# Patient Record
Sex: FEMALE | Race: WHITE | Employment: OTHER | URBAN - METROPOLITAN AREA
[De-identification: names, ages, dates, MRNs, and addresses within clinical notes are randomized per-mention and may not be internally consistent; named-entity substitution may affect disease eponyms.]

---

## 2021-02-13 ENCOUNTER — APPOINTMENT (OUTPATIENT)
Dept: MRI IMAGING | Age: 86
DRG: 312 | End: 2021-02-13
Attending: INTERNAL MEDICINE
Payer: MEDICARE

## 2021-02-13 ENCOUNTER — APPOINTMENT (OUTPATIENT)
Dept: CT IMAGING | Age: 86
DRG: 312 | End: 2021-02-13
Attending: EMERGENCY MEDICINE
Payer: MEDICARE

## 2021-02-13 ENCOUNTER — HOSPITAL ENCOUNTER (INPATIENT)
Age: 86
LOS: 2 days | Discharge: HOME HEALTH | DRG: 312 | End: 2021-02-15
Attending: EMERGENCY MEDICINE | Admitting: INTERNAL MEDICINE
Payer: MEDICARE

## 2021-02-13 ENCOUNTER — APPOINTMENT (OUTPATIENT)
Dept: GENERAL RADIOLOGY | Age: 86
DRG: 312 | End: 2021-02-13
Attending: EMERGENCY MEDICINE
Payer: MEDICARE

## 2021-02-13 DIAGNOSIS — I48.91 ATRIAL FIBRILLATION, UNSPECIFIED TYPE (HCC): ICD-10-CM

## 2021-02-13 DIAGNOSIS — R47.01 EXPRESSIVE APHASIA: ICD-10-CM

## 2021-02-13 DIAGNOSIS — R55 SYNCOPE AND COLLAPSE: Primary | ICD-10-CM

## 2021-02-13 DIAGNOSIS — Z86.73 HISTORY OF EMBOLIC STROKE: ICD-10-CM

## 2021-02-13 LAB
ALBUMIN SERPL-MCNC: 3.5 G/DL (ref 3.5–5)
ALBUMIN/GLOB SERPL: 1 {RATIO} (ref 1.1–2.2)
ALP SERPL-CCNC: 93 U/L (ref 45–117)
ALT SERPL-CCNC: 17 U/L (ref 12–78)
ANION GAP SERPL CALC-SCNC: 6 MMOL/L (ref 5–15)
APPEARANCE UR: CLEAR
APTT PPP: 25.4 SEC (ref 22.1–31)
AST SERPL-CCNC: 18 U/L (ref 15–37)
ATRIAL RATE: 64 BPM
BASOPHILS # BLD: 0 K/UL (ref 0–0.1)
BASOPHILS NFR BLD: 0 % (ref 0–1)
BILIRUB SERPL-MCNC: 0.8 MG/DL (ref 0.2–1)
BILIRUB UR QL: NEGATIVE
BUN SERPL-MCNC: 23 MG/DL (ref 6–20)
BUN/CREAT SERPL: 14 (ref 12–20)
CALCIUM SERPL-MCNC: 9.5 MG/DL (ref 8.5–10.1)
CALCULATED R AXIS, ECG10: -59 DEGREES
CALCULATED T AXIS, ECG11: 105 DEGREES
CHLORIDE SERPL-SCNC: 107 MMOL/L (ref 97–108)
CHOLEST SERPL-MCNC: 178 MG/DL
CO2 SERPL-SCNC: 27 MMOL/L (ref 21–32)
COLOR UR: NORMAL
COMMENT, HOLDF: NORMAL
CREAT SERPL-MCNC: 1.69 MG/DL (ref 0.55–1.02)
DIAGNOSIS, 93000: NORMAL
DIFFERENTIAL METHOD BLD: ABNORMAL
EOSINOPHIL # BLD: 0 K/UL (ref 0–0.4)
EOSINOPHIL NFR BLD: 1 % (ref 0–7)
ERYTHROCYTE [DISTWIDTH] IN BLOOD BY AUTOMATED COUNT: 15.4 % (ref 11.5–14.5)
EST. AVERAGE GLUCOSE BLD GHB EST-MCNC: 108 MG/DL
GLOBULIN SER CALC-MCNC: 3.5 G/DL (ref 2–4)
GLUCOSE SERPL-MCNC: 180 MG/DL (ref 65–100)
GLUCOSE UR STRIP.AUTO-MCNC: NEGATIVE MG/DL
HBA1C MFR BLD: 5.4 % (ref 4–5.6)
HCT VFR BLD AUTO: 41 % (ref 35–47)
HDLC SERPL-MCNC: 48 MG/DL
HDLC SERPL: 3.7 {RATIO} (ref 0–5)
HGB BLD-MCNC: 13.4 G/DL (ref 11.5–16)
HGB UR QL STRIP: NEGATIVE
IMM GRANULOCYTES # BLD AUTO: 0 K/UL (ref 0–0.04)
IMM GRANULOCYTES NFR BLD AUTO: 0 % (ref 0–0.5)
INR PPP: 1.1 (ref 0.9–1.1)
KETONES UR QL STRIP.AUTO: NEGATIVE MG/DL
LACTATE SERPL-SCNC: 1.9 MMOL/L (ref 0.4–2)
LACTATE SERPL-SCNC: 5.1 MMOL/L (ref 0.4–2)
LDLC SERPL CALC-MCNC: 91 MG/DL (ref 0–100)
LEUKOCYTE ESTERASE UR QL STRIP.AUTO: NEGATIVE
LIPID PROFILE,FLP: ABNORMAL
LYMPHOCYTES # BLD: 1.4 K/UL (ref 0.8–3.5)
LYMPHOCYTES NFR BLD: 22 % (ref 12–49)
MCH RBC QN AUTO: 31.2 PG (ref 26–34)
MCHC RBC AUTO-ENTMCNC: 32.7 G/DL (ref 30–36.5)
MCV RBC AUTO: 95.3 FL (ref 80–99)
MONOCYTES # BLD: 0.4 K/UL (ref 0–1)
MONOCYTES NFR BLD: 6 % (ref 5–13)
NEUTS SEG # BLD: 4.5 K/UL (ref 1.8–8)
NEUTS SEG NFR BLD: 71 % (ref 32–75)
NITRITE UR QL STRIP.AUTO: NEGATIVE
NRBC # BLD: 0 K/UL (ref 0–0.01)
NRBC BLD-RTO: 0 PER 100 WBC
PH UR STRIP: 6.5 [PH] (ref 5–8)
PLATELET # BLD AUTO: 192 K/UL (ref 150–400)
PMV BLD AUTO: 10.5 FL (ref 8.9–12.9)
POTASSIUM SERPL-SCNC: 3.7 MMOL/L (ref 3.5–5.1)
PROT SERPL-MCNC: 7 G/DL (ref 6.4–8.2)
PROT UR STRIP-MCNC: NEGATIVE MG/DL
PROTHROMBIN TIME: 11.3 SEC (ref 9–11.1)
Q-T INTERVAL, ECG07: 364 MS
QRS DURATION, ECG06: 86 MS
QTC CALCULATION (BEZET), ECG08: 438 MS
RBC # BLD AUTO: 4.3 M/UL (ref 3.8–5.2)
SAMPLES BEING HELD,HOLD: NORMAL
SODIUM SERPL-SCNC: 140 MMOL/L (ref 136–145)
SP GR UR REFRACTOMETRY: 1.01 (ref 1–1.03)
THERAPEUTIC RANGE,PTTT: NORMAL SECS (ref 58–77)
TRIGL SERPL-MCNC: 195 MG/DL (ref ?–150)
TROPONIN I SERPL-MCNC: <0.05 NG/ML
TSH SERPL DL<=0.05 MIU/L-ACNC: 4.51 UIU/ML (ref 0.36–3.74)
UR CULT HOLD, URHOLD: NORMAL
UROBILINOGEN UR QL STRIP.AUTO: 0.2 EU/DL (ref 0.2–1)
VENTRICULAR RATE, ECG03: 87 BPM
VLDLC SERPL CALC-MCNC: 39 MG/DL
WBC # BLD AUTO: 6.4 K/UL (ref 3.6–11)

## 2021-02-13 PROCEDURE — 84443 ASSAY THYROID STIM HORMONE: CPT

## 2021-02-13 PROCEDURE — 80053 COMPREHEN METABOLIC PANEL: CPT

## 2021-02-13 PROCEDURE — 80061 LIPID PANEL: CPT

## 2021-02-13 PROCEDURE — 77030038269 HC DRN EXT URIN PURWCK BARD -A

## 2021-02-13 PROCEDURE — 77030019905 HC CATH URETH INTMIT MDII -A

## 2021-02-13 PROCEDURE — 83605 ASSAY OF LACTIC ACID: CPT

## 2021-02-13 PROCEDURE — 84484 ASSAY OF TROPONIN QUANT: CPT

## 2021-02-13 PROCEDURE — 93005 ELECTROCARDIOGRAM TRACING: CPT

## 2021-02-13 PROCEDURE — 74011250636 HC RX REV CODE- 250/636: Performed by: INTERNAL MEDICINE

## 2021-02-13 PROCEDURE — 85730 THROMBOPLASTIN TIME PARTIAL: CPT

## 2021-02-13 PROCEDURE — 70450 CT HEAD/BRAIN W/O DYE: CPT

## 2021-02-13 PROCEDURE — 83036 HEMOGLOBIN GLYCOSYLATED A1C: CPT

## 2021-02-13 PROCEDURE — 99285 EMERGENCY DEPT VISIT HI MDM: CPT

## 2021-02-13 PROCEDURE — 81003 URINALYSIS AUTO W/O SCOPE: CPT

## 2021-02-13 PROCEDURE — 85025 COMPLETE CBC W/AUTO DIFF WBC: CPT

## 2021-02-13 PROCEDURE — 74011250637 HC RX REV CODE- 250/637: Performed by: INTERNAL MEDICINE

## 2021-02-13 PROCEDURE — 87040 BLOOD CULTURE FOR BACTERIA: CPT

## 2021-02-13 PROCEDURE — 71045 X-RAY EXAM CHEST 1 VIEW: CPT

## 2021-02-13 PROCEDURE — 65660000000 HC RM CCU STEPDOWN

## 2021-02-13 PROCEDURE — 85610 PROTHROMBIN TIME: CPT

## 2021-02-13 PROCEDURE — 36415 COLL VENOUS BLD VENIPUNCTURE: CPT

## 2021-02-13 PROCEDURE — 74011250636 HC RX REV CODE- 250/636: Performed by: EMERGENCY MEDICINE

## 2021-02-13 RX ORDER — MIRTAZAPINE 15 MG/1
7.5 TABLET, FILM COATED ORAL
Status: DISCONTINUED | OUTPATIENT
Start: 2021-02-13 | End: 2021-02-15 | Stop reason: HOSPADM

## 2021-02-13 RX ORDER — SODIUM CHLORIDE 9 MG/ML
75 INJECTION, SOLUTION INTRAVENOUS CONTINUOUS
Status: DISCONTINUED | OUTPATIENT
Start: 2021-02-13 | End: 2021-02-15 | Stop reason: HOSPADM

## 2021-02-13 RX ORDER — METOPROLOL SUCCINATE 50 MG/1
50 TABLET, EXTENDED RELEASE ORAL DAILY
Status: DISCONTINUED | OUTPATIENT
Start: 2021-02-14 | End: 2021-02-15 | Stop reason: HOSPADM

## 2021-02-13 RX ORDER — ASPIRIN 81 MG/1
81 TABLET ORAL DAILY
Status: DISCONTINUED | OUTPATIENT
Start: 2021-02-14 | End: 2021-02-15 | Stop reason: HOSPADM

## 2021-02-13 RX ORDER — GABAPENTIN 100 MG/1
100 CAPSULE ORAL 2 TIMES DAILY
Status: DISCONTINUED | OUTPATIENT
Start: 2021-02-13 | End: 2021-02-15 | Stop reason: HOSPADM

## 2021-02-13 RX ORDER — ATORVASTATIN CALCIUM 20 MG/1
20 TABLET, FILM COATED ORAL
Status: DISCONTINUED | OUTPATIENT
Start: 2021-02-13 | End: 2021-02-15 | Stop reason: HOSPADM

## 2021-02-13 RX ORDER — ENOXAPARIN SODIUM 100 MG/ML
30 INJECTION SUBCUTANEOUS EVERY 24 HOURS
Status: DISCONTINUED | OUTPATIENT
Start: 2021-02-13 | End: 2021-02-13

## 2021-02-13 RX ORDER — ACETAMINOPHEN 325 MG/1
650 TABLET ORAL
Status: DISCONTINUED | OUTPATIENT
Start: 2021-02-13 | End: 2021-02-15 | Stop reason: HOSPADM

## 2021-02-13 RX ORDER — ASPIRIN 300 MG/1
300 SUPPOSITORY RECTAL DAILY
Status: DISCONTINUED | OUTPATIENT
Start: 2021-02-14 | End: 2021-02-13

## 2021-02-13 RX ORDER — ACETAMINOPHEN 650 MG/1
650 SUPPOSITORY RECTAL
Status: DISCONTINUED | OUTPATIENT
Start: 2021-02-13 | End: 2021-02-15 | Stop reason: HOSPADM

## 2021-02-13 RX ORDER — LORAZEPAM 2 MG/ML
0.5 INJECTION INTRAMUSCULAR
Status: COMPLETED | OUTPATIENT
Start: 2021-02-13 | End: 2021-02-14

## 2021-02-13 RX ORDER — FUROSEMIDE 20 MG/1
20 TABLET ORAL DAILY
Status: DISCONTINUED | OUTPATIENT
Start: 2021-02-14 | End: 2021-02-15 | Stop reason: HOSPADM

## 2021-02-13 RX ORDER — LANOLIN ALCOHOL/MO/W.PET/CERES
3 CREAM (GRAM) TOPICAL
Status: DISCONTINUED | OUTPATIENT
Start: 2021-02-13 | End: 2021-02-15 | Stop reason: HOSPADM

## 2021-02-13 RX ORDER — LORAZEPAM 2 MG/ML
0.5 INJECTION INTRAMUSCULAR ONCE
Status: DISCONTINUED | OUTPATIENT
Start: 2021-02-13 | End: 2021-02-13

## 2021-02-13 RX ADMIN — ENOXAPARIN SODIUM 30 MG: 30 INJECTION SUBCUTANEOUS at 15:01

## 2021-02-13 RX ADMIN — MIRTAZAPINE 7.5 MG: 15 TABLET, FILM COATED ORAL at 23:02

## 2021-02-13 RX ADMIN — GABAPENTIN 100 MG: 100 CAPSULE ORAL at 18:58

## 2021-02-13 RX ADMIN — SODIUM CHLORIDE 1000 ML: 9 INJECTION, SOLUTION INTRAVENOUS at 16:17

## 2021-02-13 RX ADMIN — SODIUM CHLORIDE 500 ML: 9 INJECTION, SOLUTION INTRAVENOUS at 15:01

## 2021-02-13 RX ADMIN — SODIUM CHLORIDE 75 ML/HR: 9 INJECTION, SOLUTION INTRAVENOUS at 16:34

## 2021-02-13 RX ADMIN — APIXABAN 2.5 MG: 2.5 TABLET, FILM COATED ORAL at 18:58

## 2021-02-13 RX ADMIN — ATORVASTATIN CALCIUM 20 MG: 20 TABLET, FILM COATED ORAL at 23:02

## 2021-02-13 NOTE — ED NOTES
Bedside and Verbal shift change report given to Rosario Lazcano RN (oncoming nurse) by Alayna Berumne RN and Tawny Carter RN (offgoing nurse). Report included the following information SBAR, ED Summary, MAR and Recent Results.

## 2021-02-13 NOTE — ED NOTES
Patient presents from home with complaints of syncopal episode that occurred while sitting at this kitchen table. Per daughter patient is at her baseline mentation status.  Patient alert and oriented to self only, with delayed responses

## 2021-02-13 NOTE — CONSULTS
Cardiology Note dictated # [id number]  Imp:  Syncope without fall:     Likely causes include VT/VF, new CVA,  orthostatic hypotension  Chronic AF: CHADS VASC 7  Has H/O embolic CVA with residual expressive aphasia  HTN   Dementia  CKD  CAD: angioplasty in 1989 and PCI with stenting 2014  Ischemic CM most recent EF 25% (2017)  Recommendations:  Orthostatic blood pressure checks  Carotid dopplers  Agree with IV hydration  Further recommendations to follow  Thank you for this referral.  Viry Gomez MD  Interventional Cardiology  [facility]

## 2021-02-13 NOTE — ED PROVIDER NOTES
This is a 80-year-old female who lives at home with her family. She just arrived in [place] from [place] in October of last year. She has seen Dr. Gerhardt Lamy with cardiology once recently for her chronic atrial fibrillation. She is on Eliquis for that. According to the daughter, she had one episode of syncope about a year ago related to her atrial fibrillation while she was sitting on the toilet. The daughter states that the patient got up normally this morning, dressed herself, put her ear aids in place and got herself dressed. She has had no fever or chills, nausea or vomiting and no GI or  symptoms. The patient is complained of no chest pain, nausea or vomiting and no abdominal pain. She has had no headache. The daughter stated that she was her normal self, again, noting that she has a difficult time expressing herself and often cannot get her words out. She sat down to eat at the breakfast table and apparently blacked out. She was out for approximately 5 minutes. When she came around, the daughter stated that she was acting more like herself. The family had no other complaints. They understand she may be admitted and are okay with that. History reviewed. No pertinent past medical history. History reviewed. No pertinent surgical history. History reviewed. No pertinent family history.     Social History     Socioeconomic History    Marital status: Not on file     Spouse name: Not on file    Number of children: Not on file    Years of education: Not on file    Highest education level: Not on file   Occupational History    Not on file   Social Needs    Financial resource strain: Not on file    Food insecurity     Worry: Not on file     Inability: Not on file    Transportation needs     Medical: Not on file     Non-medical: Not on file   Tobacco Use    Smoking status: Not on file   Substance and Sexual Activity    Alcohol use: Not on file    Drug use: Not on file    Sexual activity: Not on file   Lifestyle    Physical activity     Days per week: Not on file     Minutes per session: Not on file    Stress: Not on file   Relationships    Social connections     Talks on phone: Not on file     Gets together: Not on file     Attends Denominational service: Not on file     Active member of club or organization: Not on file     Attends meetings of clubs or organizations: Not on file     Relationship status: Not on file    Intimate partner violence     Fear of current or ex partner: Not on file     Emotionally abused: Not on file     Physically abused: Not on file     Forced sexual activity: Not on file   Other Topics Concern    Not on file   Social History Narrative    Not on file         ALLERGIES: Patient has no known allergies. Review of Systems   Reason unable to perform ROS: The patient is unable to provide any history. The review of systems is provided by the daughter who lives with the patient. Constitutional: Negative for activity change, appetite change and fatigue. HENT: Negative for ear pain, facial swelling, sore throat and trouble swallowing. Eyes: Negative for pain, discharge and visual disturbance. Respiratory: Negative for chest tightness, shortness of breath and wheezing. Cardiovascular: Negative for chest pain and palpitations. Atrial fibrillation on Eliquis   Gastrointestinal: Negative for abdominal pain, blood in stool, nausea and vomiting. Genitourinary: Negative for difficulty urinating, flank pain and hematuria. Musculoskeletal: Negative for arthralgias, joint swelling, myalgias and neck pain. Skin: Negative for color change and rash. Neurological: Positive for syncope and speech difficulty ( Chronic). Negative for dizziness, weakness, numbness and headaches. Hematological: Negative for adenopathy. Does not bruise/bleed easily. Psychiatric/Behavioral: Negative for behavioral problems, confusion and sleep disturbance.    All other systems reviewed and are negative. Vitals:    02/13/21 1304   BP: (!) 102/91   Pulse: 84   Resp: 14   Temp: 97.3 °F (36.3 °C)   SpO2: 98%            Physical Exam  Vitals signs and nursing note reviewed. Constitutional:       General: She is not in acute distress. Appearance: She is well-developed. She is not ill-appearing, toxic-appearing or diaphoretic. Comments: The patient is alert and will respond to commands appropriately, however, she has a difficult time in speaking and getting her thoughts out. According to the family this is not unusual for her. All signs are as noted. HENT:      Head: Normocephalic and atraumatic. Nose: Nose normal.   Eyes:      General: No scleral icterus. Conjunctiva/sclera: Conjunctivae normal.      Pupils: Pupils are equal, round, and reactive to light. Neck:      Musculoskeletal: Normal range of motion and neck supple. Thyroid: No thyromegaly. Vascular: No JVD. Trachea: No tracheal deviation. Comments: No carotid bruits noted. Cardiovascular:      Rate and Rhythm: Normal rate and regular rhythm. Heart sounds: Normal heart sounds. No murmur. No friction rub. No gallop. Pulmonary:      Effort: Pulmonary effort is normal. No respiratory distress. Breath sounds: Normal breath sounds. No wheezing or rales. Chest:      Chest wall: No tenderness. Abdominal:      General: Bowel sounds are normal. There is no distension. Palpations: Abdomen is soft. There is no mass. Tenderness: There is no abdominal tenderness. There is no guarding or rebound. Musculoskeletal: Normal range of motion. General: No tenderness. Lymphadenopathy:      Cervical: No cervical adenopathy. Skin:     General: Skin is warm and dry. Findings: No erythema or rash. Neurological:      General: No focal deficit present. Mental Status: She is alert. Mental status is at baseline.       Cranial Nerves: No cranial nerve deficit. Sensory: No sensory deficit. Motor: No weakness. Coordination: Coordination normal.      Deep Tendon Reflexes: Reflexes are normal and symmetric. Comments: The patient has an expressive aphasia. She does follow commands appropriately and has no focal deficit otherwise noted. Psychiatric:      Comments: Unable to evaluate due to patient's inability to express herself. MDM  Number of Diagnoses or Management Options     Amount and/or Complexity of Data Reviewed  Clinical lab tests: ordered and reviewed  Tests in the radiology section of CPT®: ordered and reviewed  Decide to obtain previous medical records or to obtain history from someone other than the patient: yes  Obtain history from someone other than the patient: yes  Review and summarize past medical records: yes  Discuss the patient with other providers: yes  Independent visualization of images, tracings, or specimens: yes    Risk of Complications, Morbidity, and/or Mortality  Presenting problems: high  Diagnostic procedures: high  Management options: high    Patient Progress  Patient progress: stable         Procedures    ED MD EKG interpretation: The patient's rhythm is atrial fibrillation with a controlled rate at 87 beats a minute. There is a left axis shift with an occasional PVC noted. Nonspecific ST and T wave changes are noted. No other acute changes are appreciated. We will check the patient's labs, x-ray and urinalysis. Will CT her head. Patient will require admission overnight at least for syncope. Will consult cardiology as well. The patient's labs appear unremarkable. Troponin is still pending. We'll consult VCS as Dr. Manda Cat is seen the patient for A. fib. It sounds as though this is a chronic atrial fibrillation. We have no other records on this patient so it is impossible to tell. We'll ask [facility] to weigh in.  The patient is currently on Eliquis and will require admission for syncope. The daughter did say that the patient just wasn't quite acting herself earlier in the day. There was no other significant history associated with this episode of passing out. We'll also consult the hospitalist for admission. Perfect Serve Consult for Admission  2:25 PM    ED Room Number: ZT53/92  Patient Name and age:  Cynthia Ontiveros 80 y.o.  female  Working Diagnosis:   1. Syncope and collapse    2. Atrial fibrillation, unspecified type ([address]    3. Expressive aphasia        COVID-19 Suspicion:  no  Sepsis present:  no  Reassessment needed: no  Code Status:  Full Code  Readmission: no  Isolation Requirements:  no  Recommended Level of Care:  telemetry  Department:[facility] Adult ED - 21   Other:  VCS has been consulted    2:47 PM I have discussed the patient with Dr. Narendra Soliz and related I believe this is atrial fibrillation or chronic problem for the patient. She is slightly orthostatic when she goes from a laying to sitting and drops about 30 points on systolic pressure. We will give a bolus of fluid. The hospitalist is to see. Patient's troponin is negative.

## 2021-02-13 NOTE — ED NOTES
Patient's daughter Niecy updated at this time. Niecy had questions about MRI and wanted to talk with Dr. Apolonia Serrano.  RN paging Dr. Apolonia Serrano at this time and holding off on completing MRI screening form

## 2021-02-13 NOTE — H&P
HISTORY AND PHYSICAL      PCP: Esau Shoemaker MD  History source: Medical records       CC: Syncope       HPI: A 80year old female patient with PMH of Afib on Eliquis came to ED for evaluation of syncope. Pt is alert but confused  - she is a poor historian. No family at bedside. Most of hx obtained from ED records \" She just arrived in [place] from [place] in October of last year. She has seen Dr. Pamela Corrales with cardiology once recently for her chronic atrial fibrillation. According to the daughter, she had one episode of syncope about a year ago related to her atrial fibrillation while she was sitting on the toilet. The daughter states that the patient got up normally this morning, dressed herself, put her ear aids in place and got herself dressed. She has had no fever or chills, nausea or vomiting and no GI or  symptoms. The patient is complained of no chest pain, nausea or vomiting and no abdominal pain. The daughter stated that she was her normal self, again, noting that she has a difficult time expressing herself and often cannot get her words out. She sat down to eat at the breakfast table and apparently blacked out. She was out for approximately 5 minutes. When she came around, the daughter stated that she was acting more like herself. \"     Spoke with daughter Niecy - pt had similar episodes of syncope in the past. She attributes it to Afib. Hx labile BP. This episode happened while seated. He speech is slow which is her baseline but she has no issues with memeory  Explained to do MRI brain and she agreed. Cardiology and neurology consults. She confirmed that patient has living will and she is DNR and her sister Winston White is [place]. PMH/PSH:  Afib.  HLD, CVA, peripheral neuropathy     Home meds:   Prior to Admission medications    Not on File       Allergies:  No Known Allergies     FH:  History reviewed. No pertinent family history.  unable to obtain     SH:  Social History Tobacco Use    Smoking status: Not on file   Substance Use Topics    Alcohol use: Not on file   unable to obtain     ROS: Review of systems not obtained due to patient factors. PHYSICAL EXAM:  Visit Vitals  BP (!) 156/74 (BP 1 Location: Left arm, BP Patient Position: Supine)   Pulse 99   Temp 97.3 °F (36.3 °C)   Resp 20   SpO2 98%       Gen: NAD, elderly   HEENT: anicteric sclerae, normal conjunctiva, oropharynx clear, MM moist  Neck: supple, trachea midline, no adenopathy  Heart: RRR, no MRG, no JVD, no peripheral edema  Lungs: CTA b/l, non-labored respirations  Abd: soft, NT, ND, BS+, no organomegaly  Extr: warm  Skin: dry, no rash  Neuro: slow speech      Labs/Imaging:  Recent Results (from the past 24 hour(s))   EKG, 12 LEAD, INITIAL    Collection Time: 02/13/21  1:11 PM   Result Value Ref Range    Ventricular Rate 87 BPM    Atrial Rate 64 BPM    QRS Duration 86 ms    Q-T Interval 364 ms    QTC Calculation (Bezet) 438 ms    Calculated R Axis -59 degrees    Calculated T Axis 105 degrees    Diagnosis       Atrial fibrillation with premature ventricular or aberrantly conducted   complexes  Left anterior fascicular block  Anteroseptal infarct , age undetermined  No previous ECGs available  Confirmed by Cong Colvin MD, Tommy ([id number]) on 2/13/2021 2:16:57 PM     CBC WITH AUTOMATED DIFF    Collection Time: 02/13/21  1:22 PM   Result Value Ref Range    WBC 6.4 3.6 - 11.0 K/uL    RBC 4.30 3.80 - 5.20 M/uL    HGB 13.4 11.5 - 16.0 g/dL    HCT 41.0 35.0 - 47.0 %    MCV 95.3 80.0 - 99.0 FL    MCH 31.2 26.0 - 34.0 PG    MCHC 32.7 30.0 - 36.5 g/dL    RDW 15.4 (H) 11.5 - 14.5 %    PLATELET 581 581 - 692 K/uL    MPV 10.5 8.9 - 12.9 FL    NRBC 0.0 0  WBC    ABSOLUTE NRBC 0.00 0.00 - 0.01 K/uL    NEUTROPHILS 71 32 - 75 %    LYMPHOCYTES 22 12 - 49 %    MONOCYTES 6 5 - 13 %    EOSINOPHILS 1 0 - 7 %    BASOPHILS 0 0 - 1 %    IMMATURE GRANULOCYTES 0 0.0 - 0.5 %    ABS. NEUTROPHILS 4.5 1.8 - 8.0 K/UL    ABS.  LYMPHOCYTES 1.4 0.8 - 3.5 K/UL    ABS. MONOCYTES 0.4 0.0 - 1.0 K/UL    ABS. EOSINOPHILS 0.0 0.0 - 0.4 K/UL    ABS. BASOPHILS 0.0 0.0 - 0.1 K/UL    ABS. IMM. GRANS. 0.0 0.00 - 0.04 K/UL    DF AUTOMATED     METABOLIC PANEL, COMPREHENSIVE    Collection Time: 02/13/21  1:22 PM   Result Value Ref Range    Sodium 140 136 - 145 mmol/L    Potassium 3.7 3.5 - 5.1 mmol/L    Chloride 107 97 - 108 mmol/L    CO2 27 21 - 32 mmol/L    Anion gap 6 5 - 15 mmol/L    Glucose 180 (H) 65 - 100 mg/dL    BUN 23 (H) 6 - 20 MG/DL    Creatinine 1.69 (H) 0.55 - 1.02 MG/DL    BUN/Creatinine ratio 14 12 - 20      GFR est AA 34 (L) >60 ml/min/1.73m2    GFR est non-AA 28 (L) >60 ml/min/1.73m2    Calcium 9.5 8.5 - 10.1 MG/DL    Bilirubin, total 0.8 0.2 - 1.0 MG/DL    ALT (SGPT) 17 12 - 78 U/L    AST (SGOT) 18 15 - 37 U/L    Alk. phosphatase 93 45 - 117 U/L    Protein, total 7.0 6.4 - 8.2 g/dL    Albumin 3.5 3.5 - 5.0 g/dL    Globulin 3.5 2.0 - 4.0 g/dL    A-G Ratio 1.0 (L) 1.1 - 2.2     SAMPLES BEING HELD    Collection Time: 02/13/21  1:22 PM   Result Value Ref Range    SAMPLES BEING HELD 1BLU,1RED     COMMENT        Add-on orders for these samples will be processed based on acceptable specimen integrity and analyte stability, which may vary by analyte.    PTT    Collection Time: 02/13/21  1:22 PM   Result Value Ref Range    aPTT 25.4 22.1 - 31.0 sec    aPTT, therapeutic range     58.0 - 77.0 SECS   PROTHROMBIN TIME + INR    Collection Time: 02/13/21  1:22 PM   Result Value Ref Range    INR 1.1 0.9 - 1.1      Prothrombin time 11.3 (H) 9.0 - 11.1 sec   TROPONIN I    Collection Time: 02/13/21  1:22 PM   Result Value Ref Range    Troponin-I, Qt. <0.05 <0.05 ng/mL   LIPID PANEL    Collection Time: 02/13/21  1:22 PM   Result Value Ref Range    LIPID PROFILE          Cholesterol, total 178 <200 MG/DL    Triglyceride 195 (H) <150 MG/DL    HDL Cholesterol 48 MG/DL    LDL, calculated 91 0 - 100 MG/DL    VLDL, calculated 39 MG/DL    CHOL/HDL Ratio 3.7 0.0 - 5.0 HEMOGLOBIN A1C WITH EAG    Collection Time: 02/13/21  1:22 PM   Result Value Ref Range    Hemoglobin A1c 5.4 4.0 - 5.6 %    Est. average glucose 108 mg/dL   TSH 3RD GENERATION    Collection Time: 02/13/21  1:22 PM   Result Value Ref Range    TSH 4.51 (H) 0.36 - 3.74 uIU/mL   URINALYSIS W/ RFLX MICROSCOPIC    Collection Time: 02/13/21  2:04 PM   Result Value Ref Range    Color YELLOW/STRAW      Appearance CLEAR CLEAR      Specific gravity 1.009 1.003 - 1.030      pH (UA) 6.5 5.0 - 8.0      Protein Negative NEG mg/dL    Glucose Negative NEG mg/dL    Ketone Negative NEG mg/dL    Bilirubin Negative NEG      Blood Negative NEG      Urobilinogen 0.2 0.2 - 1.0 EU/dL    Nitrites Negative NEG      Leukocyte Esterase Negative NEG     URINE CULTURE HOLD SAMPLE    Collection Time: 02/13/21  2:04 PM    Specimen: Serum   Result Value Ref Range    Urine culture hold        Urine on hold in Microbiology dept for 2 days. If unpreserved urine is submitted, it cannot be used for addtional testing after 24 hours, recollection will be required. LACTIC ACID    Collection Time: 02/13/21  2:11 PM   Result Value Ref Range    Lactic acid 5.1 (HH) 0.4 - 2.0 MMOL/L       Recent Labs     02/13/21  1322   WBC 6.4   HGB 13.4   HCT 41.0        Recent Labs     02/13/21  1322      K 3.7      CO2 27   BUN 23*   CREA 1.69*   *   CA 9.5     Recent Labs     02/13/21  1322   ALT 17   AP 93   TBILI 0.8   TP 7.0   ALB 3.5   GLOB 3.5       Recent Labs     02/13/21  1322   TROIQ <0.05       Recent Labs     02/13/21  1322   INR 1.1   PTP 11.3*   APTT 25.4        No results for input(s): PH, PCO2, PO2 in the last 72 hours. All labs and imaging personally reviewed by me. Assessment & Plan:   Syncope - unclear etiology   R/o CVA. Could be cardiac etiology   - admit to neuro floor. Tele monitoring   - CT head: Significant small vessel ischemic changes. Old right occipital infarct. No acute abnormality.   - neuro checks  - lipid panel LDL 91 and A1c 5.4  - MRI brain ordered  - neurology consult placed  - SLP/PT/OT  - orthostatics ordered     Elevated cr. Unknown baseline  - cr 1.69  - started on IVF  - will monitor renal function    Lactic acidosis 5.1  - unclear etiology  - no signs of infection. Could be due to dehydration  - UA negative. CXR clear  - blood cx sent in ED  - IVF NS bolus and c/w IVF  - will monitor    Chronic Afib   - rate controlled on metoprolol. C/w Eliquis   - echo ordered   - cardiology consulted in ED    Peripheral neuropathy - gabapentin  HLD- statin  Hx CVA    Independent mobility but slow   DVT ppx: Eliquis  Code status: DNR  Disposition: TBD.  Home likely tomorrow    Signed By: Caterina Broussard MD     February 13, 2021

## 2021-02-13 NOTE — ROUTINE PROCESS
TRANSFER - OUT REPORT: 
 
Verbal report given to Ness Winslow RN on Deleta Dandy  being transferred to  for routine progression of care Report consisted of patients Situation, Background, Assessment and  
Recommendations(SBAR). Information from the following report(s) SBAR, ED Summary, MAR, Recent Results and Cardiac Rhythm Afib was reviewed with the receiving nurse. Lines:  
Peripheral IV 02/13/21 Right Antecubital (Active) Site Assessment Clean, dry, & intact 02/13/21 1326 Phlebitis Assessment 0 02/13/21 1326 Infiltration Assessment 0 02/13/21 1326 Dressing Status Clean, dry, & intact 02/13/21 1326 Dressing Type Transparent 02/13/21 1326 Hub Color/Line Status Pink 02/13/21 1326 Action Taken Blood drawn 02/13/21 1326 Peripheral IV 02/13/21 Left Forearm (Active) Site Assessment Clean, dry, & intact 02/13/21 1416 Phlebitis Assessment 0 02/13/21 1416 Infiltration Assessment 0 02/13/21 1416 Opportunity for questions and clarification was provided. Patient transported with: 
 Transport

## 2021-02-14 ENCOUNTER — APPOINTMENT (OUTPATIENT)
Dept: MRI IMAGING | Age: 86
DRG: 312 | End: 2021-02-14
Attending: INTERNAL MEDICINE
Payer: MEDICARE

## 2021-02-14 ENCOUNTER — APPOINTMENT (OUTPATIENT)
Dept: VASCULAR SURGERY | Age: 86
DRG: 312 | End: 2021-02-14
Attending: INTERNAL MEDICINE
Payer: MEDICARE

## 2021-02-14 LAB
ANION GAP SERPL CALC-SCNC: 5 MMOL/L (ref 5–15)
BUN SERPL-MCNC: 19 MG/DL (ref 6–20)
BUN/CREAT SERPL: 13 (ref 12–20)
CALCIUM SERPL-MCNC: 8.1 MG/DL (ref 8.5–10.1)
CHLORIDE SERPL-SCNC: 112 MMOL/L (ref 97–108)
CO2 SERPL-SCNC: 19 MMOL/L (ref 21–32)
CREAT SERPL-MCNC: 1.41 MG/DL (ref 0.55–1.02)
ERYTHROCYTE [DISTWIDTH] IN BLOOD BY AUTOMATED COUNT: 15.3 % (ref 11.5–14.5)
GLUCOSE SERPL-MCNC: 88 MG/DL (ref 65–100)
HCT VFR BLD AUTO: 36.1 % (ref 35–47)
HGB BLD-MCNC: 11.6 G/DL (ref 11.5–16)
LEFT CCA DIST DIAS: 10.1 CM/S
LEFT CCA DIST SYS: 56.4 CM/S
LEFT CCA PROX DIAS: 8.4 CM/S
LEFT CCA PROX SYS: 45 CM/S
LEFT ECA DIAS: 0 CM/S
LEFT ECA SYS: 98.7 CM/S
LEFT ICA DIST DIAS: 15 CM/S
LEFT ICA DIST SYS: 55.6 CM/S
LEFT ICA MID DIAS: 14 CM/S
LEFT ICA MID SYS: 69.6 CM/S
LEFT ICA PROX DIAS: 22 CM/S
LEFT ICA PROX SYS: 81.5 CM/S
LEFT ICA/CCA SYS: 1.45
LEFT VERTEBRAL DIAS: 8.55 CM/S
LEFT VERTEBRAL SYS: 30.5 CM/S
MCH RBC QN AUTO: 30.8 PG (ref 26–34)
MCHC RBC AUTO-ENTMCNC: 32.1 G/DL (ref 30–36.5)
MCV RBC AUTO: 95.8 FL (ref 80–99)
NRBC # BLD: 0 K/UL (ref 0–0.01)
NRBC BLD-RTO: 0 PER 100 WBC
PLATELET # BLD AUTO: 187 K/UL (ref 150–400)
PMV BLD AUTO: 10.8 FL (ref 8.9–12.9)
POTASSIUM SERPL-SCNC: 3.6 MMOL/L (ref 3.5–5.1)
RBC # BLD AUTO: 3.77 M/UL (ref 3.8–5.2)
RIGHT CCA DIST DIAS: 14.2 CM/S
RIGHT CCA DIST SYS: 54.4 CM/S
RIGHT CCA PROX DIAS: 16.4 CM/S
RIGHT CCA PROX SYS: 73.2 CM/S
RIGHT ECA DIAS: 0 CM/S
RIGHT ECA SYS: 95.6 CM/S
RIGHT ICA DIST DIAS: 17.7 CM/S
RIGHT ICA DIST SYS: 49.1 CM/S
RIGHT ICA MID DIAS: 9 CM/S
RIGHT ICA MID SYS: 47.4 CM/S
RIGHT ICA PROX DIAS: 14.6 CM/S
RIGHT ICA PROX SYS: 67.4 CM/S
RIGHT ICA/CCA SYS: 1.2
RIGHT VERTEBRAL DIAS: 0 CM/S
RIGHT VERTEBRAL SYS: 28.9 CM/S
SODIUM SERPL-SCNC: 136 MMOL/L (ref 136–145)
WBC # BLD AUTO: 6.4 K/UL (ref 3.6–11)
WBC #/AREA STL HPF: NORMAL /HPF (ref 0–4)

## 2021-02-14 PROCEDURE — 77030038269 HC DRN EXT URIN PURWCK BARD -A

## 2021-02-14 PROCEDURE — 70551 MRI BRAIN STEM W/O DYE: CPT

## 2021-02-14 PROCEDURE — 80048 BASIC METABOLIC PNL TOTAL CA: CPT

## 2021-02-14 PROCEDURE — 65660000000 HC RM CCU STEPDOWN

## 2021-02-14 PROCEDURE — 36415 COLL VENOUS BLD VENIPUNCTURE: CPT

## 2021-02-14 PROCEDURE — 87449 NOS EACH ORGANISM AG IA: CPT

## 2021-02-14 PROCEDURE — 97161 PT EVAL LOW COMPLEX 20 MIN: CPT

## 2021-02-14 PROCEDURE — 97116 GAIT TRAINING THERAPY: CPT

## 2021-02-14 PROCEDURE — 93880 EXTRACRANIAL BILAT STUDY: CPT

## 2021-02-14 PROCEDURE — 99223 1ST HOSP IP/OBS HIGH 75: CPT | Performed by: PSYCHIATRY & NEUROLOGY

## 2021-02-14 PROCEDURE — 74011250636 HC RX REV CODE- 250/636: Performed by: INTERNAL MEDICINE

## 2021-02-14 PROCEDURE — 85027 COMPLETE CBC AUTOMATED: CPT

## 2021-02-14 PROCEDURE — 74011250637 HC RX REV CODE- 250/637: Performed by: INTERNAL MEDICINE

## 2021-02-14 PROCEDURE — 89055 LEUKOCYTE ASSESSMENT FECAL: CPT

## 2021-02-14 RX ADMIN — MIRTAZAPINE 7.5 MG: 15 TABLET, FILM COATED ORAL at 22:16

## 2021-02-14 RX ADMIN — LORAZEPAM 0.5 MG: 2 INJECTION INTRAMUSCULAR; INTRAVENOUS at 14:53

## 2021-02-14 RX ADMIN — GABAPENTIN 100 MG: 100 CAPSULE ORAL at 09:26

## 2021-02-14 RX ADMIN — ASPIRIN 81 MG: 81 TABLET, COATED ORAL at 09:26

## 2021-02-14 RX ADMIN — GABAPENTIN 100 MG: 100 CAPSULE ORAL at 18:11

## 2021-02-14 RX ADMIN — APIXABAN 2.5 MG: 2.5 TABLET, FILM COATED ORAL at 09:26

## 2021-02-14 RX ADMIN — ATORVASTATIN CALCIUM 20 MG: 20 TABLET, FILM COATED ORAL at 22:16

## 2021-02-14 RX ADMIN — APIXABAN 2.5 MG: 2.5 TABLET, FILM COATED ORAL at 18:11

## 2021-02-14 NOTE — PROGRESS NOTES
Cardiology Progress Note  2021     Admit Date: 2021  Admit Diagnosis: Aphasia [R47.01]  CC: none currently    Assessment/Plan:   No complaints. AF with reasonable rate control   Can't tell if orthostatics have been done  Continue present care  Scheduled for MRI      For other plans, see orders. Subjective: Jose Alejandro Rizvi reports   Chest Pain:  [x]  none;  consistent with []  non-cardiac  []  atypical  []  angina             [x]  none now    []  on-going  Dyspnea: [x]  none    []  at rest    []  with exertion   []  improved    []  unchanged    []  worsening  PND:       [x]  none      []  overnight      Orthopnea:   [x]  none        []  improved         []  unchanged        []  worsening  Presyncope: [x]  none        []  improved         []  unchanged        []  worsening  Ambulated in hallway without symptoms  []  Yes  Ambulated in room without symptoms  []  Yes    Objective:    Physical Exam:  Overall VSSAF;    Visit Vitals  /66 (BP 1 Location: Right upper arm, BP Patient Position: At rest)   Pulse 82   Temp 98.1 °F (36.7 °C)   Resp 11   Wt 56.8 kg (125 lb 4.8 oz)   SpO2 100%     Temp (24hrs), Av.3 °F (36.8 °C), Min:97.3 °F (36.3 °C), Max:99 °F (37.2 °C)    Patient Vitals for the past 8 hrs:   Pulse   21 1015 82   21 0608 96    Patient Vitals for the past 8 hrs:   Resp   21 1015 11   21 0608 17    Patient Vitals for the past 8 hrs:   BP   21 1015 108/66   21 0608 (!) 136/105          Intake/Output Summary (Last 24 hours) at 2021 1133  Last data filed at 2021 1620  Gross per 24 hour   Intake 500 ml   Output    Net 500 ml       General Appearance: Well developed, well nourished, no acute distress. Ears/Nose/Mouth/Throat:   Normal MM; anicteric. JVP: WNL   Resp:   Lungs clear to auscultation bilaterally. Nl resp effort. Cardiovascular:  IR, S1, S2 normal, no new murmur. No gallop or rub.    Abdomen:   Soft, non-tender, bowel sounds are present. Extremities: No edema bilaterally. Skin:  Neuro: Warm and dry. A/O x3, grossly nonfocal    []  cath site intact w/o hematoma or bruit; distal pulse unchanged. Data Review:     Telemetry independently reviewed : []  sinus      []  chronic afib     []  par afib    []  NSVT    ECG independently reviewed:  []  NSR         []  no significant changes  [] no new ECG provided for review  Lab results reviewed as noted below. Current medications reviewed as noted below. No results for input(s): PH, PCO2, PO2 in the last 72 hours. Recent Labs     02/13/21  1322   TROIQ <0.05     Recent Labs     02/14/21  0346 02/13/21  1322   NA  --  140   K  --  3.7   CL  --  107   CO2  --  27   BUN  --  23*   CREA  --  1.69*   GLU  --  180*   CA  --  9.5   ALB  --  3.5   WBC 6.4 6.4   HGB 11.6 13.4   HCT 36.1 41.0    192     Recent Labs     02/13/21  1322   ALT 17   AP 93   TBILI 0.8   TP 7.0   ALB 3.5   GLOB 3.5     Recent Labs     02/13/21  1322   INR 1.1   PTP 11.3*   APTT 25.4      No results for input(s): FE, TIBC, PSAT, FERR in the last 72 hours.    No results found for: GLUCPOC    Current Facility-Administered Medications   Medication Dose Route Frequency    acetaminophen (TYLENOL) tablet 650 mg  650 mg Oral Q4H PRN    Or    acetaminophen (TYLENOL) solution 650 mg  650 mg Per NG tube Q4H PRN    Or    acetaminophen (TYLENOL) suppository 650 mg  650 mg Rectal Q4H PRN    0.9% sodium chloride infusion  75 mL/hr IntraVENous CONTINUOUS    melatonin tablet 3 mg  3 mg Oral QHS PRN    aspirin delayed-release tablet 81 mg  81 mg Oral DAILY    apixaban (ELIQUIS) tablet 2.5 mg  2.5 mg Oral BID    [Held by provider] furosemide (LASIX) tablet 20 mg  20 mg Oral DAILY    gabapentin (NEURONTIN) capsule 100 mg  100 mg Oral BID    [Held by provider] metoprolol succinate (TOPROL-XL) XL tablet 50 mg  50 mg Oral DAILY    mirtazapine (REMERON) tablet 7.5 mg  7.5 mg Oral QHS    atorvastatin (LIPITOR) tablet 20 mg 20 mg Oral QHS    LORazepam (ATIVAN) injection 0.5 mg  0.5 mg IntraVENous ONCE PRN        Sahil Cornell MD

## 2021-02-14 NOTE — CONSULTS
Neuro consult completed and dictated. Syncope, likely due to orthostatic hypotension or arrhythmia.   MRI to fully exclude recurrent stroke is pending, Echo pending

## 2021-02-14 NOTE — PROGRESS NOTES
Bedside and Verbal shift change report given to Ethan Snow (oncoming nurse) by Fe Condon (offgoing nurse). Report included the following information SBAR, Kardex, ED Summary, Intake/Output, MAR, Recent Results, Cardiac Rhythm A fib, Quality Measures and Dual Neuro Assessment.

## 2021-02-14 NOTE — PROGRESS NOTES
Hospitalist Progress Note      Hospital summary:  80year old female patient with PMH of Afib on Eliquis came to ED for evaluation of syncope. Pt is alert but confused  - she is a poor historian. No family at bedside. Most of hx obtained from ED records \" She just arrived in [place] from [place] in October of last year. Christina Canada has seen Dr. Pamela Corrales with cardiology once recently for her chronic atrial fibrillation.  According to the daughter, she had one episode of syncope about a year ago related to her atrial fibrillation while she was sitting on the toilet.  The daughter states that the patient got up normally this morning, dressed herself, put her ear aids in place and got herself dressed. Christina Canada has had no fever or chills, nausea or vomiting and no GI or  symptoms.  The patient is complained of no chest pain, nausea or vomiting and no abdominal pain.   The daughter stated that she was her normal self, again, noting that she has a difficult time expressing herself and often cannot get her words out.  She sat down to eat at the breakfast table and apparently blacked out.  She was out for approximately 5 minutes.  When she came around, the daughter stated that she was acting more like herself. \"      Spoke with daughter Niecy - pt had similar episodes of syncope in the past. She attributes it to Afib. Hx labile BP. This episode happened while seated. He speech is slow which is her baseline but she has no issues with memeory  Explained to do MRI brain and she agreed. Cardiology and neurology consults. She confirmed that patient has living will and she is DNR and her sister Winston White is [place]. 2/13/2021      Assessment/Plan:  Syncope - unclear etiology   R/o CVA. Could be cardiac etiology   - CT head: Significant small vessel ischemic changes. Old right occipital infarct. No acute abnormality.   - neuro checks  - lipid panel LDL 91 and A1c 5.4  - carotid duplex: Evidence of mild, less than 50%, stenosis in the right and left internal carotid arteries  - MRI brain pending   - neurology consult pending   - c/w aspirin and statin      Elevated cr. Unknown baseline  - cr 1.69 on poa   - c/w IVF  - will monitor renal function     Lactic acidosis 5.1 - resolved   - unclear etiology  - no signs of infection. Could be due to dehydration  - UA negative. CXR clear  - blood cx sent in ED  - s/p IVF NS bolus in ED and c/w IVF  - will monitor     Chronic Afib   - rate controlled on metoprolol. C/w Eliquis   - echo pending   - appreciate cardiology input      Peripheral neuropathy - gabapentin  HLD- statin  Hx CVA - c/w aspirin, statin     Independent mobility but slow   DVT ppx: Eliquis  Code status: DNR  Disposition: TBD. Home with HH likely tomorrow  ----------------------------------------------    CC: Syncope     S: Patient is seen and examined at bedside this AM. She c/w diarrhea - 3 episodes - watery, loose - c diff ordered. Speech is much clearer than yesterday   Discussed with nursing       Review of Systems:  Review of systems not obtained due to patient factors.     O:  Visit Vitals  /81 (BP Patient Position: Sitting) Comment (BP Patient Position): post ambulation   Pulse 95   Temp 97.6 °F (36.4 °C)   Resp 13   Wt 56.8 kg (125 lb 4.8 oz)   SpO2 100%       PHYSICAL EXAM:  Gen: NAD, elderly   HEENT: anicteric sclerae, normal conjunctiva, oropharynx clear, MM moist  Neck: supple, trachea midline, no adenopathy  Heart: RRR, no MRG, no JVD, no peripheral edema  Lungs: CTA b/l, non-labored respirations  Abd: soft, NT, ND, BS+, no organomegaly  Extr: warm  Skin: dry, no rash  Neuro: normal speech, moves all extremities  Psych: normal mood, appropriate affect      Intake/Output Summary (Last 24 hours) at 2/14/2021 1542  Last data filed at 2/13/2021 1620  Gross per 24 hour   Intake 500 ml   Output    Net 500 ml        Recent labs & imaging reviewed:  Recent Results (from the past 24 hour(s))   LACTIC ACID    Collection Time: 02/13/21  6:17 PM   Result Value Ref Range    Lactic acid 1.9 0.4 - 2.0 MMOL/L   CBC W/O DIFF    Collection Time: 02/14/21  3:46 AM   Result Value Ref Range    WBC 6.4 3.6 - 11.0 K/uL    RBC 3.77 (L) 3.80 - 5.20 M/uL    HGB 11.6 11.5 - 16.0 g/dL    HCT 36.1 35.0 - 47.0 %    MCV 95.8 80.0 - 99.0 FL    MCH 30.8 26.0 - 34.0 PG    MCHC 32.1 30.0 - 36.5 g/dL    RDW 15.3 (H) 11.5 - 14.5 %    PLATELET 332 282 - 614 K/uL    MPV 10.8 8.9 - 12.9 FL    NRBC 0.0 0  WBC    ABSOLUTE NRBC 0.00 0.00 - 0.01 K/uL   WBC, STOOL    Collection Time: 02/14/21  9:44 AM   Result Value Ref Range    White blood cells, stool 0 TO 4 0 - 4 /HPF   DUPLEX CAROTID BILATERAL    Collection Time: 02/14/21 10:57 AM   Result Value Ref Range    Right cca dist sys 54.4 cm/s    Right CCA dist vila 14.2 cm/s    Right CCA prox sys 73.2 cm/s    Right CCA prox vila 16.4 cm/s    Right eca sys 95.6 cm/s    RIGHT EXTERNAL CAROTID ARTERY D 0.00 cm/s    Right ICA dist sys 49.1 cm/s    Right ICA dist vila 17.7 cm/s    Right ICA mid sys 47.4 cm/s    Right ICA mid vila 9.0 cm/s    Right ICA prox sys 67.4 cm/s    Right ICA prox vila 14.6 cm/s    Right vertebral sys 28.9 cm/s    RIGHT VERTEBRAL ARTERY D 0.00 cm/s    Right ICA/CCA sys 1.2     Left CCA dist sys 56.4 cm/s    Left CCA dist vila 10.1 cm/s    Left CCA prox sys 45.0 cm/s    Left CCA prox vila 8.4 cm/s    Left ECA sys 98.7 cm/s    LEFT EXTERNAL CAROTID ARTERY D 0.00 cm/s    Left ICA dist sys 55.6 cm/s    Left ICA dist vila 15.0 cm/s    Left ICA mid sys 69.6 cm/s    Left ICA mid vila 14.0 cm/s    Left ICA prox sys 81.5 cm/s    Left ICA prox vila 22.0 cm/s    Left vertebral sys 30.5 cm/s    LEFT VERTEBRAL ARTERY D 8.55 cm/s    Left ICA/CCA sys 1.45      Recent Labs     02/14/21  0346 02/13/21  1322   WBC 6.4 6.4   HGB 11.6 13.4   HCT 36.1 41.0    192     Recent Labs     02/13/21  1322      K 3.7      CO2 27   BUN 23*   CREA 1.69*   *   CA 9.5     Recent Labs     02/13/21  1322   ALT 17   AP 93   TBILI 0.8   TP 7.0   ALB 3.5   GLOB 3.5     Recent Labs     02/13/21  1322   INR 1.1   PTP 11.3*   APTT 25.4      No results for input(s): FE, TIBC, PSAT, FERR in the last 72 hours. No results found for: FOL, RBCF   No results for input(s): PH, PCO2, PO2 in the last 72 hours.   Recent Labs     02/13/21  1322   TROIQ <0.05     Lab Results   Component Value Date/Time    Cholesterol, total 178 02/13/2021 01:22 PM    HDL Cholesterol 48 02/13/2021 01:22 PM    LDL, calculated 91 02/13/2021 01:22 PM    Triglyceride 195 (H) 02/13/2021 01:22 PM    CHOL/HDL Ratio 3.7 02/13/2021 01:22 PM     No results found for: [facility]  Lab Results   Component Value Date/Time    Color YELLOW/STRAW 02/13/2021 02:04 PM    Appearance CLEAR 02/13/2021 02:04 PM    Specific gravity 1.009 02/13/2021 02:04 PM    pH (UA) 6.5 02/13/2021 02:04 PM    Protein Negative 02/13/2021 02:04 PM    Glucose Negative 02/13/2021 02:04 PM    Ketone Negative 02/13/2021 02:04 PM    Bilirubin Negative 02/13/2021 02:04 PM    Urobilinogen 0.2 02/13/2021 02:04 PM    Nitrites Negative 02/13/2021 02:04 PM    Leukocyte Esterase Negative 02/13/2021 02:04 PM       Med list reviewed  Current Facility-Administered Medications   Medication Dose Route Frequency    acetaminophen (TYLENOL) tablet 650 mg  650 mg Oral Q4H PRN    Or    acetaminophen (TYLENOL) solution 650 mg  650 mg Per NG tube Q4H PRN    Or    acetaminophen (TYLENOL) suppository 650 mg  650 mg Rectal Q4H PRN    0.9% sodium chloride infusion  75 mL/hr IntraVENous CONTINUOUS    melatonin tablet 3 mg  3 mg Oral QHS PRN    aspirin delayed-release tablet 81 mg  81 mg Oral DAILY    apixaban (ELIQUIS) tablet 2.5 mg  2.5 mg Oral BID    [Held by provider] furosemide (LASIX) tablet 20 mg  20 mg Oral DAILY    gabapentin (NEURONTIN) capsule 100 mg  100 mg Oral BID    [Held by provider] metoprolol succinate (TOPROL-XL) XL tablet 50 mg  50 mg Oral DAILY    mirtazapine (REMERON) tablet 7.5 mg  7.5 mg Oral QHS    atorvastatin (LIPITOR) tablet 20 mg  20 mg Oral QHS       Care Plan discussed with:  Patient/Family and Nurse    Pascual Perez MD  Internal Medicine  Date of Service: 2/14/2021

## 2021-02-14 NOTE — CONSULTS
[address]    Name:  Noreen Yang  MR#:  [id number]  :  [id number]  ACCOUNT #:  [de-identified]  DATE OF SERVICE:  2021    REFERRING PHYSICIAN:  Brendan Claude, MD.    HISTORY OF PRESENT ILLNESS:  This is a patient admitted to the emergency room after syncopal spell. She passed out at the breakfast table this morning, and according to the notes, the daughter indicated that she was out for about 5 minutes. The type of syncopal spell was not characterized any further, and it is not known if she had seizure activity. It does not appear that she fell or had sustained any trauma. She had another syncopal spell, apparently about a year ago while sitting on the commode. The patient has memory loss, dementia, and also has an expressive aphasia. She was seen for her initial office visit in our practice by Dr. Nisha Moreno on 2021. His notes summarize her cardiac-related issues including cardiomyopathy with an ejection fraction of 25% in , at least two myocardial infarctions and angioplasty in  [facility], coronary stenting in . She has permanent atrial fibrillation; embolic strokes in 3976; has an expressive aphasia; longstanding hypertension; hyperlipidemia; peripheral vascular disease, particularly the left leg; and cholecystectomy. REVIEW OF SYSTEMS:  The patient is awake, alert, and comfortable at this time. Denies any chest pain, shortness of breath, orthopnea, or PND. She denies any headache or blurred vision. She is hard of hearing. PHYSICAL EXAMINATION:  GENERAL:  This is a well-developed, thin, frail-appearing elderly lady. VITAL SIGNS:  As follows; heart rate is 112, respirations 16, sats 100% on room air. HEENT:  Unremarkable. NECK:  Supple. Carotid pulse is palpable. No bruits. CHEST WALL:  Nontender. LUNGS:  Clear. HEART:  Irregular rhythm, moderate rate. No S3 gallop or friction rub.   No thrills, lifts, heaves, or significant murmurs. ABDOMEN:  Flat, soft, nontender. No bruits. NEUROLOGIC:  The patient has no focal motor signs, is awake and alert. IMAGING:  Her EKG demonstrates atrial fibrillation, left anterior marlena-block, anteroseptal infarction, age undetermined. Chest x-ray demonstrates normal size heart, clear lung fields. CT scan of the head without contrast, significant small vessel ischemic change, old right occipital infarct, no acute abnormalities. LABORATORY DATA:  Hemogram is normal.  BUN 22 and creatinine 1.69. A1c is 5.4. Troponin less than 0.05.  TSH is slightly elevated at 4.51. IMPRESSION:  Syncope/loss of consciousness: The patient apparently did not fall, did not sustain any trauma. Other than that, it was not characterized. She had a syncopal spell on the commode last year, this can be suggestive of a vagal response; however, with her ischemic cardiomyopathy, ventricular tachycardia and ventricular fibrillation needs to be considered, orthostatic hypotension and another embolic event to her brain. Recommend orthostatic blood pressure checks. Agree with IV hydration. MRI of the brain is pending for tomorrow. Further recommendations to follow.     Thank you for this referral.      Oralia Almodovar MD [id number]  D:  02/13/2021 18:28  T:  02/13/2021 22:50  JOB #:  [id number]

## 2021-02-14 NOTE — PROGRESS NOTES
Problem: Mobility Impaired (Adult and Pediatric)  Goal: *Acute Goals and Plan of Care (Insert Text)  Description: FUNCTIONAL STATUS PRIOR TO ADMISSION: Patient was independent without use of DME.    HOME SUPPORT PRIOR TO ADMISSION: The patient lived with daughter who able to give support as needed. Physical Therapy Goals  Initiated 2/14/2021  1. Patient will move from supine to sit and sit to supine  in bed with independence within 7 day(s). 2.  Patient will transfer from bed to chair and chair to bed with independence using the least restrictive device within 7 day(s). 3.  Patient will perform sit to stand with modified independence within 7 day(s). 4.  Patient will ambulate with independence for 300 feet with the least restrictive device within 7 day(s). 5.  Patient will ascend/descend 5 stairs with 1 handrail(s) with modified independence within 7 day(s). Outcome: Progressing Towards Goal   PHYSICAL THERAPY EVALUATION  Patient: Leighann Pollard (89 y.o. female)  Date: 2/14/2021  Primary Diagnosis: Aphasia [R47.01]        Precautions: fall       ASSESSMENT  Based on the objective data described below, the patient presents with unstable gait requiring min assist to CGA for safety. Per daughter in room patient has decreased vision( not new) and this may be affecting her balance today but daughter feels like patient close to baseline. Patient also with cramp left lower leg which according to her daughter happens frequently. Discussed having someone assist patient when she is out of bed for safety and HHPT. Patient does not use an assistive device at baseline however if she remains unsteady we may need to evaluate if an assistive device will make her safer and independent. Current Level of Function Impacting Discharge (mobility/balance): Min assist to [id number] for ambulation    Functional Outcome Measure:   The patient scored *15/28 on the tinetti outcome measure which is indicative of high risk for falls. Other factors to consider for discharge:      Patient will benefit from skilled therapy intervention to address the above noted impairments. PLAN :  Recommendations and Planned Interventions: transfer training and gait training      Frequency/Duration: Patient will be followed by physical therapy:  5 times a week to address goals. Recommendation for discharge: (in order for the patient to meet his/her long term goals)  Physical therapy at least 2 days/week in the home AND ensure assist and/or supervision for safety with out of bed activities    This discharge recommendation:  A follow-up discussion with the attending provider and/or case management is planned    IF patient discharges home will need the following DME: will continue to assess         SUBJECTIVE:   Patient stated I will walk.     OBJECTIVE DATA SUMMARY:   HISTORY:    History reviewed. No pertinent past medical history. History reviewed. No pertinent surgical history. Personal factors and/or comorbidities impacting plan of care: decreased vision per daughter         EXAMINATION/PRESENTATION/DECISION MAKING:   Critical Behavior:  Neurologic State: Alert, Confused  Orientation Level: Oriented to person, Disoriented to situation, Disoriented to time  Cognition: Impaired decision making       Range Of Motion:  AROM: Generally decreased, functional      PROM: Generally decreased, functional   Strength:    Strength: Generally decreased, functional         Tone & Sensation:   Tone: Normal       Coordination:  Coordination: Generally decreased, functional  Vision:    Decreased vision  Functional Mobility:  Bed Mobility:   Not assessed, patient sitting edge of bed on arrival and returned to sitting edge of bed   Transfers:  Sit to Stand: Contact guard assistance  Stand to Sit: Contact guard assistance      Balance:   Sitting: Intact; Without support  Standing: Impaired; Without support  Standing - Static: Fair  Standing - Dynamic : Fair  Ambulation/Gait Training:  Distance (ft): 125 Feet (ft)  Assistive Device: Gait belt  Ambulation - Level of Assistance: Minimal assistance(to CGA)   Gait Abnormalities: Decreased step clearance; Path deviations   Base of Support: Narrowed   Step Length: Left shortened;Right shortened              Functional Measure:  Tinetti test:    Sitting Balance: 1  Arises: 1  Attempts to Rise: 2  Immediate Standing Balance: 0  Standing Balance: 0  Nudged: 0  Eyes Closed: 0  Turn 360 Degrees - Continuous/Discontinuous: 0  Turn 360 Degrees - Steady/Unsteady: 0  Sitting Down: 1  Balance Score: 5 Balance total score  Indication of Gait: 1  R Step Length/Height: 1  L Step Length/Height: 1  R Foot Clearance: 1  L Foot Clearance: 1  Step Symmetry: 1  Step Continuity: 1  Path: 1  Trunk: 1  Walking Time: 1  Gait Score: 10 Gait total score  Total Score: 15/28 Overall total score         Tinetti Tool Score Risk of Falls  <19 = High Fall Risk  19-24 = Moderate Fall Risk  25-28 = Low Fall Risk  Tinetti ME. Performance-Oriented Assessment of Mobility Problems in Elderly Patients. [facility] 66; [id number]. (Scoring Description: PT Bulletin Feb. 10, 1993)    Older adults: Lisa Gilliland et al, 2009; n = 1000 [facility] elderly evaluated with ABC, JE, ADL, and IADL)  · Mean JE score for males aged 69-68 years = 26.21(3.40)  · Mean JE score for females age 69-68 years = 25.16(4.30)  · Mean JE score for males over 80 years = 23.29(6.02)  · Mean JE score for females over 80 years = 17.20(8.32)           Pain Rating:  C/o cramp left lower leg during ambulation    Activity Tolerance:   Good    After treatment patient left in no apparent distress:   Sitting edge of bed. Daughter in room    COMMUNICATION/EDUCATION:   The patients plan of care was discussed with: Registered nurse. Fall prevention education was provided and the patient/caregiver indicated understanding.  and Patient/family have participated as able in goal setting and plan of care.    Thank you for this referral.  Anirudh Poole, PT   Time Calculation: 21 mins

## 2021-02-15 ENCOUNTER — APPOINTMENT (OUTPATIENT)
Dept: NON INVASIVE DIAGNOSTICS | Age: 86
DRG: 312 | End: 2021-02-15
Attending: INTERNAL MEDICINE
Payer: MEDICARE

## 2021-02-15 VITALS
SYSTOLIC BLOOD PRESSURE: 161 MMHG | OXYGEN SATURATION: 95 % | DIASTOLIC BLOOD PRESSURE: 90 MMHG | TEMPERATURE: 97.9 F | RESPIRATION RATE: 15 BRPM | WEIGHT: 123 LBS | HEART RATE: 97 BPM

## 2021-02-15 LAB
ANION GAP SERPL CALC-SCNC: 5 MMOL/L (ref 5–15)
BUN SERPL-MCNC: 17 MG/DL (ref 6–20)
BUN/CREAT SERPL: 13 (ref 12–20)
C DIFF GDH STL QL: NEGATIVE
C DIFF TOX A+B STL QL IA: NEGATIVE
CALCIUM SERPL-MCNC: 8.1 MG/DL (ref 8.5–10.1)
CHLORIDE SERPL-SCNC: 116 MMOL/L (ref 97–108)
CO2 SERPL-SCNC: 16 MMOL/L (ref 21–32)
CREAT SERPL-MCNC: 1.34 MG/DL (ref 0.55–1.02)
ECHO AO ROOT DIAM: 2.95 CM
ECHO AV AREA PEAK VELOCITY: 1.36 CM2
ECHO AV PEAK GRADIENT: 3.38 MMHG
ECHO AV PEAK VELOCITY: 91.86 CM/S
ECHO EST RA PRESSURE: 8 MMHG
ECHO LA AREA 4C: 23.92 CM2
ECHO LA MAJOR AXIS: 4.69 CM
ECHO LA VOL 2C: 61.94 ML (ref 22–52)
ECHO LA VOL 4C: 72.47 ML (ref 22–52)
ECHO LA VOL BP: 72.31 ML (ref 22–52)
ECHO LV EDV A2C: 43.33 ML
ECHO LV EDV A4C: 60.89 ML
ECHO LV EDV BP: 57.11 ML (ref 56–104)
ECHO LV EJECTION FRACTION A2C: 38 PERCENT
ECHO LV EJECTION FRACTION A4C: 42 PERCENT
ECHO LV EJECTION FRACTION BIPLANE: 40.6 PERCENT (ref 55–100)
ECHO LV ESV A2C: 26.92 ML
ECHO LV ESV A4C: 35.39 ML
ECHO LV ESV BP: 33.91 ML (ref 19–49)
ECHO LV INTERNAL DIMENSION DIASTOLIC: 4.65 CM (ref 3.9–5.3)
ECHO LV INTERNAL DIMENSION DIASTOLIC: 5.17 CM (ref 3.9–5.3)
ECHO LV INTERNAL DIMENSION SYSTOLIC: 3.75 CM
ECHO LV INTERNAL DIMENSION SYSTOLIC: 4.79 CM
ECHO LV IVSD: 1.1 CM (ref 0.6–0.9)
ECHO LV POSTERIOR WALL DIASTOLIC: 1.02 CM (ref 0.6–0.9)
ECHO LVOT DIAM: 1.85 CM
ECHO LVOT PEAK GRADIENT: 0.87 MMHG
ECHO LVOT PEAK VELOCITY: 46.75 CM/S
ECHO MV A VELOCITY: 31.35 CM/S
ECHO MV AREA PHT: 4.13 CM2
ECHO MV E DECELERATION TIME (DT): 183.85 MS
ECHO MV E VELOCITY: 96.72 CM/S
ECHO MV E/A RATIO: 3.09
ECHO MV PRESSURE HALF TIME (PHT): 53.32 MS
ECHO PV PEAK INSTANTANEOUS GRADIENT SYSTOLIC: 2.97 MMHG
ECHO RIGHT VENTRICULAR SYSTOLIC PRESSURE (RVSP): 46.24 MMHG
ECHO RV INTERNAL DIMENSION: 3.79 CM
ECHO RV TAPSE: 1.96 CM (ref 1.5–2)
ECHO TV REGURGITANT MAX VELOCITY: 309.2 CM/S
ECHO TV REGURGITANT PEAK GRADIENT: 38.24 MMHG
GLUCOSE SERPL-MCNC: 95 MG/DL (ref 65–100)
INTERPRETATION: NORMAL
POTASSIUM SERPL-SCNC: 3.2 MMOL/L (ref 3.5–5.1)
SODIUM SERPL-SCNC: 137 MMOL/L (ref 136–145)

## 2021-02-15 PROCEDURE — 97116 GAIT TRAINING THERAPY: CPT

## 2021-02-15 PROCEDURE — 80048 BASIC METABOLIC PNL TOTAL CA: CPT

## 2021-02-15 PROCEDURE — 74011250637 HC RX REV CODE- 250/637: Performed by: INTERNAL MEDICINE

## 2021-02-15 PROCEDURE — 74011250636 HC RX REV CODE- 250/636: Performed by: INTERNAL MEDICINE

## 2021-02-15 PROCEDURE — 99232 SBSQ HOSP IP/OBS MODERATE 35: CPT | Performed by: PSYCHIATRY & NEUROLOGY

## 2021-02-15 PROCEDURE — 36415 COLL VENOUS BLD VENIPUNCTURE: CPT

## 2021-02-15 PROCEDURE — 97165 OT EVAL LOW COMPLEX 30 MIN: CPT

## 2021-02-15 PROCEDURE — 97530 THERAPEUTIC ACTIVITIES: CPT

## 2021-02-15 PROCEDURE — C8929 TTE W OR WO FOL WCON,DOPPLER: HCPCS

## 2021-02-15 PROCEDURE — 97535 SELF CARE MNGMENT TRAINING: CPT

## 2021-02-15 RX ORDER — FUROSEMIDE 20 MG/1
20 TABLET ORAL DAILY
Qty: 30 TAB | Refills: 0 | Status: SHIPPED
Start: 2021-02-15

## 2021-02-15 RX ORDER — ASPIRIN 81 MG/1
81 TABLET ORAL DAILY
Qty: 30 TAB | Refills: 0 | Status: SHIPPED
Start: 2021-02-16

## 2021-02-15 RX ORDER — MIDODRINE HYDROCHLORIDE 2.5 MG/1
2.5 TABLET ORAL
Qty: 90 TAB | Refills: 0 | Status: SHIPPED | OUTPATIENT
Start: 2021-02-15 | End: 2021-02-15 | Stop reason: SDUPTHER

## 2021-02-15 RX ORDER — MIDODRINE HYDROCHLORIDE 2.5 MG/1
2.5 TABLET ORAL
Qty: 90 TAB | Refills: 0 | Status: SHIPPED | OUTPATIENT
Start: 2021-02-15 | End: 2021-03-17

## 2021-02-15 RX ORDER — POTASSIUM CHLORIDE 750 MG/1
40 TABLET, FILM COATED, EXTENDED RELEASE ORAL
Status: COMPLETED | OUTPATIENT
Start: 2021-02-15 | End: 2021-02-15

## 2021-02-15 RX ORDER — MIDODRINE HYDROCHLORIDE 5 MG/1
2.5 TABLET ORAL
Status: DISCONTINUED | OUTPATIENT
Start: 2021-02-15 | End: 2021-02-15 | Stop reason: HOSPADM

## 2021-02-15 RX ORDER — MIRTAZAPINE 7.5 MG/1
7.5 TABLET, FILM COATED ORAL
Qty: 30 TAB | Refills: 0 | Status: SHIPPED
Start: 2021-02-15

## 2021-02-15 RX ORDER — ATORVASTATIN CALCIUM 20 MG/1
20 TABLET, FILM COATED ORAL
Qty: 30 TAB | Refills: 0 | Status: SHIPPED
Start: 2021-02-15

## 2021-02-15 RX ORDER — GABAPENTIN 100 MG/1
100 CAPSULE ORAL 2 TIMES DAILY
Qty: 60 CAP | Refills: 0 | Status: SHIPPED
Start: 2021-02-15

## 2021-02-15 RX ADMIN — APIXABAN 2.5 MG: 2.5 TABLET, FILM COATED ORAL at 10:40

## 2021-02-15 RX ADMIN — MIDODRINE HYDROCHLORIDE 2.5 MG: 5 TABLET ORAL at 13:05

## 2021-02-15 RX ADMIN — POTASSIUM CHLORIDE 40 MEQ: 750 TABLET, FILM COATED, EXTENDED RELEASE ORAL at 10:39

## 2021-02-15 RX ADMIN — GABAPENTIN 100 MG: 100 CAPSULE ORAL at 10:40

## 2021-02-15 RX ADMIN — APIXABAN 2.5 MG: 2.5 TABLET, FILM COATED ORAL at 17:46

## 2021-02-15 RX ADMIN — MIDODRINE HYDROCHLORIDE 2.5 MG: 5 TABLET ORAL at 17:45

## 2021-02-15 RX ADMIN — GABAPENTIN 100 MG: 100 CAPSULE ORAL at 17:45

## 2021-02-15 RX ADMIN — ACETAMINOPHEN 650 MG: 325 TABLET ORAL at 10:40

## 2021-02-15 RX ADMIN — ASPIRIN 81 MG: 81 TABLET, COATED ORAL at 10:40

## 2021-02-15 RX ADMIN — PERFLUTREN 2 ML: 6.52 INJECTION, SUSPENSION INTRAVENOUS at 16:22

## 2021-02-15 NOTE — PROGRESS NOTES
Cardiology Progress Note  2/15/2021     Admit Date: 2021  Admit Diagnosis: Aphasia [R47.01]  CC: none currently    Assessment/Plan:   Up with PT. Very shaky. Only modest BP drop from supine to standin systolic to 089 systolic  Suspect sitting for prolonged period of time with slowly decreasing pressure then syncope. Empiric trial of low dose midodrine  MRI to be done  Echo to be done   For other plans, see orders. Subjective: Adi Birch reports   Chest Pain:  [x]  none;  consistent with []  non-cardiac  []  atypical  []  angina             [x]  none now    []  on-going  Dyspnea: [x]  none    []  at rest    []  with exertion   []  improved    []  unchanged    []  worsening  PND:       [x]  none      []  overnight      Orthopnea:   [x]  none        []  improved         []  unchanged        []  worsening  Presyncope: [x]  none        []  improved         []  unchanged        []  worsening  Ambulated in hallway without symptoms  []  Yes  Ambulated in room without symptoms  []  Yes    Objective:    Physical Exam:  Overall VSSAF;    Visit Vitals  BP (!) 140/86 (BP 1 Location: Right upper arm, BP Patient Position: Standing)   Pulse 78   Temp 98.1 °F (36.7 °C)   Resp 17   Wt 56 kg (123 lb 6.4 oz)   SpO2 94%     Temp (24hrs), Av.9 °F (36.6 °C), Min:97.4 °F (36.3 °C), Max:98.1 °F (36.7 °C)    Patient Vitals for the past 8 hrs:   Pulse   02/15/21 0600 78   02/15/21 0215 68    Patient Vitals for the past 8 hrs:   Resp   02/15/21 0600 17   02/15/21 0215 16    Patient Vitals for the past 8 hrs:   BP   02/15/21 0926 (!) 140/86   02/15/21 0923 (!) 150/108   02/15/21 0915 132/81   02/15/21 0600 137/77   02/15/21 0215 (!) 145/100      No intake or output data in the 24 hours ending 02/15/21 0936    General Appearance: Well developed, well nourished, no acute distress. Ears/Nose/Mouth/Throat:   Normal MM; anicteric. JVP: WNL   Resp:   Lungs clear to auscultation bilaterally. Nl resp effort. Cardiovascular:  RRR, S1, S2 normal, no new murmur. No gallop or rub. Abdomen:   Soft, non-tender, bowel sounds are present. Extremities: No edema bilaterally. Skin:  Neuro: Warm and dry. A/O x3, grossly nonfocal    []  cath site intact w/o hematoma or bruit; distal pulse unchanged. Data Review:     Telemetry independently reviewed : []  sinus      []  chronic afib     []  par afib    []  NSVT    ECG independently reviewed:  []  NSR         []  no significant changes  [] no new ECG provided for review  Lab results reviewed as noted below. Current medications reviewed as noted below. No results for input(s): PH, PCO2, PO2 in the last 72 hours. Recent Labs     02/13/21  1322   TROIQ <0.05     Recent Labs     02/15/21  0241 02/14/21  1707 02/14/21  0346 02/13/21  1322    136  --  140   K 3.2* 3.6  --  3.7   * 112*  --  107   CO2 16* 19*  --  27   BUN 17 19  --  23*   CREA 1.34* 1.41*  --  1.69*   GLU 95 88  --  180*   CA 8.1* 8.1*  --  9.5   ALB  --   --   --  3.5   WBC  --   --  6.4 6.4   HGB  --   --  11.6 13.4   HCT  --   --  36.1 41.0   PLT  --   --  187 192     Recent Labs     02/13/21  1322   ALT 17   AP 93   TBILI 0.8   TP 7.0   ALB 3.5   GLOB 3.5     Recent Labs     02/13/21  1322   INR 1.1   PTP 11.3*   APTT 25.4      No results for input(s): FE, TIBC, PSAT, FERR in the last 72 hours.    No results found for: GLUCPOC    Current Facility-Administered Medications   Medication Dose Route Frequency    potassium chloride SR (KLOR-CON 10) tablet 40 mEq  40 mEq Oral NOW    acetaminophen (TYLENOL) tablet 650 mg  650 mg Oral Q4H PRN    Or    acetaminophen (TYLENOL) solution 650 mg  650 mg Per NG tube Q4H PRN    Or    acetaminophen (TYLENOL) suppository 650 mg  650 mg Rectal Q4H PRN    0.9% sodium chloride infusion  75 mL/hr IntraVENous CONTINUOUS    melatonin tablet 3 mg  3 mg Oral QHS PRN    aspirin delayed-release tablet 81 mg  81 mg Oral DAILY    apixaban (ELIQUIS) tablet 2.5 mg 2.5 mg Oral BID    [Held by provider] furosemide (LASIX) tablet 20 mg  20 mg Oral DAILY    gabapentin (NEURONTIN) capsule 100 mg  100 mg Oral BID    [Held by provider] metoprolol succinate (TOPROL-XL) XL tablet 50 mg  50 mg Oral DAILY    mirtazapine (REMERON) tablet 7.5 mg  7.5 mg Oral QHS    atorvastatin (LIPITOR) tablet 20 mg  20 mg Oral QHS        Wil Hendricks MD

## 2021-02-15 NOTE — CONSULTS
[address]    Name:  Nenita Rodríguez  MR#:  [id number]  :  1926  ACCOUNT #:  [de-identified]  DATE OF SERVICE:  2021    NEUROLOGY CONSULTATION    HISTORY OF PRESENT ILLNESS:  This is a 79-year-old right-handed female who was admitted overnight after an episode of syncope. The patient is seen with her daughter at the bedside who aids greatly in the history as the patient has baseline dementia. The patient recalls that she was sitting at breakfast yesterday and just did not feel well. Daughter noticed she was not quite acting like herself, she then suddenly lost consciousness. She was out for about 5 minutes. She has had one prior episode of syncope last year while sitting on the toilet that was attributed possibly to AFib. She is also noted to have labile blood pressures with a blood pressure of 102/91 at presentation. She also has been noted to have diarrhea since presentation, but daughter reports she will have this intermittently chronically, treated with bananas. The patient does have AFib and is on Eliquis 2.5 mg b.i.d. at home. She also has ischemic stroke risk factors of hyperlipidemia with an LDL of 91, on simvastatin; coronary artery disease with history of two MIs and stenting in  and ischemic cardiomyopathy with an EF of 25% in 2017 and has a history of embolic stroke with residual expressive aphasia related to this. The patient is not on aspirin at home but has been started on aspirin since admission. Her CT of the head showed atrophy, chronic ischemic changes, old right occipital infarct. Carotid Doppler show less than 50% stenosis. Echocardiogram and MRI are pending. Her BUN was 23, creatinine 1.69, glucose 180. EKG confirmed AFib. PAST MEDICAL HISTORY:  1.  DNR. 2.  AFib, on Eliquis. 3.  History of embolic stroke with expressive aphasia. 4.  Hyperlipidemia, on simvastatin.   5.  Coronary artery disease with history of MI x2 and stenting in 2014. 6.  Ischemic cardiomyopathy with an EF of 25% in 2017.  7.  Peripheral neuropathy. 8.  Orthostatic hypotension and labile blood pressures. 9.  Peripheral vascular disease. 10.  Status post cholecystectomy. 11.  Dementia. MEDICATIONS AT HOME:  The exact medication home list is not known as this has not been entered in the computer but daughter confirmed she is on Eliquis 2.5 mg b.i.d., not on aspirin and is on simvastatin, unknown dose at home. Here, she has been started on:  1. Melatonin. 2.  Aspirin 81 mg a day. 3.  Continued on Eliquis. 4.  She is on Lasix 20 mg a day. 5.  Neurontin 100 mg b.i.d.  6.  Metoprolol XL 50 mg a day. 7.  Remeron 7.5 mg at bedtime. 8.  Lipitor 20 mg a day. ALLERGIES:  NONE. SOCIAL HISTORY:  She lives with her daughter and her family. No tobacco, alcohol, or drug use. She was a teacher. FAMILY HISTORY:  No neurological problems in the family. REVIEW OF SYSTEMS:  Limited due to patient factors. PHYSICAL EXAMINATION:  VITAL SIGNS:  Blood pressure currently 112/67, pulse 99, respiratory rate 24, satting 100% on room air, temperature is 98.1. She has been afebrile. GENERAL:  She is a thin, elderly female sitting on the side of bed in no distress. HEART:  Irregularly irregular. Carotids, no bruits. EXTREMITIES:  No edema . Radial pulses are 2+. NEUROLOGIC EXAMINATION:  Mental status, she is alert. She is oriented to person, place, [place] day but clearly has impaired memory to history. She also becomes somewhat agitated with questioning. Daughter notes that this is her baseline. Her speech is not dysarthric. She is able to follow commands with occasional prompting. Cranial nerves, no facial asymmetry or ptosis. Extraocular eye movements are intact. Pupils round, reactive. Strength, sensation, and hearing intact. Motor exam is 5/5 throughout. No pronator drift. No tremor. Sensory exam intact to light touch.   Reflexes symmetric. Coordination, intact to finger-to-nose, heel-to-shin not attempted. Gait not assessed. LABORATORY DATA:  Studies and reports reviewed above in the HPI. ASSESSMENT AND PLAN:  This is a 89-year right-handed female presenting after an episode of syncope with prodrome of not feeling well with known orthostatic hypotension and labile blood pressures as well as atrial fibrillation. She does have multiple stroke risk factors. I think this is less likely etiology, but MRI of the brain is ordered to fully exclude stroke. The patient has already been seen by Dr. Raúl Funes from Cardiology, and again, echo is pending.       Zachariah Holder MD      [id number]  D:  02/14/2021 16:55  T:  02/14/2021 21:37  JOB #:  [id number]

## 2021-02-15 NOTE — CONSULTS
Neurology Progress Note    Patient ID:  Rufino Shannon  [id number]  54 y.o.  11/25/1926    Chief Complaint: Loss of consciousness    Subjective:     Rickey Ruvalcaba is a 80-year-old lady was admitted for syncopal event. She was admitted February 13. She is a new patient for me as I am assuming her neurologic evaluation. I reviewed the medical record and spoke with her personally. It sounds like she woke up on the morning of the 13th like usual and sat down at the table and then slowly lost consciousness for possibly 5 minutes. No seizure activity. No confusion. She is not a good historian fortunately. She tells me she feels her baseline. She is on low-dose aspirin and Eliquis prior to admission for history of coronary artery disease and A. fib. History of old stroke with some residual aphasia by report. MRI was completed yesterday showing no acute issue but it does show moderate to severe chronic ischemic changes.     Objective:       ROS:  Cannot obtain secondary to patient medical condition  Poor historian    Meds:  Current Facility-Administered Medications   Medication Dose Route Frequency    potassium chloride SR (KLOR-CON 10) tablet 40 mEq  40 mEq Oral NOW    midodrine (PROAMATINE) tablet 2.5 mg  2.5 mg Oral TID WITH MEALS    acetaminophen (TYLENOL) tablet 650 mg  650 mg Oral Q4H PRN    Or    acetaminophen (TYLENOL) solution 650 mg  650 mg Per NG tube Q4H PRN    Or    acetaminophen (TYLENOL) suppository 650 mg  650 mg Rectal Q4H PRN    0.9% sodium chloride infusion  75 mL/hr IntraVENous CONTINUOUS    melatonin tablet 3 mg  3 mg Oral QHS PRN    aspirin delayed-release tablet 81 mg  81 mg Oral DAILY    apixaban (ELIQUIS) tablet 2.5 mg  2.5 mg Oral BID    [Held by provider] furosemide (LASIX) tablet 20 mg  20 mg Oral DAILY    gabapentin (NEURONTIN) capsule 100 mg  100 mg Oral BID    [Held by provider] metoprolol succinate (TOPROL-XL) XL tablet 50 mg  50 mg Oral DAILY    mirtazapine (REMERON) tablet 7.5 mg  7.5 mg Oral QHS    atorvastatin (LIPITOR) tablet 20 mg  20 mg Oral QHS       MRI Results (maximum last 3): Results from [place] encounter on 02/13/21   MRI BRAIN WO CONT    Narrative EXAM: MRI BRAIN WO CONT    INDICATION: Aphasia    COMPARISON: CT head 2/13/2021. CONTRAST: None. TECHNIQUE:    Multiplanar multisequence acquisition without contrast of the brain. FINDINGS:  Generalized parenchymal volume loss with commensurate dilation of the sulci and  ventricular system. Confluent periventricular deep white matter T2/FLAIR  hyperintensities, with associated chronic infarcts in the bilateral corona  radiata, consistent with severe chronic microvascular ischemic disease. There  are chronic infarcts noted in the right occipital lobe (with associated  petechial hemorrhage and cortical laminar necrosis), right frontal lobe, right  thalamus and bilateral cerebellum. There is no acute infarct, hemorrhage,  extra-axial fluid collection, or mass effect. There is no cerebellar tonsillar  herniation. Expected arterial flow-voids are present. The paranasal sinuses, mastoid air cells, and middle ears are clear. The orbital  contents are within normal limits with bilateral lens implants. No significant  osseous or scalp lesions are identified. Impression 1. No acute intracranial abnormality. 2. Generalized parenchymal volume loss and severe chronic microvascular ischemic  disease. Multiple chronic supratentorial and infratentorial infarcts as above,  largest in the right occipital lobe.        Lab Review   Recent Results (from the past 24 hour(s))   DUPLEX CAROTID BILATERAL    Collection Time: 02/14/21 10:57 AM   Result Value Ref Range    Right cca dist sys 54.4 cm/s    Right CCA dist vila 14.2 cm/s    Right CCA prox sys 73.2 cm/s    Right CCA prox vila 16.4 cm/s    Right eca sys 95.6 cm/s    RIGHT EXTERNAL CAROTID ARTERY D 0.00 cm/s    Right ICA dist sys 49.1 cm/s    Right ICA dist vila 17.7 cm/s    Right ICA mid sys 47.4 cm/s    Right ICA mid vila 9.0 cm/s    Right ICA prox sys 67.4 cm/s    Right ICA prox vila 14.6 cm/s    Right vertebral sys 28.9 cm/s    RIGHT VERTEBRAL ARTERY D 0.00 cm/s    Right ICA/CCA sys 1.2     Left CCA dist sys 56.4 cm/s    Left CCA dist vila 10.1 cm/s    Left CCA prox sys 45.0 cm/s    Left CCA prox vila 8.4 cm/s    Left ECA sys 98.7 cm/s    LEFT EXTERNAL CAROTID ARTERY D 0.00 cm/s    Left ICA dist sys 55.6 cm/s    Left ICA dist vila 15.0 cm/s    Left ICA mid sys 69.6 cm/s    Left ICA mid vila 14.0 cm/s    Left ICA prox sys 81.5 cm/s    Left ICA prox vila 22.0 cm/s    Left vertebral sys 30.5 cm/s    LEFT VERTEBRAL ARTERY D 8.55 cm/s    Left ICA/CCA sys 4.22    METABOLIC PANEL, BASIC    Collection Time: 02/14/21  5:07 PM   Result Value Ref Range    Sodium 136 136 - 145 mmol/L    Potassium 3.6 3.5 - 5.1 mmol/L    Chloride 112 (H) 97 - 108 mmol/L    CO2 19 (L) 21 - 32 mmol/L    Anion gap 5 5 - 15 mmol/L    Glucose 88 65 - 100 mg/dL    BUN 19 6 - 20 MG/DL    Creatinine 1.41 (H) 0.55 - 1.02 MG/DL    BUN/Creatinine ratio 13 12 - 20      GFR est AA 42 (L) >60 ml/min/1.73m2    GFR est non-AA 35 (L) >60 ml/min/1.73m2    Calcium 8.1 (L) 8.5 - 01.6 MG/DL   METABOLIC PANEL, BASIC    Collection Time: 02/15/21  2:41 AM   Result Value Ref Range    Sodium 137 136 - 145 mmol/L    Potassium 3.2 (L) 3.5 - 5.1 mmol/L    Chloride 116 (H) 97 - 108 mmol/L    CO2 16 (L) 21 - 32 mmol/L    Anion gap 5 5 - 15 mmol/L    Glucose 95 65 - 100 mg/dL    BUN 17 6 - 20 MG/DL    Creatinine 1.34 (H) 0.55 - 1.02 MG/DL    BUN/Creatinine ratio 13 12 - 20      GFR est AA 45 (L) >60 ml/min/1.73m2    GFR est non-AA 37 (L) >60 ml/min/1.73m2    Calcium 8.1 (L) 8.5 - 10.1 MG/DL       Additional comments:I reviewed the patient's new clinical lab test results. and I reviewed the patients new imaging test results.      Patient Vitals for the past 8 hrs:   BP Temp Pulse Resp SpO2   02/15/21 0938 (!) 154/90     02/15/21 0926 (!) 140/86       02/15/21 0923 (!) 150/108       02/15/21 0915 132/81       02/15/21 0600 137/77 98.1 °F (36.7 °C) 78 17 94 %       No intake/output data recorded. No intake/output data recorded. Exam:  Visit Vitals  BP (!) 154/90 (BP 1 Location: Right upper arm, BP Patient Position: Sitting)   Pulse 78   Temp 98.1 °F (36.7 °C)   Resp 17   Wt 123 lb 6.4 oz (56 kg)   SpO2 94%     Gen: Well developed  CV: RRR  Lungs: non labored breathing  Abd: soft, non distended  Neuro: Awake, very hearing impaired. She can follow commands simply. She knows it is February and she knows her location but she thinks it is 1. CN II-XII: PERRL, EOMI, face grossly symmetric, tongue/palate midline  Motor: Moving all extremities spontaneously sitting upright. Sensory: intact to LT  Gait: Deferred    PROBLEM LIST:     Patient Active Problem List   Diagnosis Code    Aphasia R47.01       Assessment/Plan:      72-year-old woman who had an episode of loss of consciousness which may have been in the setting of slowly progressive acute hypotension. She has had these kinds of events before. Imaging fortunately does not show any areas of acute ischemia. She does have a lot of chronic ischemic changes noted. She has low threshold for mental status changes as a result. She did not have an acute stroke. I would not change her antiplatelets or anticoagulation at this time. Vascular imaging could be done but I do not think it would  necessarily given her age as I would be concerned that increasing her antiplatelets could have more bleeding risk. Echo should be done. I imagine she might have some pressure dependent brain perfusion requirements. Avoid hypotension as much as possible. Okay for blood pressure to be slightly higher in her case cardiac permitting. We will follow peripherally. Please call if needed.       This clinical note was dictated with an electronic dictation software that can make unintentional errors. If there are any questions, please contact me directly for clarification.       Signed:  [address], DO  2/15/2021  10:17 AM

## 2021-02-15 NOTE — PROGRESS NOTES
Problem: Self Care Deficits Care Plan (Adult)  Goal: *Acute Goals and Plan of Care (Insert Text)  Description:   FUNCTIONAL STATUS PRIOR TO ADMISSION: Patient poor historian -  ambulated independently w/o AD prior to admission, was able to dress self day of admission per CM note    HOME SUPPORT: The patient lived with daughter and son-in-law w/ 24/7 supervision. Occupational Therapy Goals  Initiated 2/15/2021  1. Patient will perform standing grooming with supervision/set-up within 7 day(s). 2.  Patient will perform upper body dressing with supervision/set-up within 7 day(s). 3.  Patient will perform lower body dressing with supervision/et-up within 7 day(s). 4.  Patient will perform toilet transfers with supervision/set-up within 7 day(s). 5.  Patient will perform all aspects of toileting with supervision/set-up within 7 day(s). 6.  Patient will participate in upper extremity therapeutic exercise/activities with supervision/set-up for 5 minutes within 7 day(s). 7.  Patient will utilize energy conservation techniques during functional activities with verbal, visual, and tactile cues within 7 day(s). Outcome: Not Met    OCCUPATIONAL THERAPY EVALUATION  Patient: Emanuel Ansari (1 y.o. female)  Date: 2/15/2021  Primary Diagnosis: Aphasia [R47.01]       Precautions: None       ASSESSMENT  Based on the objective data described below, the patient presents with decreased balance, strength, coordination, cognition, problem solving, sequencing, and command following s/p admission for syncopal episode. Prior to admission patient lived w/ daughter but was independent w/ mobility. Infer she may have needed occasional help w/ self-care 2/2 baseline dementia, but able to complete some tasks independently per CM note. Patient requiring up to min A for self-care and mobility w/ ambulation <> bathroom this session.  No OH noted w/ transition from supine>EOB>standing (see below), but patient demonstrating brief LOB w/ static standing at EOB. Required mod multi-modal cues for in-room navigation and task sequencing. Recommend d/c home w/ HHOT and physical A for all ADL and mobility tasks, vs. SNF pending progress/family ability to provide needed level of care. Vitals:    02/15/21 0915 02/15/21 0923 02/15/21 0926 02/15/21 0938   BP: 132/81 (!) 150/108 (!) 140/86 (!) 154/90   BP 1 Location: Right arm Right arm Right upper arm Right upper arm   BP Patient Position: Supine Sitting Standing Sitting        Current Level of Function Impacting Discharge (ADLs/self-care): Up to min A for mobility and self-care. Functional Outcome Measure: The patient scored Total: 35/100 on the Barthel Index outcome measure which is indicative of 65% impaired ability to care for basic self needs/dependency on others; inferred 75% dependency on others for instrumental ADLs. Other factors to consider for discharge: High fall risk, need for 24/7A     Patient will benefit from skilled therapy intervention to address the above noted impairments. PLAN :  Recommendations and Planned Interventions: self care training, functional mobility training, therapeutic exercise, balance training, therapeutic activities, cognitive retraining, endurance activities, patient education, home safety training, and family training/education    Frequency/Duration: Patient will be followed by occupational therapy 5 times a week to address goals.     Recommendation for discharge: (in order for the patient to meet his/her long term goals)  Occupational therapy at least 2 days/week in the home AND ensure assist and/or supervision for safety with all mobility/ADL/IADLs  vs. SNF pending progress/family ability to provide needed A    This discharge recommendation:  Has been made in collaboration with the attending provider and/or case management    IF patient discharges home will need the following DME: TBD        SUBJECTIVE:   Patient stated I don't even know where I'm supposed to be going.     OBJECTIVE DATA SUMMARY:   HISTORY:   History reviewed. No pertinent past medical history. History reviewed. No pertinent surgical history. Expanded or extensive additional review of patient history:     Home Situation  Living Alone: No  Support Systems: Child(barry)  Current DME Used/Available at Home: Cane, straight  Tub or Shower Type: Tub/Shower combination    Hand dominance: Right    EXAMINATION OF PERFORMANCE DEFICITS:  Cognitive/Behavioral Status:  Neurologic State: Alert  Orientation Level: Oriented to person;Oriented to place; Disoriented to situation;Disoriented to time  Cognition: Decreased command following;Decreased attention/concentration             Skin: Appears intact     Edema: None apparant    Hearing: Auditory  Auditory Impairment: Hearing aid(s)    Vision/Perceptual:    Tracking: Able to track stimulus in all quadrants w/o difficulty                      Acuity: Within Defined Limits         Range of Motion:    AROM: Generally decreased, functional  PROM: Generally decreased, functional                      Strength:    Strength: Generally decreased, functional                Coordination:  Coordination: Generally decreased, functional  Fine Motor Skills-Upper: Left Intact; Right Intact    Gross Motor Skills-Upper: Left Intact; Right Intact    Tone & Sensation:    Tone: Normal  Sensation: Intact                      Balance:  Sitting: Impaired; Without support  Sitting - Static: Good (unsupported)  Sitting - Dynamic: Fair (occasional)  Standing: Impaired; With support  Standing - Static: Fair  Standing - Dynamic : Fair    Functional Mobility and Transfers for ADLs:  Bed Mobility:  Supine to Sit: Moderate assistance  Scooting: Contact guard assistance    Transfers:  Sit to Stand: Contact guard assistance  Stand to Sit: Contact guard assistance  Bed to Chair: Minimum assistance  Bathroom Mobility: Minimum assistance  Toilet Transfer : Contact guard assistance    ADL Assessment:  Feeding: Setup    Oral Facial Hygiene/Grooming: Minimum assistance    Bathing: Minimum assistance    Upper Body Dressing: Minimum assistance    Lower Body Dressing: Minimum assistance    Toileting: Minimum assistance                ADL Intervention and task modifications:       Grooming  Grooming Assistance: Contact guard assistance  Washing Hands: Contact guard assistance                   Lower Body Dressing Assistance  Protective [place]: Minimum assistance  Socks: Contact guard assistance(simulated)    Toileting  Toileting Assistance: Minimum assistance  Bladder Hygiene: Contact guard assistance  Clothing Management: Minimum assistance    Functional Measure:  Barthel Index:    Bathin  Bladder: 5  Bowels: 5  Groomin  Dressin  Feedin  Mobility: 0  Stairs: 0  Toilet Use: 5  Transfer (Bed to Chair and Back): 10  Total: 35/100        The Barthel ADL Index: Guidelines  1. The index should be used as a record of what a patient does, not as a record of what a patient could do. 2. The main aim is to establish degree of independence from any help, physical or verbal, however minor and for whatever reason. 3. The need for supervision renders the patient not independent. 4. A patient's performance should be established using the best available evidence. Asking the patient, friends/relatives and nurses are the usual sources, but direct observation and common sense are also important. However direct testing is not needed. 5. Usually the patient's performance over the preceding 24-48 hours is important, but occasionally longer periods will be relevant. 6. Middle categories imply that the patient supplies over 50 per cent of the effort. 7. Use of aids to be independent is allowed. Isaac Velasquez., Barthel, D.W. (4150). Functional evaluation: the Barthel Index. [address] [facility] (14)2. Fabi Hanson allie JASVIR Quinteros, Yany Ta., Severa Mouse., [place], [address] [place] [address] ().  Measuring the change indisability after inpatient rehabilitation; comparison of the responsiveness of the Barthel Index and Functional [place] Measure. Journal of Neurology, Neurosurgery, and Psychiatry, 66(4), 302-800. RASHAAD Parikh, SHARATH Stewart, & Dolores Vargas M.A. (2004.) Assessment of post-stroke quality of life in cost-effectiveness studies: The usefulness of the Barthel Index and the EuroQoL-5D. Quality of Life Research, 15, 600-35         Occupational Therapy Evaluation Charge Determination   History Examination Decision-Making   LOW Complexity : Brief history review  LOW Complexity : 1-3 performance deficits relating to physical, cognitive , or psychosocial skils that result in activity limitations and / or participation restrictions  MEDIUM Complexity : Patient may present with comorbidities that affect occupational performnce. Miniml to moderate modification of tasks or assistance (eg, physical or verbal ) with assesment(s) is necessary to enable patient to complete evaluation       Based on the above components, the patient evaluation is determined to be of the following complexity level: LOW   Pain Rating:  Pain indicated w/ use of BP cuff     Activity Tolerance:   Fair    After treatment patient left in no apparent distress:    Sitting in chair, Call bell within reach, and Bed / chair alarm activated    COMMUNICATION/EDUCATION:   The patients plan of care was discussed with: Physical therapist and Registered nurse. Home safety education was provided and the patient/caregiver indicated understanding., Patient/family have participated as able in goal setting and plan of care. , and Patient/family agree to work toward stated goals and plan of care. This patients plan of care is appropriate for delegation to [facility]. Thank you for this referral.  ZAHIDA Joy  Time Calculation: 38 mins    Regarding student involvement in patient care:  A student participated in this treatment session.  Per CMS Medicare statements and AOTA guidelines I certify that the following was true:  1. I was present and directly observed the entire session. 2. I made all skilled judgments and clinical decisions regarding care. 3. I am the practitioner responsible for assessment, treatment, and documentation.

## 2021-02-15 NOTE — DISCHARGE SUMMARY
Discharge Summary     Patient:  Wendie King       MRN: [id number]       YOB: 1926       Age: 80 y.o. Date of admission:  2/13/2021    Date of discharge:  2/15/2021    Primary care provider: Dr. Jamar Jerome MD    Admitting provider:  Boby Vaca MD    Discharging provider:  Kris Bermudez [address] [phone]. If unavailable, call [phone] and ask the  to page the triage hospitalist.    Consultations  · IP CONSULT TO CARDIOLOGY  · IP CONSULT TO NEUROLOGY    Procedures  · * No surgery found *    Discharge destination: home with [facility]. The patient is stable for discharge. Admission diagnosis  · Aphasia [R47.01]    Current Discharge Medication List      START taking these medications    Details   gabapentin (NEURONTIN) 100 mg capsule Take 1 Cap by mouth two (2) times a day. Max Daily Amount: 200 mg. Qty: 60 Cap, Refills: 0    Associated Diagnoses: History of embolic stroke      atorvastatin (LIPITOR) 20 mg tablet Take 1 Tab by mouth nightly. Qty: 30 Tab, Refills: 0      aspirin delayed-release 81 mg tablet Take 1 Tab by mouth daily. Qty: 30 Tab, Refills: 0      apixaban (ELIQUIS) 2.5 mg tablet Take 1 Tab by mouth two (2) times a day. Qty: 60 Tab, Refills: 0      mirtazapine (REMERON) 7.5 mg tablet Take 1 Tab by mouth nightly. Qty: 30 Tab, Refills: 0      furosemide (LASIX) 20 mg tablet Take 1 Tab by mouth daily. Qty: 30 Tab, Refills: 0      midodrine (PROAMATINE) 2.5 mg tablet Take 1 Tab by mouth three (3) times daily (with meals) for 30 days. Qty: 90 Tab, Refills: 0             Follow-up Information     Follow up With Specialties Details Why Contact Info     [facility]/Please call this agency when you get home.  [address]    Jamar Jerome MD Internal Medicine   [address]  [phone]            Final discharge diagnoses and brief hospital course  80year old female patient with PMH of Afib on Eliquis came to ED for evaluation of syncope. Pt is alert but confused  - she is a poor historian. No family at bedside. Most of hx obtained from ED records \" She just arrived in [place] from [place] in October of last year. Jason Biggs has seen Dr. Bessie Penn with cardiology once recently for her chronic atrial fibrillation.  According to the daughter, she had one episode of syncope about a year ago related to her atrial fibrillation while she was sitting on the toilet.  The daughter states that the patient got up normally this morning, dressed herself, put her ear aids in place and got herself dressed. Jason Biggs has had no fever or chills, nausea or vomiting and no GI or  symptoms.  The patient is complained of no chest pain, nausea or vomiting and no abdominal pain.   The daughter stated that she was her normal self, again, noting that she has a difficult time expressing herself and often cannot get her words out.  She sat down to eat at the breakfast table and apparently blacked out.  She was out for approximately 5 minutes.  When she came around, the daughter stated that she was acting more like herself. \"      Spoke with daughter Niecy - pt had similar episodes of syncope in the past. She attributes it to Afib. Hx labile BP. This episode happened while seated. He speech is slow which is her baseline but she has no issues with memeory  Explained to do MRI brain and she agreed. Cardiology and neurology consults. She confirmed that patient has living will and she is DNR and her sister Allyn Shahid is [place]. Syncope - unclear etiology   CVA ruled out . Could be due to hypotension   - CT head: Significant small vessel ischemic changes. Old right occipital infarct. No acute abnormality.   - neuro checks  - lipid panel LDL 91 and A1c 5.4  - carotid duplex: Evidence of mild, less than 50%, stenosis in the right and left internal carotid arteries  - MRI brain: No acute intracranial abnormality. Generalized parenchymal volume loss and severe chronic microvascular ischemicdisease. Multiple chronic supratentorial and infratentorial infarcts as above, largest in the right occipital lobe. - appreciate neurology input \" episode of loss of consciousness which may have been in the setting of slowly progressive acute hypotension\"  - c/w aspirin and statin   - orthostatics negative  - cardiology : \" Suspect sitting for prolonged period of time with slowly decreasing pressure then syncope. Empiric trial of low dose midodrine\"      Elevated cr. Unknown baseline - improving   - cr 1.69 on poa   - on  IVF  - will monitor renal function     Lactic acidosis 5.1 - resolved   - unclear etiology  - no signs of infection. Could be due to dehydration  - UA negative. CXR clear  - blood cx  NGTD  - s/p IVF NS bolus in ED and c/w IVF  - diarrhea on 2/14- resolved. c diff negative   - will monitor     Chronic Afib   Hx chronic CHF  - rate controlled. C/w Eliquis . metoprolol on hold   - echo:  EF 30 - 35%. Moderate-to-severely reduced systolic function  - appreciate cardiology input   - discussed with cardiology Dr. Rhina Ricardo - ok to DC, not a candidate for ICD, recs to f/u with her cardiology      Peripheral neuropathy - gabapentin  HLD- statin  Hx CVA - c/w aspirin, statin    Discharge recommendations:  PCP f/u in 1 week  Cardiology in 2 weeks   Hold metoprolol. Started on midodrine    Discharge plan discussed with patient's daughter in detail she understood and agreed.      Physical examination at discharge  Visit Vitals  BP (!) 154/90   Pulse 88   Temp 97.6 °F (36.4 °C)   Resp 10   Wt 55.8 kg (123 lb)   SpO2 95%     Gen: NAD, elderly   HEENT: anicteric sclerae, normal conjunctiva, oropharynx clear, MM moist  Neck: supple, trachea midline, no adenopathy  Heart: RRR, no MRG, no JVD, no peripheral edema  Lungs: CTA b/l, non-labored respirations  Abd: soft, NT, ND, BS+, no organomegaly  Extr: warm  Skin: dry, no rash  Neuro: normal speech, moves all extremities  Psych: normal mood, appropriate affect       Pertinent imaging studies:    Per EMR     Recent Labs     02/14/21  0346 02/13/21  1322   WBC 6.4 6.4   HGB 11.6 13.4   HCT 36.1 41.0    192     Recent Labs     02/15/21  0241 02/14/21  1707 02/13/21  1322    136 140   K 3.2* 3.6 3.7   * 112* 107   CO2 16* 19* 27   BUN 17 19 23*   CREA 1.34* 1.41* 1.69*   GLU 95 88 180*   CA 8.1* 8.1* 9.5     Recent Labs     02/13/21  1322   AP 93   TP 7.0   ALB 3.5   GLOB 3.5     Recent Labs     02/13/21  1322   INR 1.1   PTP 11.3*   APTT 25.4      No results for input(s): FE, TIBC, PSAT, FERR in the last 72 hours. No results for input(s): PH, PCO2, PO2 in the last 72 hours. No results for input(s): CPK, CKMB in the last 72 hours. No lab exists for component: TROPONINI  No components found for: [place]    Chronic Diagnoses:    Problem List as of 2/15/2021 Never Reviewed          Codes Class Noted - Resolved    Aphasia ICD-10-CM: R47.01  ICD-9-CM: 784.3  2/13/2021 - Present              Time spent on discharge related activities today greater than 30 minutes.       Signed:  Jay Choi MD                 Hospitalist, Internal Medicine      Cc: Wicho Edmond MD

## 2021-02-15 NOTE — PROGRESS NOTES
Problem: Mobility Impaired (Adult and Pediatric)  Goal: *Acute Goals and Plan of Care (Insert Text)  Description: FUNCTIONAL STATUS PRIOR TO ADMISSION: Patient was independent without use of DME.    HOME SUPPORT PRIOR TO ADMISSION: The patient lived with daughter who able to give support as needed. Physical Therapy Goals  Initiated 2/14/2021  1. Patient will move from supine to sit and sit to supine  in bed with independence within 7 day(s). 2.  Patient will transfer from bed to chair and chair to bed with independence using the least restrictive device within 7 day(s). 3.  Patient will perform sit to stand with modified independence within 7 day(s). 4.  Patient will ambulate with independence for 300 feet with the least restrictive device within 7 day(s). 5.  Patient will ascend/descend 5 stairs with 1 handrail(s) with modified independence within 7 day(s). Outcome: Progressing Towards Goal     PHYSICAL THERAPY TREATMENT  Patient: Manda Negrete (11 y.o. female)  Date: 2/15/2021  Diagnosis: Aphasia [R47.01] <principal problem not specified>       Precautions:    Chart, physical therapy assessment, plan of care and goals were reviewed. ASSESSMENT  Patient continues with skilled PT services and is progressing towards goals. Pt received supine in bed and willing to work with therapy. Pt presents with slight confusion, but oriented x3. Pt showed progress from last session requiring less assistance with bed mobility. Pt was able to tolerate gait training up to 15' with RW with no LOB, but difficulties with direction changes, VC needed for RW placement. Pt completed the session supine in bed with HOB elevated to comfort with call bell within reach, bed alarm set and all needs met at the time. Rn notified of session.      Current Level of Function Impacting Discharge (mobility/balance): Min A for bed mobility, CGA for amb up to 150' RW     Other factors to consider for discharge: confusion, fall risk         PLAN :  Patient continues to benefit from skilled intervention to address the above impairments. Continue treatment per established plan of care. to address goals. Recommendation for discharge: (in order for the patient to meet his/her long term goals)  Physical therapy at least 2 days/week in the home AND ensure assist and/or supervision for safety with daily tasks. This discharge recommendation:  Has not yet been discussed the attending provider and/or case management    IF patient discharges home will need the following DME: to be determined (TBD)       SUBJECTIVE:   Patient stated the flowers are from the [employer].     OBJECTIVE DATA SUMMARY:   Critical Behavior:  Neurologic State: Alert  Orientation Level: Oriented to person, Oriented to place, Disoriented to situation, Disoriented to time  Cognition: Decreased command following, Decreased attention/concentration     Functional Mobility Training:  Bed Mobility:  Supine to Sit: Minimum assistance  Scooting: Contact guard assistance     Transfers:  Sit to Stand: Contact guard assistance  Stand to Sit: Contact guard assistance  Bed to Chair: Minimum assistance     Balance:  Sitting: Intact  Sitting - Static: Good (unsupported)  Sitting - Dynamic: Fair (occasional)  Standing: Impaired; Without support  Standing - Static: Fair  Standing - Dynamic : Fair    Ambulation/Gait Training:  Distance (ft): 150 Feet (ft)  Assistive Device: Gait belt;Walker, rolling  Ambulation - Level of Assistance: Contact guard assistance  Gait Abnormalities: Decreased step clearance  Base of Support: Narrowed  Step Length: Right shortened;Left shortened      Activity Tolerance:   Good    After treatment patient left in no apparent distress:   Supine in bed, Call bell within reach, and Bed / chair alarm activated    COMMUNICATION/COLLABORATION:   The patients plan of care was discussed with: Registered nurse.      Jillian Cartwright PTA   Time Calculation: 23 mins

## 2021-02-15 NOTE — PROGRESS NOTES
[address] with [facility],.    CM noted that [facility] has accepted this pt for home health.  CM placed this information on pt's AVS.LADAN Ray,ACM-SW

## 2021-02-15 NOTE — PROGRESS NOTES
SLP Contact Note    Consult received and appreciated. Patient chart reviewed. Pt passed STAND. MRI negative for acute infarct. No additional intervention indicated. Will sign-off.       Thank you,  LUIS M Sánchez.Ed, [id number] [facility]  Speech-Language Pathologist

## 2021-02-15 NOTE — PROGRESS NOTES
Reason for Admission:   Syncopal episode                   RUR Score:      10%               Plan for utilizing home health:    TBD      PCP: First and Last name: Keaton Cintron   Name of Practice:    Are you a current patient: Yes/No: yes   Approximate date of last visit: last month   Can you participate in a virtual visit with your PCP: no                    Current Advanced Directive/Advance Care Plan: Not on file. Healthcare Decision Maker: Niecy Simons-daughter  Click here to complete [address] including selection of the Healthcare Decision Maker Relationship (ie \"Primary\")                         Transition of Care Plan:     CM spoke with pt's daughter, Reva Finch, to introduce her to the CM role and transition of care. She stated that this pt lives with her in her home and that she has 24hr supervision. Pt has a cane, but was ambulating without an AD prior to admission. CM informed pt's daughter that the pt has  Home health orders and offered her freedom of choice. She would like to use [facility] for home health. Transition of Care Plan:     The Plan for Transition of Care is related to the following treatment goals: Home health    The Patient and/or patient representative (Niecy) was provided with a choice of provider and agrees  with the discharge plan. Yes [x] No []    A Freedom of choice list was provided with basic dialogue that supports the patient's individualized plan of care/goals and shares the quality data associated with the providers. Yes [x] No []                 CM sent a referral to [facility] via [employer]. [address] Management Interventions  PCP Verified by CM:  Yes  Palliative Care Criteria Met (RRAT>21 & CHF Dx)?: No  Transition of Care Consult (CM Consult): Discharge Planning  MyChart Signup: No  Discharge Durable Medical Equipment: No  Physical Therapy Consult: Yes  Occupational Therapy Consult: No  Speech Therapy Consult: No  Current Support Network: Relative's Home  Confirm Follow Up Transport: Family  The Plan for Transition of Care is Related to the Following Treatment Goals : safe d/c  The Patient and/or Patient Representative was Provided with a Choice of Provider and Agrees with the Discharge Plan?: Yes  Freedom of Choice List was Provided with Basic Dialogue that Supports the Patient's Individualized Plan of Care/Goals, Treatment Preferences and Shares the Quality Data Associated with the Providers?: Yes  The [employer] Information Provided?: No

## 2021-02-16 PROBLEM — I50.9 CHRONIC CHF (HCC): Chronic | Status: ACTIVE | Noted: 2021-02-16

## 2021-02-18 LAB
BACTERIA SPEC CULT: NORMAL
SERVICE CMNT-IMP: NORMAL

## 2022-03-19 PROBLEM — I50.9 CHRONIC CHF (HCC): Status: ACTIVE | Noted: 2021-02-16

## 2022-03-19 PROBLEM — R47.01 APHASIA: Status: ACTIVE | Noted: 2021-02-13

## 2022-03-31 ENCOUNTER — APPOINTMENT (OUTPATIENT)
Dept: CT IMAGING | Age: 87
DRG: 481 | End: 2022-03-31
Attending: EMERGENCY MEDICINE
Payer: MEDICARE

## 2022-03-31 ENCOUNTER — APPOINTMENT (OUTPATIENT)
Dept: GENERAL RADIOLOGY | Age: 87
DRG: 481 | End: 2022-03-31
Attending: EMERGENCY MEDICINE
Payer: MEDICARE

## 2022-03-31 ENCOUNTER — ANESTHESIA EVENT (OUTPATIENT)
Dept: SURGERY | Age: 87
DRG: 481 | End: 2022-03-31
Payer: MEDICARE

## 2022-03-31 ENCOUNTER — HOSPITAL ENCOUNTER (INPATIENT)
Age: 87
LOS: 7 days | Discharge: NURSING, REHAB OR LONG-TERM CARE | DRG: 481 | End: 2022-04-07
Attending: EMERGENCY MEDICINE | Admitting: STUDENT IN AN ORGANIZED HEALTH CARE EDUCATION/TRAINING PROGRAM
Payer: MEDICARE

## 2022-03-31 ENCOUNTER — ANESTHESIA (OUTPATIENT)
Dept: SURGERY | Age: 87
DRG: 481 | End: 2022-03-31
Payer: MEDICARE

## 2022-03-31 DIAGNOSIS — S72.002A CLOSED FRACTURE OF LEFT HIP, INITIAL ENCOUNTER (HCC): Primary | ICD-10-CM

## 2022-03-31 PROBLEM — S72.009A HIP FRACTURE (HCC): Status: ACTIVE | Noted: 2022-03-31

## 2022-03-31 LAB
APPEARANCE UR: CLEAR
ATRIAL RATE: 312 BPM
BACTERIA URNS QL MICRO: NEGATIVE /HPF
BASOPHILS # BLD: 0 K/UL (ref 0–0.1)
BASOPHILS NFR BLD: 0 % (ref 0–1)
BILIRUB UR QL: NEGATIVE
CALCULATED R AXIS, ECG10: -81 DEGREES
CALCULATED T AXIS, ECG11: 108 DEGREES
COLOR UR: ABNORMAL
COMMENT, HOLDF: NORMAL
COVID-19 RAPID TEST, COVR: NOT DETECTED
DIAGNOSIS, 93000: NORMAL
DIFFERENTIAL METHOD BLD: ABNORMAL
EOSINOPHIL # BLD: 0.1 K/UL (ref 0–0.4)
EOSINOPHIL NFR BLD: 1 % (ref 0–7)
EPITH CASTS URNS QL MICRO: ABNORMAL /LPF
ERYTHROCYTE [DISTWIDTH] IN BLOOD BY AUTOMATED COUNT: 14.6 % (ref 11.5–14.5)
GLUCOSE UR STRIP.AUTO-MCNC: NEGATIVE MG/DL
HCT VFR BLD AUTO: 42.6 % (ref 35–47)
HGB BLD-MCNC: 14.2 G/DL (ref 11.5–16)
HGB UR QL STRIP: NEGATIVE
HYALINE CASTS URNS QL MICRO: ABNORMAL /LPF (ref 0–5)
IMM GRANULOCYTES # BLD AUTO: 0 K/UL (ref 0–0.04)
IMM GRANULOCYTES NFR BLD AUTO: 0 % (ref 0–0.5)
INR PPP: 1.1 (ref 0.9–1.1)
KETONES UR QL STRIP.AUTO: NEGATIVE MG/DL
LEUKOCYTE ESTERASE UR QL STRIP.AUTO: ABNORMAL
LYMPHOCYTES # BLD: 2.1 K/UL (ref 0.8–3.5)
LYMPHOCYTES NFR BLD: 31 % (ref 12–49)
MCH RBC QN AUTO: 31 PG (ref 26–34)
MCHC RBC AUTO-ENTMCNC: 33.3 G/DL (ref 30–36.5)
MCV RBC AUTO: 93 FL (ref 80–99)
MONOCYTES # BLD: 0.5 K/UL (ref 0–1)
MONOCYTES NFR BLD: 7 % (ref 5–13)
NEUTS SEG # BLD: 4.1 K/UL (ref 1.8–8)
NEUTS SEG NFR BLD: 61 % (ref 32–75)
NITRITE UR QL STRIP.AUTO: NEGATIVE
NRBC # BLD: 0 K/UL (ref 0–0.01)
NRBC BLD-RTO: 0 PER 100 WBC
PH UR STRIP: 5.5 [PH] (ref 5–8)
PLATELET # BLD AUTO: 177 K/UL (ref 150–400)
PMV BLD AUTO: 11.6 FL (ref 8.9–12.9)
PROT UR STRIP-MCNC: 30 MG/DL
PROTHROMBIN TIME: 11 SEC (ref 9–11.1)
Q-T INTERVAL, ECG07: 406 MS
QRS DURATION, ECG06: 94 MS
QTC CALCULATION (BEZET), ECG08: 388 MS
RBC # BLD AUTO: 4.58 M/UL (ref 3.8–5.2)
RBC #/AREA URNS HPF: ABNORMAL /HPF (ref 0–5)
SAMPLES BEING HELD,HOLD: NORMAL
SOURCE, COVRS: NORMAL
SP GR UR REFRACTOMETRY: 1.02 (ref 1–1.03)
UR CULT HOLD, URHOLD: NORMAL
UROBILINOGEN UR QL STRIP.AUTO: 0.2 EU/DL (ref 0.2–1)
VENTRICULAR RATE, ECG03: 55 BPM
WBC # BLD AUTO: 6.9 K/UL (ref 3.6–11)
WBC URNS QL MICRO: ABNORMAL /HPF (ref 0–4)

## 2022-03-31 PROCEDURE — 74011250636 HC RX REV CODE- 250/636: Performed by: EMERGENCY MEDICINE

## 2022-03-31 PROCEDURE — 85025 COMPLETE CBC W/AUTO DIFF WBC: CPT

## 2022-03-31 PROCEDURE — 87635 SARS-COV-2 COVID-19 AMP PRB: CPT

## 2022-03-31 PROCEDURE — 71045 X-RAY EXAM CHEST 1 VIEW: CPT

## 2022-03-31 PROCEDURE — 99285 EMERGENCY DEPT VISIT HI MDM: CPT

## 2022-03-31 PROCEDURE — 96374 THER/PROPH/DIAG INJ IV PUSH: CPT

## 2022-03-31 PROCEDURE — 96375 TX/PRO/DX INJ NEW DRUG ADDON: CPT

## 2022-03-31 PROCEDURE — 73502 X-RAY EXAM HIP UNI 2-3 VIEWS: CPT

## 2022-03-31 PROCEDURE — 72192 CT PELVIS W/O DYE: CPT

## 2022-03-31 PROCEDURE — 65270000029 HC RM PRIVATE

## 2022-03-31 PROCEDURE — 81001 URINALYSIS AUTO W/SCOPE: CPT

## 2022-03-31 PROCEDURE — 85610 PROTHROMBIN TIME: CPT

## 2022-03-31 PROCEDURE — 93005 ELECTROCARDIOGRAM TRACING: CPT

## 2022-03-31 PROCEDURE — 80053 COMPREHEN METABOLIC PANEL: CPT

## 2022-03-31 RX ORDER — MORPHINE SULFATE 2 MG/ML
1 INJECTION, SOLUTION INTRAMUSCULAR; INTRAVENOUS ONCE
Status: COMPLETED | OUTPATIENT
Start: 2022-03-31 | End: 2022-03-31

## 2022-03-31 RX ORDER — ONDANSETRON 2 MG/ML
4 INJECTION INTRAMUSCULAR; INTRAVENOUS
Status: COMPLETED | OUTPATIENT
Start: 2022-03-31 | End: 2022-03-31

## 2022-03-31 RX ADMIN — MORPHINE SULFATE 1 MG: 2 INJECTION, SOLUTION INTRAMUSCULAR; INTRAVENOUS at 16:53

## 2022-03-31 RX ADMIN — ONDANSETRON 4 MG: 2 INJECTION INTRAMUSCULAR; INTRAVENOUS at 16:52

## 2022-03-31 NOTE — ED TRIAGE NOTES
Brought by EMS. Sustained GLF at home 3 days ago when she tripped over her dog. C/o L hip pain, rates 4/10. Unable to bear weight per caretaker.

## 2022-03-31 NOTE — ED PROVIDER NOTES
This is a 80-year-old female who apparently fell on Tuesday evening. The dogs tripped her up and she fell against a brick fireplace. Since then time she has been complaining of severe pain in her left hip and groin. The sitter says that when she walks around she just drags her left leg. She did not hit her head. She has not had any nausea or vomiting. There has been no chest pain, shortness of breath or other weakness or numbness noted. She has not had any chills or fever or other acute symptoms. No past medical history on file. No past surgical history on file. No family history on file. Social History     Socioeconomic History    Marital status:      Spouse name: Not on file    Number of children: Not on file    Years of education: Not on file    Highest education level: Not on file   Occupational History    Not on file   Tobacco Use    Smoking status: Not on file    Smokeless tobacco: Not on file   Substance and Sexual Activity    Alcohol use: Not on file    Drug use: Not on file    Sexual activity: Not on file   Other Topics Concern    Not on file   Social History Narrative    Not on file     Social Determinants of Health     Financial Resource Strain:     Difficulty of Paying Living Expenses: Not on file   Food Insecurity:     Worried About Running Out of Food in the Last Year: Not on file    Milena of Food in the Last Year: Not on file   Transportation Needs:     Lack of Transportation (Medical): Not on file    Lack of Transportation (Non-Medical):  Not on file   Physical Activity:     Days of Exercise per Week: Not on file    Minutes of Exercise per Session: Not on file   Stress:     Feeling of Stress : Not on file   Social Connections:     Frequency of Communication with Friends and Family: Not on file    Frequency of Social Gatherings with Friends and Family: Not on file    Attends Synagogue Services: Not on file   [employer] of Clubs or Organizations: Not on file    Attends Club or Organization Meetings: Not on file    Marital Status: Not on file   Intimate Partner Violence:     Fear of Current or Ex-Partner: Not on file    Emotionally Abused: Not on file    Physically Abused: Not on file    Sexually Abused: Not on file   Housing Stability:     Unable to Pay for Housing in the Last Year: Not on file    Number of [place] in the Last Year: Not on file    Unstable Housing in the Last Year: Not on file         ALLERGIES: Patient has no known allergies. Review of Systems   Constitutional: Negative for activity change, appetite change, chills, fatigue and fever. HENT: Negative for ear pain, facial swelling, sore throat and trouble swallowing. Eyes: Negative for pain, discharge and visual disturbance. Respiratory: Negative for chest tightness, shortness of breath and wheezing. Cardiovascular: Negative for chest pain and palpitations. Gastrointestinal: Negative for abdominal pain, blood in stool, nausea and vomiting. Genitourinary: Negative for difficulty urinating, flank pain and hematuria. Musculoskeletal: Negative for arthralgias, joint swelling, myalgias and neck pain. Severe pain left hip and groin   Skin: Negative for color change and rash. Neurological: Negative for dizziness, weakness, numbness and headaches. Hematological: Negative for adenopathy. Does not bruise/bleed easily. Psychiatric/Behavioral: Negative for behavioral problems, confusion and sleep disturbance. All other systems reviewed and are negative. Vitals:    03/31/22 1628   BP: 135/87   Pulse: (!) 59   Resp: 18   Temp: 98.1 °F (36.7 °C)   SpO2: 96%   Weight: 40.8 kg (90 lb)   Height: 5' 2\" (1.575 m)            Physical Exam  Vitals and nursing note reviewed. Constitutional:       General: She is in acute distress ( With left hip pain). Appearance: She is well-developed. She is not ill-appearing or diaphoretic.       Comments: This is a  80year-old patient who presents with severe pain in her left hip and groin by EMS. She is alert and oriented. Vital signs are as noted. HENT:      Head: Normocephalic and atraumatic. Nose: Nose normal.   Eyes:      General: No scleral icterus. Conjunctiva/sclera: Conjunctivae normal.      Pupils: Pupils are equal, round, and reactive to light. Neck:      Thyroid: No thyromegaly. Vascular: No JVD. Trachea: No tracheal deviation. Comments: No carotid bruits noted. Cardiovascular:      Rate and Rhythm: Normal rate and regular rhythm. Heart sounds: Normal heart sounds. No murmur heard. No friction rub. No gallop. Pulmonary:      Effort: Pulmonary effort is normal. No respiratory distress. Breath sounds: Normal breath sounds. No wheezing or rales. Chest:      Chest wall: No tenderness. Abdominal:      General: Bowel sounds are normal. There is no distension. Palpations: Abdomen is soft. There is no mass. Tenderness: There is no abdominal tenderness. There is no guarding or rebound. Musculoskeletal:         General: No tenderness. Normal range of motion. Cervical back: Normal range of motion and neck supple. Comments: As long as this patient's not moving around, she is not having significant pain. Any movement of the left hip produces severe pain. There is no foreshortening or rotation of the left lower extremity noted. Lymphadenopathy:      Cervical: No cervical adenopathy. Skin:     General: Skin is warm and dry. Capillary Refill: Capillary refill takes 2 to 3 seconds. Coloration: Skin is pale. Findings: No erythema or rash. Neurological:      General: No focal deficit present. Mental Status: She is alert and oriented to person, place, and time. Cranial Nerves: No cranial nerve deficit. Sensory: No sensory deficit. Motor: No weakness.       Coordination: Coordination normal.      Deep Tendon Reflexes: Reflexes are normal and symmetric. Psychiatric:         Mood and Affect: Mood normal.         Behavior: Behavior normal.         Thought Content: Thought content normal.         Judgment: Judgment normal.          MDM  Number of Diagnoses or Management Options  Closed fracture of left hip, initial encounter [facility]): new and requires workup     Amount and/or Complexity of Data Reviewed  Clinical lab tests: ordered and reviewed  Tests in the radiology section of CPT®: ordered and reviewed  Decide to obtain previous medical records or to obtain history from someone other than the patient: yes  Review and summarize past medical records: yes  Discuss the patient with other providers: yes  Independent visualization of images, tracings, or specimens: yes    Risk of Complications, Morbidity, and/or Mortality  Presenting problems: high  Diagnostic procedures: high  Management options: high    Patient Progress  Patient progress: stable         Procedures    This is a 77-year-old female who fell several days ago and presents with severe left hip pain. Labs have been drawn and she is medicated. Will obtain x-rays of the left hip and chest.  She is unable to move without severe pain. Will likely require admission    The x-ray of the left hip demonstrates a Garden type I fracture or subcapital fracture of the left hip. Patient does not have an orthopedic preference and a copy of the films have been sent to the Ortho PA. I do not have the call schedule. Have asked the Ortho PA to send me the name of the Ortho on-call. We will send them a copy of this photograph as well. Patient will require admission. I will consult the hospitalist for the same. Perfect Serve Consult for Admission  6:34 PM    ED Room Number: ER05/05  Patient Name and age:  Gwenevere Bumpers 80 y.o.  female  Working Diagnosis:   1.  Closed fracture of left hip, initial encounter ([facility] [address]        COVID-19 Suspicion:  no  Sepsis present:  no Reassessment needed: no  Code Status:  Full Code  Readmission: no  Isolation Requirements:  no  Recommended Level of Care:  med/surg  Department:[facility] Adult ED - 21   Other:  Ortho consulted

## 2022-03-31 NOTE — CONSULTS
ORTHOPEDIC CONSULT NOTE    Subjective:     Date of Consultation:  March 31, 2022  Referring Physician:  Priyank Chou MD    Curt Valentin is a 80 y.o. female who is being seen for left hip pain. The patient's daughter is at bedside. The patient was ambulating on Tuesday evening when she fell. Her daughter reports that she should have been using her walker, but she doesn't like to use it. The patient fell and had been dragging the left leg since the fall. Pain continued to worsen and now she is unable to place weight onto the left leg. She reports left hip pain worsened with left leg movement. The patient currently lives at home with her daughter and has a caregiver in her daughter's home 5 days a week. She denies distal paresthesias. Of note, she takes Eliquis 2.5mg daily, last taken this am. Last meal was fruit around noon. Patient Active Problem List    Diagnosis Date Noted    Chronic CHF ([facility] [address] 02/16/2021    Aphasia 02/13/2021       No family history on file. Social History     Tobacco Use    Smoking status: Not on file    Smokeless tobacco: Not on file   Substance Use Topics    Alcohol use: Not on file     No past medical history on file. No past surgical history on file. Prior to Admission medications    Medication Sig Start Date End Date Taking? Authorizing Provider   gabapentin (NEURONTIN) 100 mg capsule Take 1 Cap by mouth two (2) times a day. Max Daily Amount: 200 mg. 2/15/21   Braden Loya MD   atorvastatin (LIPITOR) 20 mg tablet Take 1 Tab by mouth nightly. 2/15/21   Braden Loya MD   aspirin delayed-release 81 mg tablet Take 1 Tab by mouth daily. 2/16/21   Braden Loya MD   apixaban (ELIQUIS) 2.5 mg tablet Take 1 Tab by mouth two (2) times a day. 2/15/21   Braden Loya MD   mirtazapine (REMERON) 7.5 mg tablet Take 1 Tab by mouth nightly. 2/15/21   Braden Loya MD   furosemide (LASIX) 20 mg tablet Take 1 Tab by mouth daily.  2/15/21   Braden Loya MD     No current facility-administered medications for this encounter. Current Outpatient Medications   Medication Sig    gabapentin (NEURONTIN) 100 mg capsule Take 1 Cap by mouth two (2) times a day. Max Daily Amount: 200 mg.    atorvastatin (LIPITOR) 20 mg tablet Take 1 Tab by mouth nightly.  aspirin delayed-release 81 mg tablet Take 1 Tab by mouth daily.  apixaban (ELIQUIS) 2.5 mg tablet Take 1 Tab by mouth two (2) times a day.  mirtazapine (REMERON) 7.5 mg tablet Take 1 Tab by mouth nightly.  furosemide (LASIX) 20 mg tablet Take 1 Tab by mouth daily. No Known Allergies     Review of Systems:  A comprehensive review of systems was negative except for that written in the HPI. Objective:     Patient Vitals for the past 8 hrs:   BP Temp Pulse Resp SpO2 Height Weight   22 1838 -- -- -- -- 93 % -- --   22 1828 (!) 140/77 -- -- -- 92 % -- --   22 1758 126/77 -- -- -- 90 % -- --   22 1733 117/70 -- -- -- 94 % -- --   22 1658 106/64 -- -- -- (!) 83 % -- --   22 1628 135/87 98.1 °F (36.7 °C) (!) 59 18 96 % 5' 2\" (1.575 m) 40.8 kg (90 lb)     Temp (24hrs), Av.1 °F (36.7 °C), Min:98.1 °F (36.7 °C), Max:98.1 °F (36.7 °C)        ORTHO EXAM: alert, cooperative, no distress, appears stated age  LLE MSK: Left hip without abrasions. Bony tenderness to the lateral hip. No obvious deformity at the hip noted. Left leg is shortened with external rotation. Sensation to light touch intact to the medial/lat/posterior aspects of the foot. Compartments of the thigh and calf are soft and compressible, without pain. Able to wiggle toes. Passive flexion of great toe without pain. DF/PF intact. Pain with minimal passive internal/external rotation of the hip. 1+ DP pulses. Cap refill brisk.     IMAGING REVIEW:  EXAM: XR HIP LT W OR WO PELV 2-3 VWS     INDICATION: Trauma.     COMPARISON: None.     FINDINGS: AP view of the pelvis and a true lateral view of the left hip  demonstrate an incomplete fracture of the subcapital left femur with mild valgus  of the femoral head. No dislocation or other fracture is shown. The bones are  severely osteopenic. There is mild-moderate bilateral hip osteoarthrosis. Degenerative findings are also noted in the SI joints and lower lumbar spine.     IMPRESSION  Findings consistent with Garden 1 fracture of left femur. INDICATION: Rule out fx/LT hip pain     COMPARISON: Earlier radiographs     EXAM: Axial CT imaging of the bony pelvis with coronal and sagittal  reformations. CT dose reduction was achieved through use of a standardized  protocol tailored for this examination and automatic exposure control for dose  modulation.     FINDINGS: The bones are severely osteopenic. A nonspecific oval-shaped  osteolytic focus is demonstrated in the left posterior iliac bone measuring 13  mm in size. An incomplete fracture of the subcapital left femoral neck is again  shown with mild valgus of the femoral head, representative of a Garden 1  fracture.     There is mild bilateral hip and SI joint osteoarthrosis. Chondrocalcinosis is  shown in the hips, SI joints and symphysis pubis.     There are small bilateral hip effusions. There is no soft tissue mass or  collection. Muscles appear normal in size and attenuation. Abundant  atherosclerotic calcifications are noted. There is mild lower lumbar  spondylosis.     IMPRESSION  1. Severe diffuse osteopenia. 13 mm lytic focus in left posterior iliac bone of  undetermined significance. 2. Garden 1 fracture of left femur. 3. Chondrocalcinosis and degenerative findings as above    Labs:   Recent Results (from the past 24 hour(s))   SAMPLES BEING HELD    Collection Time: 03/31/22  4:38 PM   Result Value Ref Range    SAMPLES BEING HELD 1PST,1RED,1LAV,1BLU     COMMENT        Add-on orders for these samples will be processed based on acceptable specimen integrity and analyte stability, which may vary by analyte.    EKG, 12 LEAD, INITIAL Collection Time: 03/31/22  6:37 PM   Result Value Ref Range    Ventricular Rate 55 BPM    Atrial Rate 312 BPM    QRS Duration 94 ms    Q-T Interval 406 ms    QTC Calculation (Bezet) 388 ms    Calculated R Axis -81 degrees    Calculated T Axis 108 degrees    Diagnosis       Undetermined rhythm  Left axis deviation  Minimal voltage criteria for LVH, may be normal variant ( [place] product )  Inferior infarct , age undetermined  Anteroseptal infarct (cited on or before 13-FEB-2021)  T wave abnormality, consider lateral ischemia  Abnormal ECG  When compared with ECG of 13-FEB-2021 13:11,  Current undetermined rhythm precludes rhythm comparison, needs review  T wave inversion now evident in Anterior leads  QT has shortened           Impression:     Patient Active Problem List    Diagnosis Date Noted    [facility] 02/16/2021    Aphasia 02/13/2021       Active Problems:    * No active hospital problems. *        ASSESSMENT:   Incomplete left subcapital femur fracture    Plan:   ORTHOPEDIC PLAN:  1. D/w Dr. Lula Meraz  2. Ortho plan: Plan to take the pt to the OR [facility], 3/31 for percutaneous pinning of the left hip. She will need to be cleared by the medical team. NPO now. 3. DVT ppx: Hold Eliquis for surgery. 4. Pain control: per medical team  5.  Activity: Bedrest for now      [place], [address]

## 2022-04-01 ENCOUNTER — APPOINTMENT (OUTPATIENT)
Dept: GENERAL RADIOLOGY | Age: 87
DRG: 481 | End: 2022-04-01
Attending: STUDENT IN AN ORGANIZED HEALTH CARE EDUCATION/TRAINING PROGRAM
Payer: MEDICARE

## 2022-04-01 PROBLEM — E78.5 HYPERLIPIDEMIA: Status: ACTIVE | Noted: 2022-04-01

## 2022-04-01 PROBLEM — I50.42 CHRONIC COMBINED SYSTOLIC AND DIASTOLIC HEART FAILURE (HCC): Status: ACTIVE | Noted: 2022-04-01

## 2022-04-01 PROBLEM — I10 BENIGN ESSENTIAL HYPERTENSION: Status: ACTIVE | Noted: 2022-04-01

## 2022-04-01 PROBLEM — I25.10 ATHEROSCLEROTIC HEART DISEASE OF NATIVE CORONARY ARTERY WITHOUT ANGINA PECTORIS: Status: ACTIVE | Noted: 2022-04-01

## 2022-04-01 PROBLEM — N18.32 STAGE 3B CHRONIC KIDNEY DISEASE (HCC): Status: ACTIVE | Noted: 2022-04-01

## 2022-04-01 PROBLEM — I48.20 CHRONIC A-FIB (HCC): Status: ACTIVE | Noted: 2022-04-01

## 2022-04-01 LAB
ALBUMIN SERPL-MCNC: 3.8 G/DL (ref 3.5–5)
ALBUMIN/GLOB SERPL: 1 {RATIO} (ref 1.1–2.2)
ALP SERPL-CCNC: 118 U/L (ref 45–117)
ALT SERPL-CCNC: 44 U/L (ref 12–78)
ANION GAP SERPL CALC-SCNC: 10 MMOL/L (ref 5–15)
AST SERPL-CCNC: 31 U/L (ref 15–37)
BILIRUB SERPL-MCNC: 0.9 MG/DL (ref 0.2–1)
BUN SERPL-MCNC: 28 MG/DL (ref 6–20)
BUN/CREAT SERPL: 17 (ref 12–20)
CALCIUM SERPL-MCNC: 9.3 MG/DL (ref 8.5–10.1)
CHLORIDE SERPL-SCNC: 104 MMOL/L (ref 97–108)
CO2 SERPL-SCNC: 23 MMOL/L (ref 21–32)
CREAT SERPL-MCNC: 1.64 MG/DL (ref 0.55–1.02)
GLOBULIN SER CALC-MCNC: 3.7 G/DL (ref 2–4)
GLUCOSE SERPL-MCNC: 91 MG/DL (ref 65–100)
POTASSIUM SERPL-SCNC: 3.2 MMOL/L (ref 3.5–5.1)
PROT SERPL-MCNC: 7.5 G/DL (ref 6.4–8.2)
SODIUM SERPL-SCNC: 137 MMOL/L (ref 136–145)

## 2022-04-01 PROCEDURE — C1713 ANCHOR/SCREW BN/BN,TIS/BN: HCPCS | Performed by: STUDENT IN AN ORGANIZED HEALTH CARE EDUCATION/TRAINING PROGRAM

## 2022-04-01 PROCEDURE — 65270000029 HC RM PRIVATE

## 2022-04-01 PROCEDURE — 27235 TREAT THIGH FRACTURE: CPT | Performed by: STUDENT IN AN ORGANIZED HEALTH CARE EDUCATION/TRAINING PROGRAM

## 2022-04-01 PROCEDURE — 74011250637 HC RX REV CODE- 250/637: Performed by: STUDENT IN AN ORGANIZED HEALTH CARE EDUCATION/TRAINING PROGRAM

## 2022-04-01 PROCEDURE — 76010000149 HC OR TIME 1 TO 1.5 HR: Performed by: STUDENT IN AN ORGANIZED HEALTH CARE EDUCATION/TRAINING PROGRAM

## 2022-04-01 PROCEDURE — 77030036660

## 2022-04-01 PROCEDURE — 77030020788: Performed by: STUDENT IN AN ORGANIZED HEALTH CARE EDUCATION/TRAINING PROGRAM

## 2022-04-01 PROCEDURE — 74011250637 HC RX REV CODE- 250/637: Performed by: ANESTHESIOLOGY

## 2022-04-01 PROCEDURE — 76060000033 HC ANESTHESIA 1 TO 1.5 HR: Performed by: STUDENT IN AN ORGANIZED HEALTH CARE EDUCATION/TRAINING PROGRAM

## 2022-04-01 PROCEDURE — 74011000250 HC RX REV CODE- 250: Performed by: NURSE ANESTHETIST, CERTIFIED REGISTERED

## 2022-04-01 PROCEDURE — 74011250636 HC RX REV CODE- 250/636: Performed by: ANESTHESIOLOGY

## 2022-04-01 PROCEDURE — 2709999900 HC NON-CHARGEABLE SUPPLY: Performed by: STUDENT IN AN ORGANIZED HEALTH CARE EDUCATION/TRAINING PROGRAM

## 2022-04-01 PROCEDURE — 74011250636 HC RX REV CODE- 250/636: Performed by: NURSE ANESTHETIST, CERTIFIED REGISTERED

## 2022-04-01 PROCEDURE — 77030042556 HC PNCL CAUT -B: Performed by: STUDENT IN AN ORGANIZED HEALTH CARE EDUCATION/TRAINING PROGRAM

## 2022-04-01 PROCEDURE — 97530 THERAPEUTIC ACTIVITIES: CPT

## 2022-04-01 PROCEDURE — 74011250636 HC RX REV CODE- 250/636: Performed by: STUDENT IN AN ORGANIZED HEALTH CARE EDUCATION/TRAINING PROGRAM

## 2022-04-01 PROCEDURE — 77030010507 HC ADH SKN DERMBND J&J -B: Performed by: STUDENT IN AN ORGANIZED HEALTH CARE EDUCATION/TRAINING PROGRAM

## 2022-04-01 PROCEDURE — 74011250636 HC RX REV CODE- 250/636: Performed by: PHYSICIAN ASSISTANT

## 2022-04-01 PROCEDURE — 77030008684 HC TU ET CUF COVD -B: Performed by: NURSE ANESTHETIST, CERTIFIED REGISTERED

## 2022-04-01 PROCEDURE — 77030031139 HC SUT VCRL2 J&J -A: Performed by: STUDENT IN AN ORGANIZED HEALTH CARE EDUCATION/TRAINING PROGRAM

## 2022-04-01 PROCEDURE — 77030008462 HC STPLR SKN PROX J&J -A: Performed by: STUDENT IN AN ORGANIZED HEALTH CARE EDUCATION/TRAINING PROGRAM

## 2022-04-01 PROCEDURE — 2709999900 HC NON-CHARGEABLE SUPPLY

## 2022-04-01 PROCEDURE — 97162 PT EVAL MOD COMPLEX 30 MIN: CPT

## 2022-04-01 PROCEDURE — 0QH736Z INSERTION OF INTRAMEDULLARY INTERNAL FIXATION DEVICE INTO LEFT UPPER FEMUR, PERCUTANEOUS APPROACH: ICD-10-PCS | Performed by: STUDENT IN AN ORGANIZED HEALTH CARE EDUCATION/TRAINING PROGRAM

## 2022-04-01 PROCEDURE — 77030026438 HC STYL ET INTUB CARD -A: Performed by: NURSE ANESTHETIST, CERTIFIED REGISTERED

## 2022-04-01 PROCEDURE — 77030038552 HC DRN WND MDII -A: Performed by: STUDENT IN AN ORGANIZED HEALTH CARE EDUCATION/TRAINING PROGRAM

## 2022-04-01 PROCEDURE — 74011250637 HC RX REV CODE- 250/637

## 2022-04-01 PROCEDURE — 76210000016 HC OR PH I REC 1 TO 1.5 HR: Performed by: STUDENT IN AN ORGANIZED HEALTH CARE EDUCATION/TRAINING PROGRAM

## 2022-04-01 PROCEDURE — 73501 X-RAY EXAM HIP UNI 1 VIEW: CPT

## 2022-04-01 PROCEDURE — 74011000250 HC RX REV CODE- 250: Performed by: PHYSICIAN ASSISTANT

## 2022-04-01 PROCEDURE — 77030019908 HC STETH ESOPH SIMS -A: Performed by: NURSE ANESTHETIST, CERTIFIED REGISTERED

## 2022-04-01 PROCEDURE — 74011000250 HC RX REV CODE- 250: Performed by: STUDENT IN AN ORGANIZED HEALTH CARE EDUCATION/TRAINING PROGRAM

## 2022-04-01 DEVICE — SCREW BNE L80MM DIA7.3MM THRD L16MM CANC S STL SELF DRL ST: Type: IMPLANTABLE DEVICE | Site: HIP | Status: FUNCTIONAL

## 2022-04-01 DEVICE — SCREW BNE L90MM DIA7.3MM THRD L16MM CANC S STL SELF DRL ST: Type: IMPLANTABLE DEVICE | Site: HIP | Status: FUNCTIONAL

## 2022-04-01 DEVICE — SCREW BNE L75MM DIA7.3MM THRD L16MM CANC S STL ST SELF DRL: Type: IMPLANTABLE DEVICE | Site: HIP | Status: FUNCTIONAL

## 2022-04-01 RX ORDER — ROPIVACAINE HYDROCHLORIDE 5 MG/ML
30 INJECTION, SOLUTION EPIDURAL; INFILTRATION; PERINEURAL AS NEEDED
Status: DISCONTINUED | OUTPATIENT
Start: 2022-04-01 | End: 2022-04-05

## 2022-04-01 RX ORDER — HYDROMORPHONE HYDROCHLORIDE 1 MG/ML
0.2 INJECTION, SOLUTION INTRAMUSCULAR; INTRAVENOUS; SUBCUTANEOUS
Status: DISCONTINUED | OUTPATIENT
Start: 2022-04-01 | End: 2022-04-05

## 2022-04-01 RX ORDER — FENTANYL CITRATE 50 UG/ML
50 INJECTION, SOLUTION INTRAMUSCULAR; INTRAVENOUS AS NEEDED
Status: DISCONTINUED | OUTPATIENT
Start: 2022-04-01 | End: 2022-04-02

## 2022-04-01 RX ORDER — DEXAMETHASONE SODIUM PHOSPHATE 4 MG/ML
INJECTION, SOLUTION INTRA-ARTICULAR; INTRALESIONAL; INTRAMUSCULAR; INTRAVENOUS; SOFT TISSUE AS NEEDED
Status: DISCONTINUED | OUTPATIENT
Start: 2022-04-01 | End: 2022-04-01 | Stop reason: HOSPADM

## 2022-04-01 RX ORDER — FENTANYL CITRATE 50 UG/ML
25 INJECTION, SOLUTION INTRAMUSCULAR; INTRAVENOUS
Status: COMPLETED | OUTPATIENT
Start: 2022-04-01 | End: 2022-04-01

## 2022-04-01 RX ORDER — POLYETHYLENE GLYCOL 3350 17 G/17G
17 POWDER, FOR SOLUTION ORAL DAILY
Status: DISCONTINUED | OUTPATIENT
Start: 2022-04-01 | End: 2022-04-07 | Stop reason: HOSPADM

## 2022-04-01 RX ORDER — AMOXICILLIN 250 MG
1 CAPSULE ORAL 2 TIMES DAILY
Status: DISCONTINUED | OUTPATIENT
Start: 2022-04-01 | End: 2022-04-07 | Stop reason: HOSPADM

## 2022-04-01 RX ORDER — SODIUM CHLORIDE 0.9 % (FLUSH) 0.9 %
5-40 SYRINGE (ML) INJECTION EVERY 8 HOURS
Status: DISCONTINUED | OUTPATIENT
Start: 2022-04-01 | End: 2022-04-07 | Stop reason: HOSPADM

## 2022-04-01 RX ORDER — MIDAZOLAM HYDROCHLORIDE 1 MG/ML
1 INJECTION, SOLUTION INTRAMUSCULAR; INTRAVENOUS AS NEEDED
Status: DISCONTINUED | OUTPATIENT
Start: 2022-04-01 | End: 2022-04-02

## 2022-04-01 RX ORDER — GABAPENTIN 100 MG/1
100 CAPSULE ORAL 2 TIMES DAILY
Status: DISCONTINUED | OUTPATIENT
Start: 2022-04-01 | End: 2022-04-07 | Stop reason: HOSPADM

## 2022-04-01 RX ORDER — MIDAZOLAM HYDROCHLORIDE 1 MG/ML
0.5 INJECTION, SOLUTION INTRAMUSCULAR; INTRAVENOUS
Status: DISCONTINUED | OUTPATIENT
Start: 2022-04-01 | End: 2022-04-02

## 2022-04-01 RX ORDER — SODIUM CHLORIDE, SODIUM LACTATE, POTASSIUM CHLORIDE, CALCIUM CHLORIDE 600; 310; 30; 20 MG/100ML; MG/100ML; MG/100ML; MG/100ML
1000 INJECTION, SOLUTION INTRAVENOUS CONTINUOUS
Status: DISCONTINUED | OUTPATIENT
Start: 2022-04-01 | End: 2022-04-01

## 2022-04-01 RX ORDER — SUCCINYLCHOLINE CHLORIDE 20 MG/ML
INJECTION INTRAMUSCULAR; INTRAVENOUS AS NEEDED
Status: DISCONTINUED | OUTPATIENT
Start: 2022-04-01 | End: 2022-04-01 | Stop reason: HOSPADM

## 2022-04-01 RX ORDER — MORPHINE SULFATE 2 MG/ML
2 INJECTION, SOLUTION INTRAMUSCULAR; INTRAVENOUS
Status: DISCONTINUED | OUTPATIENT
Start: 2022-04-01 | End: 2022-04-02

## 2022-04-01 RX ORDER — HYDROCODONE BITARTRATE AND ACETAMINOPHEN 5; 325 MG/1; MG/1
1 TABLET ORAL AS NEEDED
Status: DISCONTINUED | OUTPATIENT
Start: 2022-04-01 | End: 2022-04-02

## 2022-04-01 RX ORDER — EPHEDRINE SULFATE/0.9% NACL/PF 50 MG/5 ML
SYRINGE (ML) INTRAVENOUS AS NEEDED
Status: DISCONTINUED | OUTPATIENT
Start: 2022-04-01 | End: 2022-04-01 | Stop reason: HOSPADM

## 2022-04-01 RX ORDER — DIPHENHYDRAMINE HYDROCHLORIDE 50 MG/ML
12.5 INJECTION, SOLUTION INTRAMUSCULAR; INTRAVENOUS AS NEEDED
Status: ACTIVE | OUTPATIENT
Start: 2022-04-01 | End: 2022-04-01

## 2022-04-01 RX ORDER — FACIAL-BODY WIPES
10 EACH TOPICAL DAILY PRN
Status: DISCONTINUED | OUTPATIENT
Start: 2022-04-03 | End: 2022-04-07 | Stop reason: HOSPADM

## 2022-04-01 RX ORDER — FENTANYL CITRATE 50 UG/ML
INJECTION, SOLUTION INTRAMUSCULAR; INTRAVENOUS AS NEEDED
Status: DISCONTINUED | OUTPATIENT
Start: 2022-04-01 | End: 2022-04-01 | Stop reason: HOSPADM

## 2022-04-01 RX ORDER — FERROUS SULFATE, DRIED 160(50) MG
1 TABLET, EXTENDED RELEASE ORAL
Status: DISCONTINUED | OUTPATIENT
Start: 2022-04-02 | End: 2022-04-07 | Stop reason: HOSPADM

## 2022-04-01 RX ORDER — ONDANSETRON 2 MG/ML
4 INJECTION INTRAMUSCULAR; INTRAVENOUS AS NEEDED
Status: DISCONTINUED | OUTPATIENT
Start: 2022-04-01 | End: 2022-04-07 | Stop reason: HOSPADM

## 2022-04-01 RX ORDER — ONDANSETRON 2 MG/ML
INJECTION INTRAMUSCULAR; INTRAVENOUS AS NEEDED
Status: DISCONTINUED | OUTPATIENT
Start: 2022-04-01 | End: 2022-04-01 | Stop reason: HOSPADM

## 2022-04-01 RX ORDER — ASPIRIN 81 MG/1
81 TABLET ORAL DAILY
Status: DISCONTINUED | OUTPATIENT
Start: 2022-04-01 | End: 2022-04-07 | Stop reason: HOSPADM

## 2022-04-01 RX ORDER — NALOXONE HYDROCHLORIDE 0.4 MG/ML
0.4 INJECTION, SOLUTION INTRAMUSCULAR; INTRAVENOUS; SUBCUTANEOUS AS NEEDED
Status: DISCONTINUED | OUTPATIENT
Start: 2022-04-01 | End: 2022-04-07 | Stop reason: HOSPADM

## 2022-04-01 RX ORDER — LIDOCAINE HYDROCHLORIDE 20 MG/ML
INJECTION, SOLUTION EPIDURAL; INFILTRATION; INTRACAUDAL; PERINEURAL AS NEEDED
Status: DISCONTINUED | OUTPATIENT
Start: 2022-04-01 | End: 2022-04-01 | Stop reason: HOSPADM

## 2022-04-01 RX ORDER — ALBUTEROL SULFATE 0.83 MG/ML
2.5 SOLUTION RESPIRATORY (INHALATION) AS NEEDED
Status: DISCONTINUED | OUTPATIENT
Start: 2022-04-01 | End: 2022-04-07 | Stop reason: HOSPADM

## 2022-04-01 RX ORDER — TRAMADOL HYDROCHLORIDE 50 MG/1
50 TABLET ORAL
Status: DISCONTINUED | OUTPATIENT
Start: 2022-04-01 | End: 2022-04-07 | Stop reason: HOSPADM

## 2022-04-01 RX ORDER — ACETAMINOPHEN 325 MG/1
650 TABLET ORAL ONCE
Status: DISPENSED | OUTPATIENT
Start: 2022-04-01 | End: 2022-04-01

## 2022-04-01 RX ORDER — MIRTAZAPINE 15 MG/1
7.5 TABLET, FILM COATED ORAL
Status: DISCONTINUED | OUTPATIENT
Start: 2022-04-01 | End: 2022-04-07 | Stop reason: HOSPADM

## 2022-04-01 RX ORDER — ATORVASTATIN CALCIUM 10 MG/1
20 TABLET, FILM COATED ORAL
Status: DISCONTINUED | OUTPATIENT
Start: 2022-04-01 | End: 2022-04-07 | Stop reason: HOSPADM

## 2022-04-01 RX ORDER — SODIUM CHLORIDE 9 MG/ML
125 INJECTION, SOLUTION INTRAVENOUS CONTINUOUS
Status: DISCONTINUED | OUTPATIENT
Start: 2022-04-01 | End: 2022-04-01

## 2022-04-01 RX ORDER — OXYCODONE HYDROCHLORIDE 5 MG/1
5 TABLET ORAL
Status: DISCONTINUED | OUTPATIENT
Start: 2022-04-01 | End: 2022-04-05

## 2022-04-01 RX ORDER — PROPOFOL 10 MG/ML
INJECTION, EMULSION INTRAVENOUS AS NEEDED
Status: DISCONTINUED | OUTPATIENT
Start: 2022-04-01 | End: 2022-04-01 | Stop reason: HOSPADM

## 2022-04-01 RX ORDER — LIDOCAINE HYDROCHLORIDE 10 MG/ML
0.1 INJECTION, SOLUTION EPIDURAL; INFILTRATION; INTRACAUDAL; PERINEURAL AS NEEDED
Status: DISCONTINUED | OUTPATIENT
Start: 2022-04-01 | End: 2022-04-05

## 2022-04-01 RX ORDER — FUROSEMIDE 20 MG/1
20 TABLET ORAL DAILY
Status: DISCONTINUED | OUTPATIENT
Start: 2022-04-01 | End: 2022-04-07 | Stop reason: HOSPADM

## 2022-04-01 RX ORDER — SODIUM CHLORIDE 9 MG/ML
25 INJECTION, SOLUTION INTRAVENOUS CONTINUOUS
Status: DISCONTINUED | OUTPATIENT
Start: 2022-04-01 | End: 2022-04-01

## 2022-04-01 RX ORDER — SODIUM CHLORIDE 0.9 % (FLUSH) 0.9 %
5-40 SYRINGE (ML) INJECTION AS NEEDED
Status: DISCONTINUED | OUTPATIENT
Start: 2022-04-01 | End: 2022-04-07 | Stop reason: HOSPADM

## 2022-04-01 RX ORDER — GLYCOPYRROLATE 0.2 MG/ML
0.2 INJECTION INTRAMUSCULAR; INTRAVENOUS
Status: DISPENSED | OUTPATIENT
Start: 2022-04-01 | End: 2022-04-02

## 2022-04-01 RX ADMIN — ATORVASTATIN CALCIUM 20 MG: 10 TABLET, FILM COATED ORAL at 07:30

## 2022-04-01 RX ADMIN — Medication 5 MG: at 02:39

## 2022-04-01 RX ADMIN — FENTANYL CITRATE 10 MCG: 50 INJECTION, SOLUTION INTRAMUSCULAR; INTRAVENOUS at 03:17

## 2022-04-01 RX ADMIN — LIDOCAINE HYDROCHLORIDE 20 MG: 20 INJECTION, SOLUTION EPIDURAL; INFILTRATION; INTRACAUDAL; PERINEURAL at 02:37

## 2022-04-01 RX ADMIN — MIRTAZAPINE 7.5 MG: 15 TABLET ORAL at 07:30

## 2022-04-01 RX ADMIN — FENTANYL CITRATE 10 MCG: 50 INJECTION, SOLUTION INTRAMUSCULAR; INTRAVENOUS at 03:45

## 2022-04-01 RX ADMIN — SENNOSIDES AND DOCUSATE SODIUM 1 TABLET: 50; 8.6 TABLET ORAL at 18:23

## 2022-04-01 RX ADMIN — FENTANYL CITRATE 20 MCG: 50 INJECTION, SOLUTION INTRAMUSCULAR; INTRAVENOUS at 03:49

## 2022-04-01 RX ADMIN — TRAMADOL HYDROCHLORIDE 50 MG: 50 TABLET, COATED ORAL at 16:19

## 2022-04-01 RX ADMIN — ONDANSETRON HYDROCHLORIDE 4 MG: 2 INJECTION, SOLUTION INTRAMUSCULAR; INTRAVENOUS at 03:27

## 2022-04-01 RX ADMIN — ATORVASTATIN CALCIUM 20 MG: 10 TABLET, FILM COATED ORAL at 23:17

## 2022-04-01 RX ADMIN — FENTANYL CITRATE 25 MCG: 50 INJECTION, SOLUTION INTRAMUSCULAR; INTRAVENOUS at 04:20

## 2022-04-01 RX ADMIN — SUCCINYLCHOLINE CHLORIDE 100 MG: 20 INJECTION, SOLUTION INTRAMUSCULAR; INTRAVENOUS at 02:37

## 2022-04-01 RX ADMIN — DEXAMETHASONE SODIUM PHOSPHATE 4 MG: 4 INJECTION, SOLUTION INTRAMUSCULAR; INTRAVENOUS at 02:46

## 2022-04-01 RX ADMIN — PROPOFOL 25 MG: 10 INJECTION, EMULSION INTRAVENOUS at 02:35

## 2022-04-01 RX ADMIN — FUROSEMIDE 20 MG: 20 TABLET ORAL at 09:29

## 2022-04-01 RX ADMIN — GABAPENTIN 100 MG: 100 CAPSULE ORAL at 18:23

## 2022-04-01 RX ADMIN — FENTANYL CITRATE 10 MCG: 50 INJECTION, SOLUTION INTRAMUSCULAR; INTRAVENOUS at 03:05

## 2022-04-01 RX ADMIN — TRAMADOL HYDROCHLORIDE 50 MG: 50 TABLET, COATED ORAL at 23:25

## 2022-04-01 RX ADMIN — ACETAMINOPHEN ORAL SOLUTION 650 MG: 650 SOLUTION ORAL at 07:37

## 2022-04-01 RX ADMIN — FENTANYL CITRATE 10 MCG: 50 INJECTION, SOLUTION INTRAMUSCULAR; INTRAVENOUS at 03:55

## 2022-04-01 RX ADMIN — PROPOFOL 25 MG: 10 INJECTION, EMULSION INTRAVENOUS at 02:37

## 2022-04-01 RX ADMIN — SODIUM CHLORIDE, POTASSIUM CHLORIDE, SODIUM LACTATE AND CALCIUM CHLORIDE: 600; 310; 30; 20 INJECTION, SOLUTION INTRAVENOUS at 02:26

## 2022-04-01 RX ADMIN — FENTANYL CITRATE 25 MCG: 50 INJECTION, SOLUTION INTRAMUSCULAR; INTRAVENOUS at 04:33

## 2022-04-01 RX ADMIN — FENTANYL CITRATE 10 MCG: 50 INJECTION, SOLUTION INTRAMUSCULAR; INTRAVENOUS at 03:41

## 2022-04-01 RX ADMIN — CEFAZOLIN SODIUM 2 G: 1 INJECTION, POWDER, FOR SOLUTION INTRAMUSCULAR; INTRAVENOUS at 14:13

## 2022-04-01 RX ADMIN — CEFAZOLIN SODIUM 2 G: 1 INJECTION, POWDER, FOR SOLUTION INTRAMUSCULAR; INTRAVENOUS at 07:30

## 2022-04-01 RX ADMIN — ASPIRIN 81 MG: 81 TABLET, COATED ORAL at 09:29

## 2022-04-01 RX ADMIN — SODIUM CHLORIDE 125 ML/HR: 9 INJECTION, SOLUTION INTRAVENOUS at 06:43

## 2022-04-01 RX ADMIN — SODIUM CHLORIDE, PRESERVATIVE FREE 10 ML: 5 INJECTION INTRAVENOUS at 07:32

## 2022-04-01 RX ADMIN — TRAMADOL HYDROCHLORIDE 50 MG: 50 TABLET, COATED ORAL at 09:29

## 2022-04-01 RX ADMIN — PHENYLEPHRINE HYDROCHLORIDE 40 MCG/MIN: 10 INJECTION INTRAVENOUS at 02:35

## 2022-04-01 RX ADMIN — PROPOFOL 25 MG: 10 INJECTION, EMULSION INTRAVENOUS at 02:38

## 2022-04-01 RX ADMIN — SENNOSIDES AND DOCUSATE SODIUM 1 TABLET: 50; 8.6 TABLET ORAL at 09:29

## 2022-04-01 RX ADMIN — FENTANYL CITRATE 25 MCG: 50 INJECTION, SOLUTION INTRAMUSCULAR; INTRAVENOUS at 04:40

## 2022-04-01 RX ADMIN — FENTANYL CITRATE 20 MCG: 50 INJECTION, SOLUTION INTRAMUSCULAR; INTRAVENOUS at 03:51

## 2022-04-01 RX ADMIN — FENTANYL CITRATE 10 MCG: 50 INJECTION, SOLUTION INTRAMUSCULAR; INTRAVENOUS at 02:35

## 2022-04-01 RX ADMIN — ACETAMINOPHEN ORAL SOLUTION 650 MG: 650 SOLUTION ORAL at 02:13

## 2022-04-01 RX ADMIN — WATER 2 G: 1 INJECTION INTRAMUSCULAR; INTRAVENOUS; SUBCUTANEOUS at 02:45

## 2022-04-01 RX ADMIN — POLYETHYLENE GLYCOL 3350 17 G: 17 POWDER, FOR SOLUTION ORAL at 09:29

## 2022-04-01 RX ADMIN — MIRTAZAPINE 7.5 MG: 15 TABLET ORAL at 23:17

## 2022-04-01 RX ADMIN — GABAPENTIN 100 MG: 100 CAPSULE ORAL at 09:29

## 2022-04-01 RX ADMIN — HYDROMORPHONE HYDROCHLORIDE 0.2 MG: 1 INJECTION, SOLUTION INTRAMUSCULAR; INTRAVENOUS; SUBCUTANEOUS at 06:41

## 2022-04-01 RX ADMIN — FENTANYL CITRATE 25 MCG: 50 INJECTION, SOLUTION INTRAMUSCULAR; INTRAVENOUS at 04:26

## 2022-04-01 NOTE — PERIOP NOTES
TRANSFER - OUT REPORT:    Verbal report given to 576(name) on Farooq Delgado  being transferred to [facility] Rn(unit) for routine post - op       Report consisted of patients Situation, Background, Assessment and   Recommendations(SBAR). Time Pre op antibiotic given:0245  Anesthesia Stop time: 0430  Weber Present on Transfer to floor:yes  Order for Weber on Chart:yes  Discharge Prescriptions with Chart:no    Information from the following report(s) SBAR, OR Summary, Intake/Output, Cardiac Rhythm AFIB and Alarm Parameters  was reviewed with the receiving nurse. Opportunity for questions and clarification was provided. Is the patient on 02? NO       L/Min 0       Other 0    Is the patient on a monitor? NO    Is the nurse transporting with the patient? YES    Surgical Waiting Area notified of patient's transfer from PACU? YES      The following personal items collected during your admission accompanied patient upon transfer:   Dental Appliance: Dental Appliances: None  Vision:    Hearing Aid: Hearing Aid: Bilateral  Jewelry:    Clothing:    Other Valuables: Other Valuables:  Other (comment) (right hearing aide given to the daughter)  Valuables sent to safe:

## 2022-04-01 NOTE — H&P
PLEASE NOTE: I HAVE GENERATED THIS NOTE WITH THE ASSISTANCE OF VOICE-RECOGNITION TECHNOLOGY. PLEASE EXCUSE ANY SPELLING, GRAMMATICAL, AND SYNTAX ERRORS YOU MAY FIND. IF YOU NEED CLARIFICATION ON ANYTHING, PLEASE FEEL FREE TO REACH OUT TO ME.  [address] [facility],[address] [facility] Adult  Hospitalist Group  History and Physical - Dr. Santos Jacobsen    Primary Care Provider: David Olea MD  Date of Service:  See Date Note Was Originated    Chief Complaint: Hip pain    Subjective:     80 y.o. female presents with few hours duration of abrupt onset, gradually worsening, severe, constant, nonradiating, sharp, left-sided hip pain does not associate with any other symptoms. Patient denies exacerbating features  and denies remitting features. Patient is fully awake alert and oriented, but her daughter is also at bedside providing supplementing history. Patient had a purely mechanical fall; no preceding symptoms. Past medical history of CHF, hypertension, no surgical history, no family history of falls    Review of Systems:  12 point ROS obtained and otherwise negative, except as per HPI and above. History reviewed. No pertinent past medical history. History reviewed. No pertinent surgical history. Prior to Admission medications    Medication Sig Start Date End Date Taking? Authorizing Provider   gabapentin (NEURONTIN) 100 mg capsule Take 1 Cap by mouth two (2) times a day. Max Daily Amount: 200 mg. 2/15/21   Ibrahima Alcazar MD   atorvastatin (LIPITOR) 20 mg tablet Take 1 Tab by mouth nightly. 2/15/21   Ibrahima Alcazar MD   aspirin delayed-release 81 mg tablet Take 1 Tab by mouth daily. 2/16/21   Ibrahima Alcazar MD   apixaban (ELIQUIS) 2.5 mg tablet Take 1 Tab by mouth two (2) times a day. 2/15/21   Ibrahima Alcazar MD   mirtazapine (REMERON) 7.5 mg tablet Take 1 Tab by mouth nightly. 2/15/21   Ibrahima Alcazar MD   furosemide (LASIX) 20 mg tablet Take 1 Tab by mouth daily.  2/15/21   Deisy Melissa Cook MD     No Known Allergies   History reviewed. No pertinent family history. .  Social History     Socioeconomic History    Marital status:    . Objective:     Physical Exam:     VS as below    Const'l:          Cachectic body habitus, a&o, no acute distress  Head/Neck:       neck supple, no jvd, trachea midline, carotid midline, no cervical/head mass  Eyes:     spencer, nonicteric sclera, eom intact  ENT:      auditory acuity grossly intact, no nasal deformity  Cardio:           Regular rate regular rhythm, no murmurs/rubs/gallops, no carotid bruit, normal s1, s2  Pulm:     no accessory muscle use, equal normal breath sounds bilaterally, clear tab  Abd:       S NT ND normal bowel sounds x 4 quadrants, no palpable masses  Derm:     no rashes, no ulcers, no lesions  Extr:      No edema, no cyanosis, no calf tenderness, no varicosities; pain limited range of motion of left hip  Neuro:    cn II-XII grossly intact, muscle strength intact, sensation intact  Psych:   mood intact, judgement intact    Data Review: All diagnostic labs and studies have been reviewed. .    Assessment:     Active Problems:    Aphasia (2/13/2021)      Chronic CHF ([address] (2/16/2021)      Hip fracture ([address] (3/31/2022)      Atherosclerotic heart disease of native coronary artery without angina pectoris (4/1/2022)      Benign essential hypertension (4/1/2022)      Chronic a-fib ([address] (4/1/2022)      Hyperlipidemia (4/1/2022)      Stage 3b chronic kidney disease ([address] (4/1/2022)      Chronic combined systolic and diastolic heart failure ([address] (4/1/2022)        Plan:     -Ortho is on consult; will take the patient to surgery today.  -Pain control  -Rest of the orders as per Ortho  -Continue home Lipitor, aspirin  -We will hold home Eliquis, will wait for Ortho input as to when we can restart  -Continue home Remeron  -Continue home Neurontin  -Continue home Lasix      Code status was discussed with the patient.   Code status entered as per the orders  Signed By: Antonio Arellano, DO

## 2022-04-01 NOTE — PROGRESS NOTES
Problem: Falls - Risk of  Goal: *Absence of Falls  Description: Document Michellvijaynanci Barbara Fall Risk and appropriate interventions in the flowsheet. Outcome: Progressing Towards Goal  Note: Fall Risk Interventions:       Mentation Interventions: Bed/chair exit alarm,Door open when patient unattended,Family/sitter at bedside,Increase mobility,More frequent rounding,Reorient patient    Medication Interventions: Bed/chair exit alarm,Patient to call before getting OOB,Teach patient to arise slowly    Elimination Interventions: Bed/chair exit alarm,Call light in reach,Patient to call for help with toileting needs,Stay With Me (per policy),Toilet paper/wipes in reach    History of Falls Interventions: Bed/chair exit alarm,Door open when patient unattended,Investigate reason for fall,Room close to nurse's station         Problem: Patient Education: Go to Patient Education Activity  Goal: Patient/Family Education  Outcome: Progressing Towards Goal     Problem: Pressure Injury - Risk of  Goal: *Prevention of pressure injury  Description: Document Adarsh Scale and appropriate interventions in the flowsheet. Outcome: Progressing Towards Goal  Note: Pressure Injury Interventions:             Activity Interventions: PT/OT evaluation,Pressure redistribution bed/mattress(bed type),Increase time out of bed    Mobility Interventions: PT/OT evaluation,Pressure redistribution bed/mattress (bed type),HOB 30 degrees or less    Nutrition Interventions: Offer support with meals,snacks and hydration,Document food/fluid/supplement intake,Discuss nutritional consult with provider    Friction and Shear Interventions: HOB 30 degrees or less,Lift sheet,Lift team/patient mobility team,Minimize layers                Problem: Patient Education: Go to Patient Education Activity  Goal: Patient/Family Education  Outcome: Progressing Towards Goal     Problem: Hip Fracture: Day of Surgery Post-op Care  Goal: Activity/Safety  Outcome: Progressing Towards Goal  Note: Fall precautions, assiting in turnign and positioning. Bed alarm placed  Goal: Nutrition/Diet  Outcome: Progressing Towards Goal  Note: Patient unable to chew.  Pureed diet put in  Goal: Respiratory  Outcome: Progressing Towards Goal  Note: Encouraged incentive spirometry x10/hr  Goal: Psychosocial  Outcome: Progressing Towards Goal

## 2022-04-01 NOTE — PROGRESS NOTES
Problem: Mobility Impaired (Adult and Pediatric)  Goal: *Acute Goals and Plan of Care (Insert Text)  Description: FUNCTIONAL STATUS PRIOR TO ADMISSION: Patient was modified independent using a HHA or contact guard assistance for functional mobility. HOME SUPPORT PRIOR TO ADMISSION: The patient lives with her daughter and son in law. She has a caregiver who assists her M-F 9-5. Physical Therapy Goals  Initiated 4/1/2022    1. Patient will move from supine to sit and sit to supine  in bed with minimal assistance/contact guard assist within 4 days. 2. Patient will perform sit to stand with minimal assistance/contact guard assist within 4 days. 3. Patient will ambulate with minimal assistance/contact guard assist for 25 feet with the least restrictive device within 4 days. 4. Patient will ascend/descend 3 stairs with 1 handrail(s) with minimal assistance/contact guard assist within 4 days. 5. Patient will verbalize and demonstrate understanding of weight bearing as tolerated precautions per protocol within 4 days. Outcome: Progressing Towards Goal   PHYSICAL THERAPY EVALUATION  Patient: Kb Kaplan (49 y.o. female)  Date: 4/1/2022  Primary Diagnosis: Hip fracture ([facility] [address] [S72.009A]  Procedure(s) (LRB):  PERCUTANEOUS SCREWS OF LEFT HIP (Left) Day of Surgery   Precautions: Fall       ASSESSMENT  Based on the objective data described below, the patient presents with decreased independence with functional mobility and decreased strength post-op day 1 L hip percutaneous pin. She is to be WBAT. Per patients daughter patient does not like to use the RW and declines to use one at home with help. She demonstrates the ability to move the L LE across the bed with Derl Rancher is elevated and patient is provided Mod A for supine to sit. She demonstrates fair static sitting balance EOB. Patient requests to use the restroom and attempts to mobilize without RW. She is provided Total A for bed<>BSC.  She is Max A to sit to supine. L LE is elevated on pillow and ice packs are applied. Current Level of Function Impacting Discharge (mobility/balance): Min A     Functional Outcome Measure: The patient scored 25/100 on the Barthel outcome measure. Other factors to consider for discharge: medical stability, inability to ambulate and transfers at this time, increased age, increased need for assistance      Patient will benefit from skilled therapy intervention to address the above noted impairments. PLAN :  Recommendations and Planned Interventions: bed mobility training, transfer training, gait training, therapeutic exercises, neuromuscular re-education, edema management/control, patient and family training/education, and therapeutic activities      Frequency/Duration: Patient will be followed by physical therapy:  daily to address goals. Recommendation for discharge: (in order for the patient to meet his/her long term goals)  Physical therapy at least 2 days/week in the home v. SNF pending progress with acute care    This discharge recommendation:  Has not yet been discussed the attending provider and/or case management    IF patient discharges home will need the following DME: patient owns DME required for discharge         SUBJECTIVE:   Patient stated [email or web address]     OBJECTIVE DATA SUMMARY:   HISTORY:    History reviewed. No pertinent past medical history. History reviewed. No pertinent surgical history.     Personal factors and/or comorbidities impacting plan of care: please see above    Home Situation  Home Environment: Private residence  # Steps to Enter: 3  Rails to Enter: Yes  One/Two Story Residence: One story  Living Alone: No  Support Systems: Other Family Member(s),Caregiver/Home Care Staff  Patient Expects to be Discharged to[de-identified] Home with home health  Current DME Used/Available at Home: [employer], rollator,Walker, rolling,Wheelchair,Shower chair,Raised toilet seat,Grab bars,Commode, bedside  Tub or Shower Type: Tub/Shower combination    EXAMINATION/PRESENTATION/DECISION MAKING:   Critical Behavior:  Neurologic State: Alert  Orientation Level: Oriented to person,Disoriented to place,Disoriented to situation,Disoriented to time  Cognition: Follows commands,Impaired decision making     Hearing:     Skin:    Edema:   Range Of Motion:  AROM: Generally decreased, functional           PROM: Generally decreased, functional           Strength:    Strength: Generally decreased, functional                    Tone & Sensation:   Tone: Normal              Sensation: Intact               Coordination:  Coordination: Generally decreased, functional  Vision:      Functional Mobility:  Bed Mobility:     Supine to Sit: Moderate assistance;Bed Modified  Sit to Supine: Maximum assistance  Scooting: Moderate assistance  Transfers:  Sit to Stand: Maximum assistance  Stand to Sit: Maximum assistance        Bed to Chair: Maximum assistance              Balance:   Sitting: Impaired; With support  Sitting - Static: Fair (occasional)  Sitting - Dynamic: Not tested  Standing: Impaired; With support  Standing - Static: Constant support;Poor  Standing - Dynamic : Constant support;Poor  Ambulation/Gait Training:                       Left Side Weight Bearing: As tolerated                                 Stairs: Therapeutic Exercises:       Functional Measure:  Barthel Index:    Bathin  Bladder: 0  Bowels: 10  Groomin  Dressin  Feedin  Mobility: 0  Stairs: 0  Toilet Use: 0  Transfer (Bed to Chair and Back): 5  Total: 25/100       The Barthel ADL Index: Guidelines  1. The index should be used as a record of what a patient does, not as a record of what a patient could do. 2. The main aim is to establish degree of independence from any help, physical or verbal, however minor and for whatever reason. 3. The need for supervision renders the patient not independent.   4. A patient's performance should be established using the best available evidence. Asking the patient, friends/relatives and nurses are the usual sources, but direct observation and common sense are also important. However direct testing is not needed. 5. Usually the patient's performance over the preceding 24-48 hours is important, but occasionally longer periods will be relevant. 6. Middle categories imply that the patient supplies over 50 per cent of the effort. 7. Use of aids to be independent is allowed. Score Interpretation (from [facility])    Independent   60-79 Minimally independent   40-59 Partially dependent   20-39 Very dependent   <20 Totally dependent     -López Bronson., Barthel, D.W. (1965). Functional evaluation: the Barthel Index. [address] ([address] (1997). The Barthel activities of daily living index: self-reporting versus actual performance in the old (> or = 75 years). Journal of [address] [place] [address] 45(7), [facility], JASVIR, Radha Price., Jodie Pizarro. (1999). Measuring the change in disability after inpatient rehabilitation; comparison of the responsiveness of the Barthel Index and Functional Dale Measure. Journal of Neurology, Neurosurgery, and Psychiatry, 66(4), 863-036. Oksana Thompson, N.J.A, SHARATH Stewart, & Leilani Steven, M.A. (2004) Assessment of post-stroke quality of life in cost-effectiveness studies: The usefulness of the Barthel Index and the EuroQoL-5D.  Quality of Life Research, 15, 197-36            Physical Therapy Evaluation Charge Determination   History Examination Presentation Decision-Making   MEDIUM  Complexity : 1-2 comorbidities / personal factors will impact the outcome/ POC  LOW Complexity : 1-2 Standardized tests and measures addressing body structure, function, activity limitation and / or participation in recreation  MEDIUM Complexity : Evolving with changing characteristics  Other outcome measures Barthel  HIGH       Based on the above components, the patient evaluation is determined to be of the following complexity level: MEDIUM    Pain Rating:      Activity Tolerance:   Fair    After treatment patient left in no apparent distress:   Supine in bed, Call bell within reach, Bed / chair alarm activated, Caregiver / family present, and Side rails x 3    COMMUNICATION/EDUCATION:   The patients plan of care was discussed with: Occupational therapist and Registered nurse. Fall prevention education was provided and the patient/caregiver indicated understanding., Patient/family have participated as able in goal setting and plan of care. , and Patient/family agree to work toward stated goals and plan of care.     Thank you for this referral.  Lizzy Wilkinson, PT

## 2022-04-01 NOTE — ANESTHESIA PREPROCEDURE EVALUATION
Relevant Problems   CARDIOVASCULAR   (+) Chronic CHF (HCC)       Anesthetic History   No history of anesthetic complications            Review of Systems / Medical History  Patient summary reviewed, nursing notes reviewed and pertinent labs reviewed    Pulmonary  Within defined limits                 Neuro/Psych       CVA       Cardiovascular          CHF: NYHA Classification II  Dysrhythmias : atrial fibrillation  PAD    Exercise tolerance: <4 METS  Comments: LVEF 30-35%   GI/Hepatic/Renal         Renal disease: CRI       Endo/Other  Within defined limits           Other Findings            Physical Exam    Airway  Mallampati: II  TM Distance: > 6 cm  Neck ROM: normal range of motion   Mouth opening: Normal     Cardiovascular  Regular rate and rhythm,  S1 and S2 normal,  no murmur, click, rub, or gallop             Dental    Dentition: Poor dentition     Pulmonary  Breath sounds clear to auscultation               Abdominal  GI exam deferred       Other Findings            Anesthetic Plan    ASA: 3  Anesthesia type: general          Induction: Intravenous  Anesthetic plan and risks discussed with: Son / Daughter

## 2022-04-01 NOTE — PROGRESS NOTES
Orthopedic Progress Note    S: Pain well controlled, no complaints;Denies numbness, tingling, focal weakness, cp, sob, fever, chills    O: NAD, respirations unlabored; Dressings CDI; thigh soft, no edema, no posterior calf ttp; + DF/PF, SILT; Cap refill brisk, foot warm, DP2+    Patient Vitals for the past 4 hrs:   Temp Pulse BP   04/01/22 0905 97.3 °F (36.3 °C) 73 (!) 149/77   04/01/22 0729 98 °F (36.7 °C) 69 (!) 141/84     Recent Labs     03/31/22  1700   HGB 14.2   HCT 42.6      BUN 28*   CREA 1.64*   K 3.2*          XR HIP LT DURING OR PROC  Narrative: COMPLIANCE ONLY    INDICATION: surgery    FINDINGS:   Portable imaging obtained and fluoroscopy utilized during procedure. Impression: Intraoperative views as above. See operative report for details.        A/P:  Procedure: Procedure(s):  PERCUTANEOUS SCREWS OF LEFT HIP  Post Op day: Day of Surgery    - Finish ppx abx  - DVT ppx - resume Eliquis; SCDs  - PT/OT - WBAT LLE  - Pain - Acetaminophen, Tramadol; Ice, Elevation, Ace wrap as needed  - Dressing - Leave in place if it remains dry and intact x 1 week; change as needed for saturation with dry sterile island dressing  - Dispo - Pending PT/OT recs, likely home with home health as she has assistance at home; needs f/u in 2 weeks     Fawn Costa 171

## 2022-04-01 NOTE — PROGRESS NOTES
Problem: Falls - Risk of  Goal: *Absence of Falls  Description: Document Pastor Acostaann Fall Risk and appropriate interventions in the flowsheet.   Outcome: Progressing Towards Goal  Note: Fall Risk Interventions:       Mentation Interventions: Adequate sleep, hydration, pain control,Reorient patient,Update white board    Medication Interventions: Bed/chair exit alarm    Elimination Interventions: Bed/chair exit alarm,Call light in reach,Patient to call for help with toileting needs

## 2022-04-01 NOTE — PROGRESS NOTES
Physical Therapy Note    PT orders received and acknowledged Chart reviewed. Patient is evaluated. Recommend HH PT at D/C.

## 2022-04-01 NOTE — PERIOP NOTES
TRANSFER - IN REPORT:    Verbal report received from [facility] Rn(name) on Odetta January  being received from 576(unit) for ordered procedure      Report consisted of patients Situation, Background, Assessment and   Recommendations(SBAR). Information from the following report(s) ED Summary and Recent Results was reviewed with the receiving nurse. Opportunity for questions and clarification was provided. Assessment completed upon patients arrival to unit and care assumed.

## 2022-04-01 NOTE — ROUTINE PROCESS
TRANSFER - OUT REPORT:    Verbal report given to unit RN(name) on An Pineda  being transferred to [facility](unit) for routine progression of care       Report consisted of patients Situation, Background, Assessment and   Recommendations(SBAR). Information from the following report(s) SBAR was reviewed with the receiving nurse. Lines:   Peripheral IV 03/31/22 Anterior;Proximal;Right Forearm (Active)        Opportunity for questions and clarification was provided.       Patient transported with:   Tech, and family

## 2022-04-01 NOTE — PROGRESS NOTES
Occupational Therapy Note:    Orders received, chart reviewed. Pt is POD 0 s/p L hip percutaneous screw fixation d/t non-displaced femoral neck fracture from GLF. OT will follow up tomorrow to complete evaluation, per guidelines. Met with Pts caregiver at bedside to obtain PLOF. Pt lives in mother-in-law suite on daughter and son-in-law's property. Has paid caregivers M-F from 9am-5pm who provide assist with ADL and meal prep. Pt ambulates independently within her home and requires HHA for daily walk to the mailbox. Pt requires Max A with bathing and UB ADL, dependent for LB ADL. Owns W/C, RW and Rollator yet does not use. Has tub-shower. May benefit from tub-transfer bench for weekly baths.      [facility], OTR/L

## 2022-04-01 NOTE — ANESTHESIA POSTPROCEDURE EVALUATION
Post-Anesthesia Evaluation and Assessment    Patient: Yuri Sinha MRN: [id number]  SSN: [id number]    YOB: 1926  Age: 80 y.o. Sex: female      I have evaluated the patient and they are stable and ready for discharge from the PACU. Cardiovascular Function/Vital Signs  Visit Vitals  BP (!) 141/75   Pulse 79   Temp 36.6 °C (97.9 °F)   Resp 14   Ht 5' 2\" (1.575 m)   Wt 40.8 kg (90 lb)   SpO2 100%   BMI 16.46 kg/m²       Patient is status post General anesthesia for Procedure(s):  PERCUTANEOUS SCREWS OF LEFT HIP. Nausea/Vomiting: None    Postoperative hydration reviewed and adequate. Pain:  Pain Scale 1: FLACC (04/01/22 0348)  Pain Intensity 1: 5 (CRNA MEDICATED THE PATIENT) (04/01/22 0348)   Managed    Neurological Status:   Neuro (WDL): Exceptions to WDL (04/01/22 0348)  Neuro  Neurologic State: Alert (04/01/22 0348)  Orientation Level: Oriented to person (04/01/22 0348)  Cognition: Decreased attention/concentration;Decreased command following (04/01/22 0348)  Speech: Clear (04/01/22 0348)  LUE Motor Response: Spontaneous ; Purposeful (04/01/22 0348)  LLE Motor Response: Weak (04/01/22 0348)  RUE Motor Response: Spontaneous ; Purposeful (04/01/22 0348)  RLE Motor Response: Spontaneous  (04/01/22 0348)   At baseline    Mental Status, Level of Consciousness: Alert and  oriented to person, place, and time    Pulmonary Status:   O2 Device: Nasal cannula (04/01/22 0356)   Adequate oxygenation and airway patent    Complications related to anesthesia: None    Post-anesthesia assessment completed. No concerns    Signed By: Mervat Soto MD     April 1, 2022              Procedure(s):  PERCUTANEOUS SCREWS OF LEFT HIP.    general    <BSHSIANPOST>    INITIAL Post-op Vital signs:   Vitals Value Taken Time   /88 04/01/22 0405   Temp     Pulse 81 04/01/22 0414   Resp 16 04/01/22 0414   SpO2 89 % 04/01/22 0413   Vitals shown include unvalidated device data.

## 2022-04-01 NOTE — ED NOTES
Family at bedside with [facility] PA discussing POC r/t SX. Pt has potential to have tonight if cleared by Ortho resident.

## 2022-04-01 NOTE — PROGRESS NOTES
JACEY: Plan for discharge home with family, private duty caregivers and home health. [facility] has accepted patient for [facility] PT/OT. Patient owns walker and wheelchair. Patient will likely need BLS transport home. Care Management Interventions  PCP Verified by CM: Yes  Mode of Transport at Discharge: BLS  Transition of Care Consult (CM Consult): [address]: No  Reason Outside [place]: Patient already serviced by other home care/hospice agency  MyChart Signup: No  Discharge Durable Medical Equipment: No  Physical Therapy Consult: Yes  Occupational Therapy Consult: Yes  Support Systems: Caregiver/Home Care Staff,Child(barry)  Confirm Follow Up Transport: Other (see comment) (BLS)  The Patient and/or Patient Representative was Provided with a Choice of Provider and Agrees with the Discharge Plan?: Yes  Freedom of Choice List was Provided with Basic Dialogue that Supports the Patient's Individualized Plan of Care/Goals, Treatment Preferences and Shares the Quality Data Associated with the Providers?: Yes  Discharge Location  Patient Expects to be Discharged to[de-identified] Home with home health     Reason for Admission:  Hip fracture                     RUR Score:    10%                 Plan for utilizing home health: The Plan for Transition of Care is related to the following treatment goals: home health    The Patient and/or patient representative was provided with a choice of provider and agrees   with the discharge plan. [x] Yes [] No    Freedom of choice list was provided with basic dialogue that supports the patient's individualized plan of care/goals, treatment preferences and shares the quality data associated with the providers.  [x] Yes [] No       PCP: First and Last name:  Trae Jimenez MD     Name of Practice:    Are you a current patient: Yes/No:    Approximate date of last visit: 2 months ago   Can you participate in a virtual visit with your PCP: Current Advanced Directive/Advance Care Plan: Full Code      Healthcare Decision Maker:   Click here to complete HealthCare Decision Makers including selection of the Healthcare Decision Maker Relationship (ie \"Primary\")             Primary Decision Maker: Trey Almaraz - Daughter - [phone]                  Transition of Care Plan:      Home with [place]                         Chart reviewed. CM called the daughter, Araseli Lee [phone] and left message. CM received call from the daughter. Patient lives with daughter and son-in-law in their mother in law suite. CM confirmed the address on face sheet is correct. Patient has 24/7 care between the family and private duty caregivers. Caregivers are there M-F from 9-5. Daughter stated patient was ambulatory without DME prior to admission, however patient does own a walker and a wheelchair. There are 3 stairs to enter the home. Daughter stated patient has used [facility] [employer] for [place] in the past, and preference to use this agency again. CM sent referral to [facility] [employer] via [employer]. Patient will likely need BLS transport home at discharge.     Caridad Hamman, BSW/CRM

## 2022-04-01 NOTE — PROGRESS NOTES
Can proceed for surgery with moderate-high risk stratification due to EF of 30%, Age, and mild ischemia on EKG. I think the benefit of the surgery outweighs the risks involved. I discussed this in depth with the patient and her daughter at bedside and they would like to proceed despite the risks.

## 2022-04-01 NOTE — CONSULTS
Comprehensive Nutrition Assessment    Type and Reason for Visit: Initial,Consult    Nutrition Recommendations/Plan:      1. Continue with Regular diet order    2. RD will order Ensure Enlive (high calorie, high protein) and Magic Cup 1x/day each      Nutrition Assessment:    81 yo female admitted for hip fracture. PMHx: CHF, aphasia, [place]ak. S/P left hip percutaneous screw fixation 4/1. Pt's aid from home in room with pt at time of visit, was able to answer all my questions. Reports that pt has always been very thin/small, unsure of any weight loss. Weight hx in EMR indicates 27% loss over last year which is significant for time frame. To note, current weight is stated, will need to obtain a bed-scale weight. Aid states that pt has a good appetite at home, she is there for breakfast and lunch with her. She usually eats 2 eggs, 1/2 croissant, and fruit for breakfast; grilled cheese and tomato soup for lunch. She does note that pt is a picky eater. No teeth, has dentures but they do not fit properly so she does not wear them. Does have difficulty chewing some meats and hard foods but aid does not think she would like consistency of food changed. Encouraged aid to place meal orders for pt in order for her to receive foods she can chew and will eat. She has never seen the pt drink Ensure but notes that she loves ice-cream so she would probably drink it if it was cold and she thought it was a milkshake. Will order one/day (strawberry) along with [employer]. Po intakes were poor at breakfast today, aid thinks she is not feeling the best from having surgery at 2am today.    Labs: K+ 3.2      Malnutrition Assessment:  Malnutrition Status:  Severe malnutrition    Context:  Chronic illness     Findings of the 6 clinical characteristics of malnutrition:   Energy Intake:  Mild decrease in energy intake (specify)  Weight Loss:  7.000 - Greater than 20% over 1 year     Body Fat Loss:  7 - Severe body fat loss, Orbital,Buccal region   Muscle Mass Loss:  7 - Severe muscle mass loss, Clavicles (pectoralis &deltoids)  Fluid Accumulation:  No significant fluid accumulation,     Strength:  Not performed       Nutritionally Significant Medications: Ca+/Vit D, Lasix, Remeron, Miralax, Pericolace; NaCl at 125 ml/hr    Estimated Daily Nutrient Needs:  Energy (kcal): 6199-1979 (35-40 kcals/kg); Weight Used for Energy Requirements: Current  Protein (g): 65-74 (1.6-1.8 gm/kg); Weight Used for Protein Requirements: Current  Fluid (ml/day): 1620-4887; Method Used for Fluid Requirements: 1 ml/kcal    Nutrition Related Findings:       BM: 3/31   Edema: none  Wounds:  Surgical incision       Current Nutrition Therapies:   Diet: Regular  Supplements: none  Additional Caloric Sources: none    Meal intake: No data found. Supplement Intake: No data found.     Anthropometric Measures:  · Height:  5' 2\" (157.5 cm)  · Current Body Wt:  40.8 kg (89 lb 15.2 oz)   · Admission Body Wt:       · Usual Body Wt:        · Ideal Body Wt:  110:  81.8 %   · Adjusted Body Weight:   ; Weight Adjustment for: No adjustment   · Adjusted BMI:       · BMI Categories:  Underweight (BMI less than 22) age over 72     Wt Readings from Last 10 Encounters:   03/31/22 40.8 kg (90 lb)   02/15/21 55.8 kg (123 lb)       Nutrition Diagnosis:   · Underweight related to inadequate protein-energy intake as evidenced by BMI (16.4)      Nutrition Interventions:   Food and/or Nutrient Delivery: Continue current diet,Start oral nutrition supplement  Nutrition Education and Counseling: No recommendations at this time  Coordination of Nutrition Care: Continue to monitor while inpatient    Goals:  PO intakes > 75% meals + ONS to promote weight gain 0.5-1lb within next 5-7 days       Nutrition Monitoring and Evaluation:   Behavioral-Environmental Outcomes: None identified  Food/Nutrient Intake Outcomes: Food and nutrient intake,Supplement intake  Physical Signs/Symptoms Outcomes: Biochemical data,GI status,Weight,Chewing or swallowing    Discharge Planning:    Continue current diet,Continue oral nutrition supplement     Ellen Mahoney RD  Contact via [employer]

## 2022-04-01 NOTE — OP NOTES
Patient Name:  Jennifer Valverde  MRN:    [id number]  Current Date:  4/1/2022  Admission Date:  3/31/2022     Surgeon:   Guilherme Camilo DO      Assistant(s):   Arlette Frank    Date of Procedure :  4/1/2022    Preoperative Diagnosis:  Left, nondisplaced femoral neck fracture    Postoperative Diagnosis:  Same as PreOp Dx    Procedure(s) Performed:  Left hip in-situ percutaneous screw fixation    Anesthesia:  General    Antibiotics:  Ancef 2 g    Other Medications:  None    Estimated Blood Loss:  5cc    IV Fluids:  See anesthesia note    Drains:  None    Pertinent Findings/Specimens:  Left femoral neck fracture    Implants:  synthes 6.5mm cannulated screws x 3 (90mm, 82YP, 89WX)    Complications:  None    Brief HPI:  81 yo F who presented to [facility] with left hip pain after a GLF Tuesday, 3/29/22. She had on going pain and difficulty with ambulation. She was found to have a nondisplaced Left femoral neck fracture found on plain radiographs and confirmed on CT. Findings were discussed with patient with recommendations for screw fixation vs arthroplasty. Due to her overall health and the fracture pattern, a joint decision was made to proceed with percutaneous screw fixation. The procedure, risks, benefits, alternative, and rehab/expectations were discussed. All questions were answered and she wished to proceed. Procedure in Detail:  The patient was met and greeted in the preoperative holding area. The procedure was once again reviewed with the patient and her daughter, left hip closed reduction with screw fixation vs insitu per screw fixaton, including the planned procedure, risks, benefits, alternatives, and rehabilitation. Consent was verified. The operative site,left hip, was signed by me. The patient was then taken to the operative suite. Anesthesia was achieved. The patient was moved to the operative table and positioned being sure to secure the patient to the table and properly padding all bony prominences.  The patient was then prepped and draped in normal sterile fashion. A proper timeout was then performed ensuring the correct patient, site, and procedure were to be performed. All needed imaging was available in the room. Antibiotics were administered prior to incision. Next prior to incision imaging was obtained intra-operative to ensure the fracture was nondisplaced and unchanged which it was. Next a skin incision was made approximately 3 cm in length on the lateral hip. The three guidewires were placed using fluoroscopy to ensure proper placement within the femoral head and neck. These were then measured, . The screws were than placed and position was again confirmed with fluoroscopy. Good configuration of the screws and maintained reduction of the femoral neck fracture was noted. The wound was then irrigated and closed in a layed fashion with vicryl and staples. Then sterile dressing was placed with adaptic, 4x4, and tegaderm. Sponge, needle, and instrument counts were correct at the end of the procedure. The patient was awoken and taken to the recovery room in stable condition. Postoperative Plan:    Post op abx  WBAT LLE. Ice  Pain control  Monitor labs  DVT ppx: can restart home dose eliquis  Okay for diet  Foot pumps  PT/OT  DSG check  q shift. Reinforce or change PRN          KAYA Torres DO  Orthopedic Surgery  4/1/2022 3:54 AM

## 2022-04-01 NOTE — PROGRESS NOTES
[facility]                                                                                          Hospitalist Progress Note  Ana Linares NP  Answering service: [phone] from in house phone        Date of Service:  2022  NAME:  Broderick Pope  :  1926  MRN:  [id number]      Admission Summary:   77-year-old female who sustained a fall at home. She has a past medical history significant for aphasia, chronic systolic congestive heart failure. She has a history of atrial fibrillation for which she takes apixaban. Work-up in the emergency department revealed a left nondisplaced femoral fracture. Patient had reportedly fallen a couple of days prior to presentation and was noted to be dragging her leg when walking  Interval history / Subjective:   I saw the patient this morning on rounds. Had returned from the operating room where she underwent screw fixation. Caretaker was at the bedside at the moment of the encounter.      Assessment & Plan:     Left nondisplaced femoral fracture  Postop day 0 percutaneous screw fixation  Holding apixaban for now, until cleared by orthopedics  Continuing multimodal pain control  Physical therapy to evaluate    Chronic systolic congestive heart failure  Stable, not in exacerbation  Lungs clear on exam  Continuing furosemide    Hypertension  Blood pressure currently stable  Continuing furosemide    Atrial fibrillation  Heart rate currently controlled  We will restart apixaban when cleared by orthopedics    Code status: Full  DVT prophylaxis: [address] discussed with: Patient/Family, Nurse and   Anticipated Disposition: SNF/LTC and  PT, OT, RN  Anticipated Discharge: 24 hours to 48 hours     Hospital Problems  Date Reviewed: 2022          Codes Class Noted POA    Atherosclerotic heart disease of native coronary artery without angina pectoris ICD-10-CM: I25.10  ICD-9-CM: 414.01 4/1/2022 Yes        Benign essential hypertension ICD-10-CM: I10  ICD-9-CM: 401.1  4/1/2022 Yes        Chronic a-fib (HCC) ICD-10-CM: I48.20  ICD-9-CM: 427.31  4/1/2022 Yes        Hyperlipidemia ICD-10-CM: E78.5  ICD-9-CM: 272.4  4/1/2022 Yes        Stage 3b chronic kidney disease ([facility] ICD-10-CM: N18.32  ICD-9-CM: 585.3  4/1/2022 Yes        Chronic combined systolic and diastolic heart failure ([facility] ICD-10-CM: I50.42  ICD-9-CM: 428.42  4/1/2022 Yes        Hip fracture ([facility] ICD-10-CM: S72.009A  ICD-9-CM: 820.8  3/31/2022 Unknown        Chronic CHF (HCC) (Chronic) ICD-10-CM: I50.9  ICD-9-CM: 428.0  2/16/2021 Yes        Aphasia ICD-10-CM: R47.01  ICD-9-CM: 784.3  2/13/2021 Yes                Review of Systems:   A comprehensive review of systems was negative except for that written in the HPI. Vital Signs:    Last 24hrs VS reviewed since prior progress note. Most recent are:  Visit Vitals  BP (!) 149/77 (BP 1 Location: Left upper arm, BP Patient Position: At rest)   Pulse 73   Temp 97.3 °F (36.3 °C)   Resp 20   Ht 5' 2\" (1.575 m)   Wt 40.8 kg (90 lb)   SpO2 97%   BMI 16.46 kg/m²         Intake/Output Summary (Last 24 hours) at 4/1/2022 1428  Last data filed at 4/1/2022 0430  Gross per 24 hour   Intake 520 ml   Output 315 ml   Net 205 ml        Physical Examination:     I had a face to face encounter with this patient and independently examined them on 4/1/2022 as outlined below:          Constitutional:  No acute distress, cooperative, pleasant    ENT:  Oral mucosa moist, oropharynx benign. Resp:  CTA bilaterally. No wheezing/rhonchi/rales. No accessory muscle use   CV:  Regular rhythm, normal rate, no murmurs, gallops, rubs    GI:  Soft, non distended, non tender. normoactive bowel sounds, no hepatosplenomegaly     Musculoskeletal:  No edema, warm, 2+ pulses throughout    Neurologic:  Moves all extremities.   AAOx3, CN II-XII reviewed            Data Review:    Review and/or order of clinical lab test  Review and/or order of tests in the radiology section of [facility]      Labs:     Recent Labs     03/31/22  1700   WBC 6.9   HGB 14.2   HCT 42.6        Recent Labs     03/31/22  1700      K 3.2*      CO2 23   BUN 28*   CREA 1.64*   GLU 91   CA 9.3     Recent Labs     03/31/22  1700   ALT 44   *   TBILI 0.9   TP 7.5   ALB 3.8   GLOB 3.7     Recent Labs     03/31/22  1700   INR 1.1   PTP 11.0      No results for input(s): FE, TIBC, PSAT, FERR in the last 72 hours. No results found for: FOL, RBCF   No results for input(s): PH, PCO2, PO2 in the last 72 hours. No results for input(s): CPK, CKNDX, TROIQ in the last 72 hours.     No lab exists for component: CPKMB  Lab Results   Component Value Date/Time    Cholesterol, total 178 02/13/2021 01:22 PM    HDL Cholesterol 48 02/13/2021 01:22 PM    LDL, calculated 91 02/13/2021 01:22 PM    Triglyceride 195 (H) 02/13/2021 01:22 PM    CHOL/HDL Ratio 3.7 02/13/2021 01:22 PM     No results found for: [facility]  Lab Results   Component Value Date/Time    Color YELLOW/STRAW 03/31/2022 07:28 PM    Appearance CLEAR 03/31/2022 07:28 PM    Specific gravity 1.016 03/31/2022 07:28 PM    pH (UA) 5.5 03/31/2022 07:28 PM    Protein 30 (A) 03/31/2022 07:28 PM    Glucose Negative 03/31/2022 07:28 PM    Ketone Negative 03/31/2022 07:28 PM    Bilirubin Negative 03/31/2022 07:28 PM    Urobilinogen 0.2 03/31/2022 07:28 PM    Nitrites Negative 03/31/2022 07:28 PM    Leukocyte Esterase SMALL (A) 03/31/2022 07:28 PM    Epithelial cells FEW 03/31/2022 07:28 PM    Bacteria Negative 03/31/2022 07:28 PM    WBC 20-50 03/31/2022 07:28 PM    RBC 0-5 03/31/2022 07:28 PM         Medications Reviewed:     Current Facility-Administered Medications   Medication Dose Route Frequency    lidocaine (PF) (XYLOCAINE) 10 mg/mL (1 %) injection 0.1 mL  0.1 mL SubCUTAneous PRN    fentaNYL citrate (PF) injection 50 mcg  50 mcg IntraVENous PRN    midazolam (VERSED) injection 1 mg  1 mg IntraVENous PRN    midazolam (VERSED) injection 1 mg  1 mg IntraVENous PRN    glycopyrrolate (ROBINUL) injection 0.2 mg  0.2 mg IntraVENous ONCE PRN    ropivacaine (PF) (NAROPIN) 5 mg/mL (0.5 %) injection 30 mL  30 mL Peripheral Nerve Block PRN    HYDROcodone-acetaminophen (NORCO) 5-325 mg per tablet 1 Tablet  1 Tablet Oral PRN    morphine injection 2 mg  2 mg IntraVENous Multiple    HYDROmorphone (DILAUDID) injection 0.2 mg  0.2 mg IntraVENous Multiple    albuterol (PROVENTIL VENTOLIN) nebulizer solution 2.5 mg  2.5 mg Inhalation PRN    ondansetron (ZOFRAN) injection 4 mg  4 mg IntraVENous PRN    midazolam (VERSED) injection 0.5 mg  0.5 mg IntraVENous Q5MIN PRN    diphenhydrAMINE (BENADRYL) injection 12.5 mg  12.5 mg IntraVENous PRN    sodium chloride (NS) flush 5-40 mL  5-40 mL IntraVENous Q8H    sodium chloride (NS) flush 5-40 mL  5-40 mL IntraVENous PRN    naloxone (NARCAN) injection 0.4 mg  0.4 mg IntraVENous PRN    [START ON 4/2/2022] calcium-vitamin D (OS-GIRISH +D3) 500 mg-200 unit per tablet 1 Tablet  1 Tablet Oral TID WITH MEALS    senna-docusate (PERICOLACE) 8.6-50 mg per tablet 1 Tablet  1 Tablet Oral BID    polyethylene glycol (MIRALAX) packet 17 g  17 g Oral DAILY    [START ON 4/3/2022] bisacodyL (DULCOLAX) suppository 10 mg  10 mg Rectal DAILY PRN    traMADoL (ULTRAM) tablet 50 mg  50 mg Oral Q6H PRN    oxyCODONE IR (ROXICODONE) tablet 5 mg  5 mg Oral Q4H PRN    [Held by provider] apixaban (ELIQUIS) tablet 2.5 mg  2.5 mg Oral BID    gabapentin (NEURONTIN) capsule 100 mg  100 mg Oral BID    atorvastatin (LIPITOR) tablet 20 mg  20 mg Oral QHS    aspirin delayed-release tablet 81 mg  81 mg Oral DAILY    mirtazapine (REMERON) tablet 7.5 mg  7.5 mg Oral QHS    furosemide (LASIX) tablet 20 mg  20 mg Oral DAILY     ______________________________________________________________________  EXPECTED LENGTH OF STAY: - - -  ACTUAL LENGTH OF STAY:          1                 Negar Matos NP

## 2022-04-02 LAB
ALBUMIN SERPL-MCNC: 2.9 G/DL (ref 3.5–5)
ALBUMIN/GLOB SERPL: 1 {RATIO} (ref 1.1–2.2)
ALP SERPL-CCNC: 91 U/L (ref 45–117)
ALT SERPL-CCNC: 11 U/L (ref 12–78)
ANION GAP SERPL CALC-SCNC: 5 MMOL/L (ref 5–15)
AST SERPL-CCNC: 24 U/L (ref 15–37)
BASOPHILS # BLD: 0 K/UL (ref 0–0.1)
BASOPHILS NFR BLD: 0 % (ref 0–1)
BILIRUB SERPL-MCNC: 0.3 MG/DL (ref 0.2–1)
BUN SERPL-MCNC: 28 MG/DL (ref 6–20)
BUN/CREAT SERPL: 15 (ref 12–20)
CALCIUM SERPL-MCNC: 8.2 MG/DL (ref 8.5–10.1)
CHLORIDE SERPL-SCNC: 105 MMOL/L (ref 97–108)
CO2 SERPL-SCNC: 28 MMOL/L (ref 21–32)
CREAT SERPL-MCNC: 1.81 MG/DL (ref 0.55–1.02)
DIFFERENTIAL METHOD BLD: ABNORMAL
EOSINOPHIL # BLD: 0 K/UL (ref 0–0.4)
EOSINOPHIL NFR BLD: 1 % (ref 0–7)
ERYTHROCYTE [DISTWIDTH] IN BLOOD BY AUTOMATED COUNT: 14.8 % (ref 11.5–14.5)
GLOBULIN SER CALC-MCNC: 3 G/DL (ref 2–4)
GLUCOSE SERPL-MCNC: 125 MG/DL (ref 65–100)
HCT VFR BLD AUTO: 35.3 % (ref 35–47)
HGB BLD-MCNC: 11.1 G/DL (ref 11.5–16)
IMM GRANULOCYTES # BLD AUTO: 0 K/UL (ref 0–0.04)
IMM GRANULOCYTES NFR BLD AUTO: 0 % (ref 0–0.5)
LYMPHOCYTES # BLD: 1.3 K/UL (ref 0.8–3.5)
LYMPHOCYTES NFR BLD: 20 % (ref 12–49)
MCH RBC QN AUTO: 30.7 PG (ref 26–34)
MCHC RBC AUTO-ENTMCNC: 31.4 G/DL (ref 30–36.5)
MCV RBC AUTO: 97.8 FL (ref 80–99)
MONOCYTES # BLD: 0.6 K/UL (ref 0–1)
MONOCYTES NFR BLD: 10 % (ref 5–13)
NEUTS SEG # BLD: 4.7 K/UL (ref 1.8–8)
NEUTS SEG NFR BLD: 70 % (ref 32–75)
NRBC # BLD: 0 K/UL (ref 0–0.01)
NRBC BLD-RTO: 0 PER 100 WBC
PLATELET # BLD AUTO: 157 K/UL (ref 150–400)
PMV BLD AUTO: 11 FL (ref 8.9–12.9)
POTASSIUM SERPL-SCNC: 4 MMOL/L (ref 3.5–5.1)
PROT SERPL-MCNC: 5.9 G/DL (ref 6.4–8.2)
RBC # BLD AUTO: 3.61 M/UL (ref 3.8–5.2)
SODIUM SERPL-SCNC: 138 MMOL/L (ref 136–145)
WBC # BLD AUTO: 6.6 K/UL (ref 3.6–11)

## 2022-04-02 PROCEDURE — 74011250637 HC RX REV CODE- 250/637: Performed by: STUDENT IN AN ORGANIZED HEALTH CARE EDUCATION/TRAINING PROGRAM

## 2022-04-02 PROCEDURE — 80053 COMPREHEN METABOLIC PANEL: CPT

## 2022-04-02 PROCEDURE — 97530 THERAPEUTIC ACTIVITIES: CPT

## 2022-04-02 PROCEDURE — 36415 COLL VENOUS BLD VENIPUNCTURE: CPT

## 2022-04-02 PROCEDURE — 74011000250 HC RX REV CODE- 250: Performed by: STUDENT IN AN ORGANIZED HEALTH CARE EDUCATION/TRAINING PROGRAM

## 2022-04-02 PROCEDURE — 65270000029 HC RM PRIVATE

## 2022-04-02 PROCEDURE — 97535 SELF CARE MNGMENT TRAINING: CPT

## 2022-04-02 PROCEDURE — 97165 OT EVAL LOW COMPLEX 30 MIN: CPT

## 2022-04-02 PROCEDURE — 85025 COMPLETE CBC W/AUTO DIFF WBC: CPT

## 2022-04-02 RX ADMIN — OYSTER SHELL CALCIUM WITH VITAMIN D 1 TABLET: 500; 200 TABLET, FILM COATED ORAL at 12:34

## 2022-04-02 RX ADMIN — GABAPENTIN 100 MG: 100 CAPSULE ORAL at 18:33

## 2022-04-02 RX ADMIN — APIXABAN 2.5 MG: 2.5 TABLET, FILM COATED ORAL at 18:34

## 2022-04-02 RX ADMIN — OYSTER SHELL CALCIUM WITH VITAMIN D 1 TABLET: 500; 200 TABLET, FILM COATED ORAL at 07:58

## 2022-04-02 RX ADMIN — ATORVASTATIN CALCIUM 20 MG: 10 TABLET, FILM COATED ORAL at 23:20

## 2022-04-02 RX ADMIN — SENNOSIDES AND DOCUSATE SODIUM 1 TABLET: 50; 8.6 TABLET ORAL at 18:34

## 2022-04-02 RX ADMIN — MIRTAZAPINE 7.5 MG: 15 TABLET ORAL at 23:19

## 2022-04-02 RX ADMIN — OYSTER SHELL CALCIUM WITH VITAMIN D 1 TABLET: 500; 200 TABLET, FILM COATED ORAL at 18:34

## 2022-04-02 RX ADMIN — ASPIRIN 81 MG: 81 TABLET, COATED ORAL at 10:04

## 2022-04-02 RX ADMIN — POLYETHYLENE GLYCOL 3350 17 G: 17 POWDER, FOR SOLUTION ORAL at 10:04

## 2022-04-02 RX ADMIN — TRAMADOL HYDROCHLORIDE 50 MG: 50 TABLET, COATED ORAL at 12:34

## 2022-04-02 RX ADMIN — GABAPENTIN 100 MG: 100 CAPSULE ORAL at 10:04

## 2022-04-02 RX ADMIN — SODIUM CHLORIDE, PRESERVATIVE FREE 10 ML: 5 INJECTION INTRAVENOUS at 23:27

## 2022-04-02 RX ADMIN — SODIUM CHLORIDE, PRESERVATIVE FREE 10 ML: 5 INJECTION INTRAVENOUS at 07:59

## 2022-04-02 RX ADMIN — FUROSEMIDE 20 MG: 20 TABLET ORAL at 10:04

## 2022-04-02 RX ADMIN — SODIUM CHLORIDE, PRESERVATIVE FREE 10 ML: 5 INJECTION INTRAVENOUS at 18:34

## 2022-04-02 RX ADMIN — SENNOSIDES AND DOCUSATE SODIUM 1 TABLET: 50; 8.6 TABLET ORAL at 10:04

## 2022-04-02 NOTE — PROGRESS NOTES
Orthopedic Progress Note    S: Pain well controlled, no new complaints, endorses she is \"tired\" ; Denies numbness, tingling, focal weakness, cp, sob, fever, chills    O: NAD, respirations unlabored; Dressings CDI; thigh soft, no edema, no posterior calf ttp; DF/PF 5/5, SILT; Cap refill brisk, foot warm, DP2+    No data found. Recent Labs     04/02/22  0357 03/31/22  1700   HGB 11.1* 14.2   HCT 35.3 42.6    177   BUN 28* 28*   CREA 1.81* 1.64*   K 4.0 3.2*    137       XR HIP LT DURING OR PROC  Narrative: COMPLIANCE ONLY    INDICATION: surgery    FINDINGS:   Portable imaging obtained and fluoroscopy utilized during procedure. Impression: Intraoperative views as above. See operative report for details.        A/P:  Procedure: Procedure(s):  PERCUTANEOUS SCREWS OF LEFT HIP  Post Op day: 1 Day Post-Op    - DVT ppx - resume Eliquis; SCDs  - PT/OT - WBAT LLE  - Pain - Acetaminophen, Tramadol; Ice, Elevation, Ace wrap as needed  - Dressing - Leave in place if it remains dry and intact x 1 week; change as needed for saturation with dry sterile island dressing  - Dispo - Pending PT/OT recs, likely home with home health as she has assistance at home; needs f/u in 2 weeks with Dr. Angelina Tran, [place]  Orthopedic Trauma Service  [facility]

## 2022-04-02 NOTE — PROGRESS NOTES
Bedside and Verbal shift change report given to Mckayla Terry and Lester Berg (oncoming nurse) by Mickey Kelley (offgoing nurse). Report included the following information SBAR and Kardex.

## 2022-04-02 NOTE — PROGRESS NOTES
Problem: Self Care Deficits Care Plan (Adult)  Goal: *Acute Goals and Plan of Care (Insert Text)  Description: FUNCTIONAL STATUS PRIOR TO ADMISSION: Patient was modified independent using HHA/contact guard assist for functional mobility. Pt receives assistance for all ADLs from paid in home caregivers M-F 9-5. HOME SUPPORT: The patient lived in mother-in-law suite on daughter's property and required max to total assistance for all ADLs    Occupational Therapy Goals  Initiated 4/2/2022  1. Patient will perform grooming with modified independence within 7 day(s). 2.  Patient will perform upper body dressing with minimal assistance within 7 day(s). 3.  Patient will perform BSC transfers with moderate assistance within 7 day(s). 4. Pt will perform all aspects of toileting with moderate assistance within 7 day(s). Outcome: Not Met   OCCUPATIONAL THERAPY EVALUATION  Patient: Nereida Tan (65 y.o. female)  Date: 4/2/2022  Primary Diagnosis: Hip fracture ([facility] [S72.009A]  Procedure(s) (LRB):  PERCUTANEOUS SCREWS OF LEFT HIP (Left) 1 Day Post-Op   Precautions: fall, WBAT      ASSESSMENT  Based on the objective data described below, the patient presents with impaired balance, generalized weakness, pain, and decreased activity tolerance s/p L hip percutaneous screw fixation d/t non-displaced femoral neck fracture from GLF, POD1. Pt received supine and agreeable to participation in therapy session. Pt performed bed mobility and functional transfers with mod to max A this session. While seated EOB, pt demonstrated basic grooming task with set-up assist. Following, toileting completed with maximal assistance. Pt returned to supine and positioned for comfort. In discussion with pt's caregiver at bedside, family would prefer for pt to return home. If plan is to d/c home she will require physical assistance for all bed mobility/functional transfers and ADL/IADL tasks.  Pt could benefit from d/c to SNF to maximize independence as she remains well below her functional baseline. Current Level of Function Impacting Discharge (ADLs/self-care): max A toileting, mod to max A bed mobility/functional transfers     Functional Outcome Measure: The patient scored Total: 25/100 on the Barthel Index outcome measure which is indicative of being very dependent in basic self-care. Other factors to consider for discharge: PLOF, paid caregivers, family prefers to return home      Patient will benefit from skilled therapy intervention to address the above noted impairments. PLAN :  Recommendations and Planned Interventions: self care training, functional mobility training, therapeutic exercise, balance training, therapeutic activities, endurance activities, patient education and home safety training    Frequency/Duration: Patient will be followed by occupational therapy 3 times a week to address goals. Recommendation for discharge: (in order for the patient to meet his/her long term goals)  To be determined: pending progress SNF vs home with continued assistance. If returning home will require 24/7 physical assistance and supervision for bed mobility/functional transfers/ADL/IADL tasks    This discharge recommendation:  Has not yet been discussed the attending provider and/or case management    IF patient discharges home will need the following DME: hospital bed and transfer bench       SUBJECTIVE:   Patient stated I'm cold.     OBJECTIVE DATA SUMMARY:   HISTORY:   History reviewed. No pertinent past medical history. History reviewed. No pertinent surgical history.     Expanded or extensive additional review of patient history:     Home Situation  Home Environment: Private residence  # Steps to Enter: 3  Rails to Enter: Yes  One/Two Story Residence: One story  Living Alone: No  Support Systems: Caregiver/Home Care Staff,Child(barry)  Patient Expects to be Discharged to[de-identified] Home with home health  Current DME Used/Available at Home: [place] , rollator,Walker, rolling,Wheelchair,Shower chair,Raised toilet seat,Grab bars,Commode, bedside  Tub or Shower Type: Tub/Shower combination        EXAMINATION OF PERFORMANCE DEFICITS:  Cognitive/Behavioral Status:  Neurologic State: Alert  Orientation Level: Oriented to person;Disoriented to time;Disoriented to situation;Disoriented to place  Cognition: Follows commands;Decreased attention/concentration  Perception: Appears intact  Perseveration: No perseveration noted  Safety/Judgement: Decreased awareness of environment    Skin: visually intact     Edema: none noted     Hearing: Auditory  Auditory Impairment: Hard of hearing, bilateral,Hearing aid(s)    Vision/Perceptual:                           Acuity: Within Defined Limits;Able to read clock/calendar on wall without difficulty    Corrective Lenses: Glasses    Range of Motion:    AROM: Generally decreased, functional  PROM: Generally decreased, functional                      Strength:    Strength: Generally decreased, functional                Coordination:  Coordination: Generally decreased, functional  Fine Motor Skills-Upper: Left Intact; Right Intact    Gross Motor Skills-Upper: Left Impaired;Right Impaired (generally decreased but functional)    Tone & Sensation:    Tone: Normal  Sensation: Intact                      Balance:  Sitting: Impaired; With support  Sitting - Static: Fair (occasional)  Sitting - Dynamic: Not tested  Standing: Impaired; With support  Standing - Static: Constant support; Fair  Standing - Dynamic : Constant support;Poor    Functional Mobility and Transfers for ADLs:  Bed Mobility:  Supine to Sit: Bed Modified; Moderate assistance  Sit to Supine: Maximum assistance  Scooting: Minimum assistance    Transfers:  Sit to Stand: Minimum assistance  Stand to Sit: Maximum assistance  Bed to Chair: Maximum assistance  Toilet Transfer : Maximum assistance; Additional time [facility])    ADL Assessment:  Feeding: Setup    Oral Facial Hygiene/Grooming: Minimum assistance    Bathing: Maximum assistance (for transfer and LB reach)    Upper Body Dressing: Minimum assistance    Lower Body Dressing: Maximum assistance    Toileting: Maximum assistance (for transfer and hygiene)                ADL Intervention and task modifications:                           Lower Body Dressing Assistance  Socks: Maximum assistance    Toileting  Toileting Assistance: Maximum assistance  Bladder Hygiene: Maximum assistance  Bowel Hygiene: Maximum assistance  Clothing Management: Maximum assistance  Cues: Verbal cues provided;Physical assistance for pants down;Physical assistance for pants up    Cognitive Retraining  Safety/Judgement: Decreased awareness of environment      Functional Measure:    Barthel Index:  Bathin  Bladder: 0  Bowels: 10  Groomin  Dressin  Feedin  Mobility: 0  Stairs: 0  Toilet Use: 0  Transfer (Bed to Chair and Back): 5  Total: 25/100      The Barthel ADL Index: Guidelines  1. The index should be used as a record of what a patient does, not as a record of what a patient could do. 2. The main aim is to establish degree of independence from any help, physical or verbal, however minor and for whatever reason. 3. The need for supervision renders the patient not independent. 4. A patient's performance should be established using the best available evidence. Asking the patient, friends/relatives and nurses are the usual sources, but direct observation and common sense are also important. However direct testing is not needed. 5. Usually the patient's performance over the preceding 24-48 hours is important, but occasionally longer periods will be relevant. 6. Middle categories imply that the patient supplies over 50 per cent of the effort. 7. Use of aids to be independent is allowed.     Score Interpretation (from [address] [place] [address])    Independent   60-79 Minimally independent   40-59 Partially dependent   20-39 Very dependent   <20 Totally dependent     -Franco Bronson, Barthel, D.W. (1733). Functional evaluation: the Barthel Index. [address] ([address] (1997). The Barthel activities of daily living index: self-reporting versus actual performance in the old (> or = 75 years). Journal of [facility]4 [facility] 45(7), 14 [facility], JASVIR, Yanna Ramirez., Ruiz Thakur (1999). Measuring the change in disability after inpatient rehabilitation; comparison of the responsiveness of the Barthel Index and Functional Raymond Measure. Journal of Neurology, Neurosurgery, and Psychiatry, 66(4), 731-840. RASHAAD Menendez, SHARATH Stewart, & Violet Alcocer MADAM. (2004) Assessment of post-stroke quality of life in cost-effectiveness studies: The usefulness of the Barthel Index and the EuroQoL-5D. Quality of Life Research, 15, 461-68     Occupational Therapy Evaluation Charge Determination   History Examination Decision-Making   LOW Complexity : Brief history review  LOW Complexity : 1-3 performance deficits relating to physical, cognitive , or psychosocial skils that result in activity limitations and / or participation restrictions  MEDIUM Complexity : Patient may present with comorbidities that affect occupational performnce.  Miniml to moderate modification of tasks or assistance (eg, physical or verbal ) with assesment(s) is necessary to enable patient to complete evaluation       Based on the above components, the patient evaluation is determined to be of the following complexity level: LOW   Pain Rating:  Pt expressing pain in L hip but did not rate     Activity Tolerance:   Fair    After treatment patient left in no apparent distress:    Supine in bed, Call bell within reach, Bed / chair alarm activated, Caregiver / family present and Side rails x 3    COMMUNICATION/EDUCATION:   The patients plan of care was discussed with: Physical therapist and Registered nurse. Patient/family have participated as able in goal setting and plan of care. and Patient/family agree to work toward stated goals and plan of care. This patients plan of care is appropriate for delegation to ANTON.     Thank you for this referral.  Chalmer Sacks, OT  Time Calculation: 28 mins

## 2022-04-02 NOTE — PROGRESS NOTES
Bedside and Verbal shift change report given to Kirti Ramirez RN. Report included the following information SBAR, Kardex, ED Summary, OR Summary, Procedure Summary, MAR, Recent Results and Med Rec Status.

## 2022-04-02 NOTE — PROGRESS NOTES
Problem: Mobility Impaired (Adult and Pediatric)  Goal: *Acute Goals and Plan of Care (Insert Text)  Description: FUNCTIONAL STATUS PRIOR TO ADMISSION: Patient was modified independent using a HHA or contact guard assistance for functional mobility. HOME SUPPORT PRIOR TO ADMISSION: The patient lives with her daughter and son in law. She has a caregiver who assists her M-F 9-5. Physical Therapy Goals  Initiated 4/1/2022    1. Patient will move from supine to sit and sit to supine  in bed with minimal assistance/contact guard assist within 4 days. 2. Patient will perform sit to stand with minimal assistance/contact guard assist within 4 days. 3. Patient will ambulate with minimal assistance/contact guard assist for 25 feet with the least restrictive device within 4 days. 4. Patient will ascend/descend 3 stairs with 1 handrail(s) with minimal assistance/contact guard assist within 4 days. 5. Patient will verbalize and demonstrate understanding of weight bearing as tolerated precautions per protocol within 4 days. Outcome: Progressing Towards Goal   PHYSICAL THERAPY TREATMENT  Patient: Nereida Tan (5 y.o. female)  Date: 4/2/2022  Diagnosis: Hip fracture ([address] Crystal Peacock <principal problem not specified>  Procedure(s) (LRB):  PERCUTANEOUS SCREWS OF LEFT HIP (Left) 1 Day Post-Op  Precautions:    Chart, physical therapy assessment, plan of care and goals were reviewed. ASSESSMENT  Patient continues with skilled PT services and is progressing towards goals. She demonstrates the need for decreased assistance for supine to sit with HOB elevated. Per caregiver patient has high bed that is not adjustable at home. Patient is able to transition supine to sit with Mod A and verbal cues for use of bed rails and squaring hips at the EOB. Patient demonstrates fair sitting balance EOB. VC are provided stand pivot transfer to [facility].  Patient demonstrates poor ability to weight shift and continues to require Max A for pivot transfers. She is able to performs sit to stand from [facility] with bilateral UE support and maintain standing balance with Min A for total A hygiene. Current Level of Function Impacting Discharge (mobility/balance): Min A     Other factors to consider for discharge: medical stability, decreased balance, decreased independence, decreased tolerance for weight bearing          PLAN :  Patient continues to benefit from skilled intervention to address the above impairments. Continue treatment per established plan of care. to address goals. Recommendation for discharge: (in order for the patient to meet his/her long term goals)  To be determined: pending progress with acute care    This discharge recommendation:  Has been made in collaboration with the attending provider and/or case management    IF patient discharges home will need the following DME: hospital bed       SUBJECTIVE:   Patient stated I hate being cold.     OBJECTIVE DATA SUMMARY:   Critical Behavior:  Neurologic State: Alert  Orientation Level: Oriented to person,Disoriented to place,Disoriented to situation,Disoriented to time  Cognition: Follows commands,Impaired decision making     Functional Mobility Training:  Bed Mobility:     Supine to Sit: Bed Modified; Moderate assistance  Sit to Supine: Maximum assistance  Scooting: Minimum assistance        Transfers:  Sit to Stand: Minimum assistance  Stand to Sit: Maximum assistance        Bed to Chair: Maximum assistance                    Balance:  Sitting: Impaired; With support  Sitting - Static: Fair (occasional)  Sitting - Dynamic: Not tested  Standing: Impaired; With support  Standing - Static: Constant support; Fair  Standing - Dynamic : Constant support;Poor  Ambulation/Gait Training:                                                        Stairs:               Therapeutic Exercises:     Pain Rating:      Activity Tolerance:   Fair    After treatment patient left in no apparent distress: Supine in bed, Call bell within reach, Bed / chair alarm activated, Caregiver / family present, and Side rails x 3    COMMUNICATION/COLLABORATION:   The patients plan of care was discussed with: Occupational therapist and Registered nurse.      Ruben Lin PT   Time Calculation: 30 mins

## 2022-04-02 NOTE — PROGRESS NOTES
[facility]  Hospitalist Group                                                                                          Hospitalist Progress Note  Jeovanny Juares NP  Answering service: [phone] OR [phone] from in house phone        Date of Service:  2022  NAME:  Jude Garay  :  1926  MRN:  [id number]      Admission Summary:   66-year-old female who sustained a fall at home. She has a past medical history significant for aphasia, chronic systolic congestive heart failure. She has a history of atrial fibrillation for which she takes apixaban. Work-up in the emergency department revealed a left nondisplaced femoral fracture. Patient had reportedly fallen a couple of days prior to presentation and was noted to be dragging her leg when walking  Interval history / Subjective:       Patient seen on morning rounds. No complaints this morning. Denies pain, fever, chills. Caretaker at the bedside at that time.      Assessment & Plan:     Left nondisplaced femoral fracture  Postop day 1 percutaneous screw fixation  Orthopedics following, thank you for your assistance  Physical therapy to evaluate  Patient cleared by Ortho, restarting apixaban  Continuing multimodal pain control      Chronic systolic congestive heart failure  Stable, not in exacerbation  Lungs clear  Continue furosemide        Hypertension  Blood pressure currently stable  Continue furosemide    Atrial fibrillation  Heart rate currently controlled  Apixaban    Code status: Full  DVT prophylaxis:apixaban    Care Plan discussed with: Patient/Family and Nurse  Anticipated Disposition: tbd, working with physical therapy  Anticipated Discharge: 24 hours to 48 hours     Hospital Problems  Date Reviewed: 2022          Codes Class Noted POA    Atherosclerotic heart disease of native coronary artery without angina pectoris ICD-10-CM: I25.10  ICD-9-CM: 414.01  2022 Yes        Benign essential hypertension ICD-10-CM: I10  ICD-9-CM: 401.1  4/1/2022 Yes        Chronic a-fib (HCC) ICD-10-CM: I48.20  ICD-9-CM: 427.31  4/1/2022 Yes        Hyperlipidemia ICD-10-CM: E78.5  ICD-9-CM: 272.4  4/1/2022 Yes        Stage 3b chronic kidney disease ([email or web address] ICD-10-CM: N18.32  ICD-9-CM: 585.3  4/1/2022 Yes        Chronic combined systolic and diastolic heart failure ([facility] [[email or web address].) ICD-10-CM: I50.42  ICD-9-CM: 428.42  4/1/2022 Yes        Hip fracture ([facility] [[email or web address] 75.) ICD-10-CM: S72.009A  ICD-9-CM: 820.8  3/31/2022 Unknown        Chronic CHF (HCC) (Chronic) ICD-10-CM: I50.9  ICD-9-CM: 428.0  2/16/2021 Yes        Aphasia ICD-10-CM: R47.01  ICD-9-CM: 784.3  2/13/2021 Yes                Review of Systems:   A comprehensive review of systems was negative except for that written in the HPI. Vital Signs:    Last 24hrs VS reviewed since prior progress note. Most recent are:  Visit Vitals  BP (!) 100/53 (BP 1 Location: Right upper arm, BP Patient Position: Lying)   Pulse 60   Temp (!) 96.7 °F (35.9 °C)   Resp 16   Ht 5' 2\" (1.575 m)   Wt 44.9 kg (98 lb 14.4 oz)   SpO2 93%   BMI 18.09 kg/m²         Intake/Output Summary (Last 24 hours) at 4/2/2022 1447  Last data filed at 4/2/2022 0700  Gross per 24 hour   Intake --   Output 475 ml   Net -475 ml        Physical Examination:     I had a face to face encounter with this patient and independently examined them on 4/2/2022 as outlined below:          Constitutional:  No acute distress, cooperative, pleasant    ENT:  Oral mucosa moist, oropharynx benign. Resp:  CTA bilaterally. No wheezing/rhonchi/rales. No accessory muscle use   CV:  Regular rhythm, normal rate, no murmurs, gallops, rubs    GI:  Soft, non distended, non tender. normoactive bowel sounds, no hepatosplenomegaly     Musculoskeletal:  No edema, warm, 2+ pulses throughout    Neurologic:  Moves all extremities.   AAOx3, CN II-XII reviewed            Data Review:    Review and/or order of clinical lab test  Review and/or order of tests in the radiology section of CPT      Labs:     Recent Labs     04/02/22  0357 03/31/22  1700   WBC 6.6 6.9   HGB 11.1* 14.2   HCT 35.3 42.6    177     Recent Labs     04/02/22  0357 03/31/22  1700    137   K 4.0 3.2*    104   CO2 28 23   BUN 28* 28*   CREA 1.81* 1.64*   * 91   CA 8.2* 9.3     Recent Labs     04/02/22  0357 03/31/22  1700   ALT 11* 44   AP 91 118*   TBILI 0.3 0.9   TP 5.9* 7.5   ALB 2.9* 3.8   GLOB 3.0 3.7     Recent Labs     03/31/22  1700   INR 1.1   PTP 11.0      No results for input(s): FE, TIBC, PSAT, FERR in the last 72 hours. No results found for: FOL, RBCF   No results for input(s): PH, PCO2, PO2 in the last 72 hours. No results for input(s): CPK, CKNDX, TROIQ in the last 72 hours.     No lab exists for component: CPKMB  Lab Results   Component Value Date/Time    Cholesterol, total 178 02/13/2021 01:22 PM    HDL Cholesterol 48 02/13/2021 01:22 PM    LDL, calculated 91 02/13/2021 01:22 PM    Triglyceride 195 (H) 02/13/2021 01:22 PM    CHOL/HDL Ratio 3.7 02/13/2021 01:22 PM     No results found for: [facility]  Lab Results   Component Value Date/Time    Color YELLOW/STRAW 03/31/2022 07:28 PM    Appearance CLEAR 03/31/2022 07:28 PM    Specific gravity 1.016 03/31/2022 07:28 PM    pH (UA) 5.5 03/31/2022 07:28 PM    Protein 30 (A) 03/31/2022 07:28 PM    Glucose Negative 03/31/2022 07:28 PM    Ketone Negative 03/31/2022 07:28 PM    Bilirubin Negative 03/31/2022 07:28 PM    Urobilinogen 0.2 03/31/2022 07:28 PM    Nitrites Negative 03/31/2022 07:28 PM    Leukocyte Esterase SMALL (A) 03/31/2022 07:28 PM    Epithelial cells FEW 03/31/2022 07:28 PM    Bacteria Negative 03/31/2022 07:28 PM    WBC 20-50 03/31/2022 07:28 PM    RBC 0-5 03/31/2022 07:28 PM         Medications Reviewed:     Current Facility-Administered Medications   Medication Dose Route Frequency    lidocaine (PF) (XYLOCAINE) 10 mg/mL (1 %) injection 0.1 mL  0.1 mL SubCUTAneous PRN    ropivacaine (PF) (NAROPIN) 5 mg/mL (0.5 %) injection 30 mL  30 mL Peripheral Nerve Block PRN    HYDROmorphone (DILAUDID) injection 0.2 mg  0.2 mg IntraVENous Multiple    albuterol (PROVENTIL VENTOLIN) nebulizer solution 2.5 mg  2.5 mg Inhalation PRN    ondansetron (ZOFRAN) injection 4 mg  4 mg IntraVENous PRN    sodium chloride (NS) flush 5-40 mL  5-40 mL IntraVENous Q8H    sodium chloride (NS) flush 5-40 mL  5-40 mL IntraVENous PRN    naloxone (NARCAN) injection 0.4 mg  0.4 mg IntraVENous PRN    calcium-vitamin D (OS-GIRISH +D3) 500 mg-200 unit per tablet 1 Tablet  1 Tablet Oral TID WITH MEALS    senna-docusate (PERICOLACE) 8.6-50 mg per tablet 1 Tablet  1 Tablet Oral BID    polyethylene glycol (MIRALAX) packet 17 g  17 g Oral DAILY    [START ON 4/3/2022] bisacodyL (DULCOLAX) suppository 10 mg  10 mg Rectal DAILY PRN    traMADoL (ULTRAM) tablet 50 mg  50 mg Oral Q6H PRN    oxyCODONE IR (ROXICODONE) tablet 5 mg  5 mg Oral Q4H PRN    apixaban (ELIQUIS) tablet 2.5 mg  2.5 mg Oral BID    gabapentin (NEURONTIN) capsule 100 mg  100 mg Oral BID    atorvastatin (LIPITOR) tablet 20 mg  20 mg Oral QHS    aspirin delayed-release tablet 81 mg  81 mg Oral DAILY    mirtazapine (REMERON) tablet 7.5 mg  7.5 mg Oral QHS    furosemide (LASIX) tablet 20 mg  20 mg Oral DAILY     ______________________________________________________________________  EXPECTED LENGTH OF STAY: - - -  ACTUAL LENGTH OF STAY:          2                 Nina Blum NP

## 2022-04-03 ENCOUNTER — APPOINTMENT (OUTPATIENT)
Dept: GENERAL RADIOLOGY | Age: 87
DRG: 481 | End: 2022-04-03
Attending: PHYSICIAN ASSISTANT
Payer: MEDICARE

## 2022-04-03 LAB
ALBUMIN SERPL-MCNC: 2.9 G/DL (ref 3.5–5)
ALBUMIN/GLOB SERPL: 0.9 {RATIO} (ref 1.1–2.2)
ALP SERPL-CCNC: 130 U/L (ref 45–117)
ALT SERPL-CCNC: 14 U/L (ref 12–78)
ANION GAP SERPL CALC-SCNC: 4 MMOL/L (ref 5–15)
AST SERPL-CCNC: 36 U/L (ref 15–37)
BASOPHILS # BLD: 0 K/UL (ref 0–0.1)
BASOPHILS NFR BLD: 0 % (ref 0–1)
BILIRUB SERPL-MCNC: 0.4 MG/DL (ref 0.2–1)
BUN SERPL-MCNC: 32 MG/DL (ref 6–20)
BUN/CREAT SERPL: 19 (ref 12–20)
CALCIUM SERPL-MCNC: 8.9 MG/DL (ref 8.5–10.1)
CHLORIDE SERPL-SCNC: 104 MMOL/L (ref 97–108)
CO2 SERPL-SCNC: 30 MMOL/L (ref 21–32)
CREAT SERPL-MCNC: 1.67 MG/DL (ref 0.55–1.02)
DIFFERENTIAL METHOD BLD: ABNORMAL
EOSINOPHIL # BLD: 0.1 K/UL (ref 0–0.4)
EOSINOPHIL NFR BLD: 1 % (ref 0–7)
ERYTHROCYTE [DISTWIDTH] IN BLOOD BY AUTOMATED COUNT: 14.7 % (ref 11.5–14.5)
GLOBULIN SER CALC-MCNC: 3.2 G/DL (ref 2–4)
GLUCOSE SERPL-MCNC: 98 MG/DL (ref 65–100)
HCT VFR BLD AUTO: 35.7 % (ref 35–47)
HGB BLD-MCNC: 11.5 G/DL (ref 11.5–16)
IMM GRANULOCYTES # BLD AUTO: 0 K/UL (ref 0–0.04)
IMM GRANULOCYTES NFR BLD AUTO: 0 % (ref 0–0.5)
LYMPHOCYTES # BLD: 1.9 K/UL (ref 0.8–3.5)
LYMPHOCYTES NFR BLD: 29 % (ref 12–49)
MCH RBC QN AUTO: 31.1 PG (ref 26–34)
MCHC RBC AUTO-ENTMCNC: 32.2 G/DL (ref 30–36.5)
MCV RBC AUTO: 96.5 FL (ref 80–99)
MONOCYTES # BLD: 0.7 K/UL (ref 0–1)
MONOCYTES NFR BLD: 11 % (ref 5–13)
NEUTS SEG # BLD: 3.7 K/UL (ref 1.8–8)
NEUTS SEG NFR BLD: 58 % (ref 32–75)
NRBC # BLD: 0 K/UL (ref 0–0.01)
NRBC BLD-RTO: 0 PER 100 WBC
PLATELET # BLD AUTO: 146 K/UL (ref 150–400)
PMV BLD AUTO: 11.4 FL (ref 8.9–12.9)
POTASSIUM SERPL-SCNC: 3.3 MMOL/L (ref 3.5–5.1)
PROT SERPL-MCNC: 6.1 G/DL (ref 6.4–8.2)
RBC # BLD AUTO: 3.7 M/UL (ref 3.8–5.2)
SODIUM SERPL-SCNC: 138 MMOL/L (ref 136–145)
WBC # BLD AUTO: 6.4 K/UL (ref 3.6–11)

## 2022-04-03 PROCEDURE — 77030037877 HC DRSG MEPILEX >48IN BORD MOLN -A

## 2022-04-03 PROCEDURE — 74011250637 HC RX REV CODE- 250/637: Performed by: NURSE PRACTITIONER

## 2022-04-03 PROCEDURE — 74011250637 HC RX REV CODE- 250/637: Performed by: STUDENT IN AN ORGANIZED HEALTH CARE EDUCATION/TRAINING PROGRAM

## 2022-04-03 PROCEDURE — 97116 GAIT TRAINING THERAPY: CPT

## 2022-04-03 PROCEDURE — 80053 COMPREHEN METABOLIC PANEL: CPT

## 2022-04-03 PROCEDURE — 73502 X-RAY EXAM HIP UNI 2-3 VIEWS: CPT

## 2022-04-03 PROCEDURE — 36415 COLL VENOUS BLD VENIPUNCTURE: CPT

## 2022-04-03 PROCEDURE — 74011000250 HC RX REV CODE- 250: Performed by: STUDENT IN AN ORGANIZED HEALTH CARE EDUCATION/TRAINING PROGRAM

## 2022-04-03 PROCEDURE — 85025 COMPLETE CBC W/AUTO DIFF WBC: CPT

## 2022-04-03 PROCEDURE — 97530 THERAPEUTIC ACTIVITIES: CPT

## 2022-04-03 PROCEDURE — 65270000029 HC RM PRIVATE

## 2022-04-03 RX ORDER — POTASSIUM CHLORIDE 750 MG/1
40 TABLET, FILM COATED, EXTENDED RELEASE ORAL
Status: COMPLETED | OUTPATIENT
Start: 2022-04-03 | End: 2022-04-03

## 2022-04-03 RX ADMIN — SODIUM CHLORIDE, PRESERVATIVE FREE 10 ML: 5 INJECTION INTRAVENOUS at 22:31

## 2022-04-03 RX ADMIN — APIXABAN 2.5 MG: 2.5 TABLET, FILM COATED ORAL at 09:52

## 2022-04-03 RX ADMIN — FUROSEMIDE 20 MG: 20 TABLET ORAL at 09:52

## 2022-04-03 RX ADMIN — OYSTER SHELL CALCIUM WITH VITAMIN D 1 TABLET: 500; 200 TABLET, FILM COATED ORAL at 17:47

## 2022-04-03 RX ADMIN — SODIUM CHLORIDE, PRESERVATIVE FREE 10 ML: 5 INJECTION INTRAVENOUS at 07:45

## 2022-04-03 RX ADMIN — ATORVASTATIN CALCIUM 20 MG: 10 TABLET, FILM COATED ORAL at 22:26

## 2022-04-03 RX ADMIN — SENNOSIDES AND DOCUSATE SODIUM 1 TABLET: 50; 8.6 TABLET ORAL at 09:52

## 2022-04-03 RX ADMIN — MIRTAZAPINE 7.5 MG: 15 TABLET ORAL at 22:25

## 2022-04-03 RX ADMIN — OYSTER SHELL CALCIUM WITH VITAMIN D 1 TABLET: 500; 200 TABLET, FILM COATED ORAL at 13:36

## 2022-04-03 RX ADMIN — OYSTER SHELL CALCIUM WITH VITAMIN D 1 TABLET: 500; 200 TABLET, FILM COATED ORAL at 09:52

## 2022-04-03 RX ADMIN — SENNOSIDES AND DOCUSATE SODIUM 1 TABLET: 50; 8.6 TABLET ORAL at 17:47

## 2022-04-03 RX ADMIN — POTASSIUM CHLORIDE 40 MEQ: 750 TABLET, EXTENDED RELEASE ORAL at 10:01

## 2022-04-03 RX ADMIN — TRAMADOL HYDROCHLORIDE 50 MG: 50 TABLET, COATED ORAL at 10:01

## 2022-04-03 RX ADMIN — APIXABAN 2.5 MG: 2.5 TABLET, FILM COATED ORAL at 17:47

## 2022-04-03 RX ADMIN — SODIUM CHLORIDE, PRESERVATIVE FREE 10 ML: 5 INJECTION INTRAVENOUS at 17:47

## 2022-04-03 RX ADMIN — GABAPENTIN 100 MG: 100 CAPSULE ORAL at 17:47

## 2022-04-03 RX ADMIN — TRAMADOL HYDROCHLORIDE 50 MG: 50 TABLET, COATED ORAL at 22:48

## 2022-04-03 RX ADMIN — ASPIRIN 81 MG: 81 TABLET, COATED ORAL at 09:52

## 2022-04-03 RX ADMIN — GABAPENTIN 100 MG: 100 CAPSULE ORAL at 10:03

## 2022-04-03 RX ADMIN — OXYCODONE 5 MG: 5 TABLET ORAL at 13:35

## 2022-04-03 NOTE — PROGRESS NOTES
[facility] [facility] Adult  Hospitalist Group                                                                                          Hospitalist Progress Note  Elen Ruiz NP  Answering service: [phone] from in house phone        Date of Service:  4/3/2022  NAME:  Herrera Wong  :  1926  MRN:  [id number]      Admission Summary:   80-year-old female who sustained a fall at home. She has a past medical history significant for aphasia, chronic systolic congestive heart failure. She has a history of atrial fibrillation for which she takes apixaban. Work-up in the emergency department revealed a left nondisplaced femoral fracture. Patient had reportedly fallen a couple of days prior to presentation and was noted to be dragging her leg when walking  Interval history / Subjective:       I saw the patient this morning on rounds. Patient does endorse pain, has not received analgesia since yesterday at noon. Otherwise no complaint.   Caretaker at the bedside at the moment of the encounter     Assessment & Plan:     Left nondisplaced femoral fracture  Postop day 2 percutaneous screw fixation  Orthopedics following, thank you for your assistance  Continuing multimodal pain control  Working with physical therapy, final disposition yet to be determined      Chronic systolic congestive heart failure  Stable, not in exacerbation, lungs clear  Continue furosemide            Hypertension  Blood pressure currently controlled  Continue furosemide    Atrial fibrillation  Heart rate currently controlled  Apixaban    Code status: Full    DVT prophylaxis:apixaban    Care Plan discussed with: Patient/Family and Nurse     Anticipated Disposition: Yet to be determined, working with physical therapy,     Anticipated Discharge: 24 hours to 48 hours     Hospital Problems  Date Reviewed: 2022          Codes Class Noted POA    Atherosclerotic heart disease of native coronary artery without angina pectoris ICD-10-CM: I25.10  ICD-9-CM: 414.01  4/1/2022 Yes        Benign essential hypertension ICD-10-CM: I10  ICD-9-CM: 401.1  4/1/2022 Yes        Chronic a-fib (HCC) ICD-10-CM: I48.20  ICD-9-CM: 427.31  4/1/2022 Yes        Hyperlipidemia ICD-10-CM: E78.5  ICD-9-CM: 272.4  4/1/2022 Yes        Stage 3b chronic kidney disease ([facility] ICD-10-CM: N18.32  ICD-9-CM: 585.3  4/1/2022 Yes        Chronic combined systolic and diastolic heart failure ([facility] ICD-10-CM: I50.42  ICD-9-CM: 428.42  4/1/2022 Yes        Hip fracture ([facility] ICD-10-CM: S72.009A  ICD-9-CM: 820.8  3/31/2022 Unknown        Chronic CHF ([facility] (Chronic) ICD-10-CM: I50.9  ICD-9-CM: 428.0  2/16/2021 Yes        Aphasia ICD-10-CM: R47.01  ICD-9-CM: 784.3  2/13/2021 Yes                Review of Systems:   A comprehensive review of systems was negative except for that written in the HPI. Vital Signs:    Last 24hrs VS reviewed since prior progress note. Most recent are:  Visit Vitals  BP (!) 149/73 (BP 1 Location: Right upper arm)   Pulse 66   Temp 98.4 °F (36.9 °C)   Resp 18   Ht 5' 2\" (1.575 m)   Wt 47.3 kg (104 lb 3.2 oz)   SpO2 93%   BMI 19.06 kg/m²       No intake or output data in the 24 hours ending 04/03/22 1003     Physical Examination:     I had a face to face encounter with this patient and independently examined them on 4/3/2022 as outlined below:          Constitutional:  No acute distress, cooperative, pleasant    ENT:  Oral mucosa moist, oropharynx benign. Resp:  CTA bilaterally. No wheezing/rhonchi/rales. No accessory muscle use   CV:  Regular rhythm, normal rate, no murmurs, gallops, rubs    GI:  Soft, non distended, non tender. normoactive bowel sounds, no hepatosplenomegaly     Musculoskeletal:  No edema, warm, 2+ pulses throughout    Neurologic:  Moves all extremities.   AAOx3, CN II-XII reviewed            Data Review:    Review and/or order of clinical lab test  Review and/or order of tests in the radiology section of CPT      Labs:     Recent Labs     04/03/22 0428 04/02/22  0357   WBC 6.4 6.6   HGB 11.5 11.1*   HCT 35.7 35.3   * 157     Recent Labs     04/03/22  0428 04/02/22  0357 03/31/22  1700    138 137   K 3.3* 4.0 3.2*    105 104   CO2 30 28 23   BUN 32* 28* 28*   CREA 1.67* 1.81* 1.64*   GLU 98 125* 91   CA 8.9 8.2* 9.3     Recent Labs     04/03/22 0428 04/02/22 0357 03/31/22  1700   ALT 14 11* 44   * 91 118*   TBILI 0.4 0.3 0.9   TP 6.1* 5.9* 7.5   ALB 2.9* 2.9* 3.8   GLOB 3.2 3.0 3.7     Recent Labs     03/31/22  1700   INR 1.1   PTP 11.0      No results for input(s): FE, TIBC, PSAT, FERR in the last 72 hours. No results found for: FOL, RBCF   No results for input(s): PH, PCO2, PO2 in the last 72 hours. No results for input(s): CPK, CKNDX, TROIQ in the last 72 hours.     No lab exists for component: CPKMB  Lab Results   Component Value Date/Time    Cholesterol, total 178 02/13/2021 01:22 PM    HDL Cholesterol 48 02/13/2021 01:22 PM    LDL, calculated 91 02/13/2021 01:22 PM    Triglyceride 195 (H) 02/13/2021 01:22 PM    CHOL/HDL Ratio 3.7 02/13/2021 01:22 PM     No results found for: [facility]  Lab Results   Component Value Date/Time    Color YELLOW/STRAW 03/31/2022 07:28 PM    Appearance CLEAR 03/31/2022 07:28 PM    Specific gravity 1.016 03/31/2022 07:28 PM    pH (UA) 5.5 03/31/2022 07:28 PM    Protein 30 (A) 03/31/2022 07:28 PM    Glucose Negative 03/31/2022 07:28 PM    Ketone Negative 03/31/2022 07:28 PM    Bilirubin Negative 03/31/2022 07:28 PM    Urobilinogen 0.2 03/31/2022 07:28 PM    Nitrites Negative 03/31/2022 07:28 PM    Leukocyte Esterase SMALL (A) 03/31/2022 07:28 PM    Epithelial cells FEW 03/31/2022 07:28 PM    Bacteria Negative 03/31/2022 07:28 PM    WBC 20-50 03/31/2022 07:28 PM    RBC 0-5 03/31/2022 07:28 PM         Medications Reviewed:     Current Facility-Administered Medications   Medication Dose Route Frequency    lidocaine (PF) (XYLOCAINE) 10 mg/mL (1 %) injection 0.1 mL  0.1 mL SubCUTAneous PRN    ropivacaine (PF) (NAROPIN) 5 mg/mL (0.5 %) injection 30 mL  30 mL Peripheral Nerve Block PRN    HYDROmorphone (DILAUDID) injection 0.2 mg  0.2 mg IntraVENous Multiple    albuterol (PROVENTIL VENTOLIN) nebulizer solution 2.5 mg  2.5 mg Inhalation PRN    ondansetron (ZOFRAN) injection 4 mg  4 mg IntraVENous PRN    sodium chloride (NS) flush 5-40 mL  5-40 mL IntraVENous Q8H    sodium chloride (NS) flush 5-40 mL  5-40 mL IntraVENous PRN    naloxone (NARCAN) injection 0.4 mg  0.4 mg IntraVENous PRN    calcium-vitamin D (OS-GIRISH +D3) 500 mg-200 unit per tablet 1 Tablet  1 Tablet Oral TID WITH MEALS    senna-docusate (PERICOLACE) 8.6-50 mg per tablet 1 Tablet  1 Tablet Oral BID    polyethylene glycol (MIRALAX) packet 17 g  17 g Oral DAILY    bisacodyL (DULCOLAX) suppository 10 mg  10 mg Rectal DAILY PRN    traMADoL (ULTRAM) tablet 50 mg  50 mg Oral Q6H PRN    oxyCODONE IR (ROXICODONE) tablet 5 mg  5 mg Oral Q4H PRN    apixaban (ELIQUIS) tablet 2.5 mg  2.5 mg Oral BID    gabapentin (NEURONTIN) capsule 100 mg  100 mg Oral BID    atorvastatin (LIPITOR) tablet 20 mg  20 mg Oral QHS    aspirin delayed-release tablet 81 mg  81 mg Oral DAILY    mirtazapine (REMERON) tablet 7.5 mg  7.5 mg Oral QHS    furosemide (LASIX) tablet 20 mg  20 mg Oral DAILY     ______________________________________________________________________  EXPECTED LENGTH OF STAY: - - -  ACTUAL LENGTH OF STAY:          3                 Lauren Meth, NP

## 2022-04-03 NOTE — PROGRESS NOTES
Bedside and Verbal shift change report given to Isabela Mckoy RN by Maria Alejandra Esquivel RN Report included the following information SBAR, Kardex, ED Summary, OR Summary, Procedure Summary, MAR, Accordion, Recent Results and Med Rec Status.

## 2022-04-03 NOTE — PROGRESS NOTES
Problem: Falls - Risk of  Goal: *Absence of Falls  Description: Document Marsha Jimenez Fall Risk and appropriate interventions in the flowsheet.   Outcome: Progressing Towards Goal  Note: Fall Risk Interventions:       Mentation Interventions: Bed/chair exit alarm    Medication Interventions: Bed/chair exit alarm    Elimination Interventions: Bed/chair exit alarm    History of Falls Interventions: Bed/chair exit alarm

## 2022-04-03 NOTE — PROGRESS NOTES
Problem: Mobility Impaired (Adult and Pediatric)  Goal: *Acute Goals and Plan of Care (Insert Text)  Description: FUNCTIONAL STATUS PRIOR TO ADMISSION: Patient was modified independent using a HHA or contact guard assistance for functional mobility. HOME SUPPORT PRIOR TO ADMISSION: The patient lives with her daughter and son in law. She has a caregiver who assists her M-F 9-5. Physical Therapy Goals  Initiated 4/1/2022    1. Patient will move from supine to sit and sit to supine  in bed with minimal assistance/contact guard assist within 4 days. 2. Patient will perform sit to stand with minimal assistance/contact guard assist within 4 days. 3. Patient will ambulate with minimal assistance/contact guard assist for 25 feet with the least restrictive device within 4 days. 4. Patient will ascend/descend 3 stairs with 1 handrail(s) with minimal assistance/contact guard assist within 4 days. 5. Patient will verbalize and demonstrate understanding of weight bearing as tolerated precautions per protocol within 4 days. Outcome: Progressing Towards Goal    PHYSICAL THERAPY TREATMENT  Patient: Kb Kaplan (52 y.o. female)  Date: 4/3/2022  Diagnosis: Hip fracture ([address] Paulina Adams <principal problem not specified>  Procedure(s) (LRB):  PERCUTANEOUS SCREWS OF LEFT HIP (Left) 2 Days Post-Op  Precautions:    Chart, physical therapy assessment, plan of care and goals were reviewed. ASSESSMENT  Patient continues with skilled PT services and is progressing towards goals. Pt received supine in bed with caregiver in room, willing to work with therapy with motivation. Pt limited by pain with activity mobility in bed, decreased strength, independence, and balance this session. However pt was able tolerate LE ex with VC for form, followed by bed mobility to R side EOB with mod A. Pt continued with STS with this author in anterior position to pt with GB.  Pt continued with lateral steps to [facility] with VC for sequencing (dancing). Pt completed with max A back to supine, pad changed due to being saturated. Pts gown and brief saturated as well, RN notified by request. .     Current Level of Function Impacting Discharge (mobility/balance): mod/max A bed mobility mod A STS, min A amb 3' with lateral steps only    Other factors to consider for discharge: fall risk, cogitation impairments         PLAN :  Patient continues to benefit from skilled intervention to address the above impairments. Continue treatment per established plan of care. to address goals. Recommendation for discharge: (in order for the patient to meet his/her long term goals)  Therapy up to 5 days/week in SNF setting    This discharge recommendation:  Has not yet been discussed the attending provider and/or case management    IF patient discharges home will need the following DME: to be determined (TBD)       SUBJECTIVE:   Patient stated Ill dance with you anytime.     OBJECTIVE DATA SUMMARY:   Critical Behavior:  Neurologic State: Alert  Orientation Level: Oriented to person,Oriented to place  Cognition: Appropriate for age attention/concentration  Safety/Judgement: Decreased awareness of environment  Functional Mobility Training:  Bed Mobility:  Supine to Sit: Bed Modified; Moderate assistance  Sit to Supine: Maximum assistance  Scooting: Moderate assistance    Transfers:  Sit to Stand: Moderate assistance  Stand to Sit: Moderate assistance    Balance:  Sitting: Impaired  Sitting - Static: Fair (occasional)  Sitting - Dynamic: Fair (occasional)  Standing: Impaired; With support  Standing - Static: Constant support; Fair  Standing - Dynamic : Constant support; Fair    Ambulation/Gait Training:  Distance (ft): 3 Feet (ft) (lateral steps to [facility])  Assistive Device: Gait belt (anterior support )  Ambulation - Level of Assistance: Minimal assistance  Gait Abnormalities: Decreased step clearance  Base of Support: Widened  Step Length: Left shortened;Right shortened  Pain Rating:  Pt reported pain with any activity    Activity Tolerance:   Fair and Poor      After treatment patient left in no apparent distress:   Supine in bed, Call bell within reach, Bed / chair alarm activated, and Caregiver / family present    COMMUNICATION/COLLABORATION:   The patients plan of care was discussed with: Registered nurse.      Hortensia Cartwright PTA   Time Calculation: 23 mins

## 2022-04-03 NOTE — PROGRESS NOTES
Orthopedic Progress Note    S: Pain worse this morning, and last night per care giver; pt quiet this morning, sleeping; Pain localized to area of fracture/surgery, no new complaints. O: NAD, respirations unlabored; Dressings CDI; thigh soft, no edema, no posterior calf ttp; moves foot, SILT; Cap refill brisk, foot warm, DP 1+    Patient Vitals for the past 4 hrs:   Temp Pulse BP   04/03/22 0858 98.4 °F (36.9 °C) 66 (!) 149/73     Recent Labs     04/03/22  0428 04/02/22  0357   HGB 11.5 11.1*   HCT 35.7 35.3   * 157   BUN 32* 28*   CREA 1.67* 1.81*   K 3.3* 4.0    138         A/P:  Procedure: Procedure(s):  PERCUTANEOUS SCREWS OF LEFT HIP  Post Op day: 2 Days Post-Op    - Pain markedly worse, care givers concerned since she had been doing well. Will order hip films to confirm there is no complication, or other fracture.    - DVT ppx - resume Eliquis; SCDs  - PT/OT - WBAT LLE  - Pain - Acetaminophen, Tramadol, may need to use Oxycodone for therapy sparingly to not interfere with mentation; Ice, Elevation, Ace wrap as needed  - Dressing - Leave in place if it remains dry and intact x 1 week; change as needed for saturation with dry sterile island dressing  - Dispo - Pending PT/OT recs, likely home with home health as she has assistance at home; needs f/u in 2 weeks with Dr. Pastor Montague, [place]  Orthopedic Trauma Service  [address] [facility]

## 2022-04-04 LAB
ALBUMIN SERPL-MCNC: 2.6 G/DL (ref 3.5–5)
ALBUMIN/GLOB SERPL: 0.7 {RATIO} (ref 1.1–2.2)
ALP SERPL-CCNC: 148 U/L (ref 45–117)
ALT SERPL-CCNC: 14 U/L (ref 12–78)
ANION GAP SERPL CALC-SCNC: 3 MMOL/L (ref 5–15)
AST SERPL-CCNC: 25 U/L (ref 15–37)
BASOPHILS # BLD: 0 K/UL (ref 0–0.1)
BASOPHILS NFR BLD: 0 % (ref 0–1)
BILIRUB SERPL-MCNC: 0.5 MG/DL (ref 0.2–1)
BUN SERPL-MCNC: 32 MG/DL (ref 6–20)
BUN/CREAT SERPL: 24 (ref 12–20)
CALCIUM SERPL-MCNC: 8.9 MG/DL (ref 8.5–10.1)
CHLORIDE SERPL-SCNC: 105 MMOL/L (ref 97–108)
CO2 SERPL-SCNC: 30 MMOL/L (ref 21–32)
CREAT SERPL-MCNC: 1.34 MG/DL (ref 0.55–1.02)
DIFFERENTIAL METHOD BLD: ABNORMAL
EOSINOPHIL # BLD: 0.1 K/UL (ref 0–0.4)
EOSINOPHIL NFR BLD: 1 % (ref 0–7)
ERYTHROCYTE [DISTWIDTH] IN BLOOD BY AUTOMATED COUNT: 14.6 % (ref 11.5–14.5)
GLOBULIN SER CALC-MCNC: 3.6 G/DL (ref 2–4)
GLUCOSE SERPL-MCNC: 145 MG/DL (ref 65–100)
HCT VFR BLD AUTO: 35.7 % (ref 35–47)
HGB BLD-MCNC: 11.6 G/DL (ref 11.5–16)
IMM GRANULOCYTES # BLD AUTO: 0 K/UL (ref 0–0.04)
IMM GRANULOCYTES NFR BLD AUTO: 0 % (ref 0–0.5)
LYMPHOCYTES # BLD: 1.7 K/UL (ref 0.8–3.5)
LYMPHOCYTES NFR BLD: 25 % (ref 12–49)
MCH RBC QN AUTO: 31.2 PG (ref 26–34)
MCHC RBC AUTO-ENTMCNC: 32.5 G/DL (ref 30–36.5)
MCV RBC AUTO: 96 FL (ref 80–99)
MONOCYTES # BLD: 0.8 K/UL (ref 0–1)
MONOCYTES NFR BLD: 11 % (ref 5–13)
NEUTS SEG # BLD: 4.4 K/UL (ref 1.8–8)
NEUTS SEG NFR BLD: 63 % (ref 32–75)
NRBC # BLD: 0 K/UL (ref 0–0.01)
NRBC BLD-RTO: 0 PER 100 WBC
PLATELET # BLD AUTO: 161 K/UL (ref 150–400)
PMV BLD AUTO: 11.1 FL (ref 8.9–12.9)
POTASSIUM SERPL-SCNC: 3.7 MMOL/L (ref 3.5–5.1)
PROT SERPL-MCNC: 6.2 G/DL (ref 6.4–8.2)
RBC # BLD AUTO: 3.72 M/UL (ref 3.8–5.2)
SODIUM SERPL-SCNC: 138 MMOL/L (ref 136–145)
WBC # BLD AUTO: 7 K/UL (ref 3.6–11)

## 2022-04-04 PROCEDURE — 97530 THERAPEUTIC ACTIVITIES: CPT

## 2022-04-04 PROCEDURE — 74011250637 HC RX REV CODE- 250/637: Performed by: STUDENT IN AN ORGANIZED HEALTH CARE EDUCATION/TRAINING PROGRAM

## 2022-04-04 PROCEDURE — 77030027138 HC INCENT SPIROMETER -A

## 2022-04-04 PROCEDURE — 36415 COLL VENOUS BLD VENIPUNCTURE: CPT

## 2022-04-04 PROCEDURE — 85025 COMPLETE CBC W/AUTO DIFF WBC: CPT

## 2022-04-04 PROCEDURE — 65270000029 HC RM PRIVATE

## 2022-04-04 PROCEDURE — 74011000250 HC RX REV CODE- 250: Performed by: STUDENT IN AN ORGANIZED HEALTH CARE EDUCATION/TRAINING PROGRAM

## 2022-04-04 PROCEDURE — 94760 N-INVAS EAR/PLS OXIMETRY 1: CPT

## 2022-04-04 PROCEDURE — 80053 COMPREHEN METABOLIC PANEL: CPT

## 2022-04-04 RX ADMIN — OYSTER SHELL CALCIUM WITH VITAMIN D 1 TABLET: 500; 200 TABLET, FILM COATED ORAL at 16:48

## 2022-04-04 RX ADMIN — GABAPENTIN 100 MG: 100 CAPSULE ORAL at 18:29

## 2022-04-04 RX ADMIN — GABAPENTIN 100 MG: 100 CAPSULE ORAL at 09:27

## 2022-04-04 RX ADMIN — TRAMADOL HYDROCHLORIDE 50 MG: 50 TABLET, COATED ORAL at 07:11

## 2022-04-04 RX ADMIN — POLYETHYLENE GLYCOL 3350 17 G: 17 POWDER, FOR SOLUTION ORAL at 09:27

## 2022-04-04 RX ADMIN — OYSTER SHELL CALCIUM WITH VITAMIN D 1 TABLET: 500; 200 TABLET, FILM COATED ORAL at 07:11

## 2022-04-04 RX ADMIN — APIXABAN 2.5 MG: 2.5 TABLET, FILM COATED ORAL at 18:29

## 2022-04-04 RX ADMIN — FUROSEMIDE 20 MG: 20 TABLET ORAL at 09:27

## 2022-04-04 RX ADMIN — SODIUM CHLORIDE, PRESERVATIVE FREE 10 ML: 5 INJECTION INTRAVENOUS at 16:47

## 2022-04-04 RX ADMIN — SENNOSIDES AND DOCUSATE SODIUM 1 TABLET: 50; 8.6 TABLET ORAL at 18:29

## 2022-04-04 RX ADMIN — OXYCODONE 5 MG: 5 TABLET ORAL at 09:27

## 2022-04-04 RX ADMIN — SODIUM CHLORIDE, PRESERVATIVE FREE 10 ML: 5 INJECTION INTRAVENOUS at 09:27

## 2022-04-04 RX ADMIN — TRAMADOL HYDROCHLORIDE 50 MG: 50 TABLET, COATED ORAL at 16:47

## 2022-04-04 RX ADMIN — APIXABAN 2.5 MG: 2.5 TABLET, FILM COATED ORAL at 09:27

## 2022-04-04 RX ADMIN — OXYCODONE 5 MG: 5 TABLET ORAL at 18:29

## 2022-04-04 RX ADMIN — ASPIRIN 81 MG: 81 TABLET, COATED ORAL at 09:27

## 2022-04-04 RX ADMIN — SENNOSIDES AND DOCUSATE SODIUM 1 TABLET: 50; 8.6 TABLET ORAL at 09:27

## 2022-04-04 NOTE — PROGRESS NOTES
Problem: Falls - Risk of  Goal: *Absence of Falls  Description: Document Lexy Howard Fall Risk and appropriate interventions in the flowsheet. Outcome: Progressing Towards Goal  Note: Fall Risk Interventions:       Mentation Interventions: Bed/chair exit alarm    Medication Interventions: Bed/chair exit alarm    Elimination Interventions: Bed/chair exit alarm    History of Falls Interventions: Bed/chair exit alarm    Problem: Pressure Injury - Risk of  Goal: *Prevention of pressure injury  Description: Document Adarsh Scale and appropriate interventions in the flowsheet. Outcome: Progressing Towards Goal  Note: Pressure Injury Interventions:             Activity Interventions: PT/OT evaluation    Mobility Interventions: PT/OT evaluation    Nutrition Interventions: Offer support with meals,snacks and hydration    Friction and Shear Interventions: HOB 30 degrees or less                  Problem: Patient Education: Go to Patient Education Activity  Goal: Patient/Family Education  Outcome: Progressing Towards Goal

## 2022-04-04 NOTE — PROGRESS NOTES
Physical Therapy    Pt received supine in bed with caregiver in room, pt less alert today, fatigue and difficult to keep awake. PT will defer at this time.      Professionally,     José Antonio Lucio, PTA

## 2022-04-04 NOTE — PROGRESS NOTES
Problem: Self Care Deficits Care Plan (Adult)  Goal: *Acute Goals and Plan of Care (Insert Text)  Description: FUNCTIONAL STATUS PRIOR TO ADMISSION: Patient was modified independent using HHA/contact guard assist for functional mobility. Pt receives assistance for all ADLs from paid in home caregivers M-F 9-5. HOME SUPPORT: The patient lived in mother-in-law suite on daughter's property and required max to total assistance for all ADLs    Occupational Therapy Goals  Initiated 4/2/2022  1. Patient will perform grooming with modified independence within 7 day(s). 2.  Patient will perform upper body dressing with minimal assistance within 7 day(s). 3.  Patient will perform BSC transfers with moderate assistance within 7 day(s). 4. Pt will perform all aspects of toileting with moderate assistance within 7 day(s). Outcome: Not Progressing Towards Goal     OCCUPATIONAL THERAPY TREATMENT  Patient: Broderick Pope (34 y.o. female)  Date: 4/4/2022  Diagnosis: Hip fracture ([address] Kelton Ann <principal problem not specified>  Procedure(s) (LRB):  PERCUTANEOUS SCREWS OF LEFT HIP (Left) 3 Days Post-Op  Precautions:    Chart, occupational therapy assessment, plan of care, and goals were reviewed. ASSESSMENT  Patient continues with skilled OT services and is not progressing towards goals this session. Pt remains limited by generalized weakness, drowsiness, and poor command following. Pt cleared for therapy by nursing and received supine in bed with aide in room. Pt asleep when therapist entered room and slow to wake. Pt responded to command to squeeze therapist's hands, however, overall poor command following throughout session. Vitals sign monitored with BP at 145/82 in supine. Care aid reported pt has been sleeping and slow to respond all morning with momentary wakings to take spoonfuls of food/drink requiring encouragement to swallow.  Reported all symptoms to nursing staff and nurse reporting pt received medications that are affecting her alertness and with no concerns for current state. Aide requesting incentive spirometer and left in room for when pt can follow commands to use it. At discharge, pt will require 24 hour assistance for ADLs and functional mobility. Currently recommend HHOT in addition to 24 hours supervision/assistance. Patient Vitals for the past 4 hrs:   Pulse BP   04/04/22 1300 73 (!) 145/82       Current Level of Function Impacting Discharge (ADLs): Max-Total for functional mobility and ADLs     Other factors to consider for discharge: Max- Total A at baseline          PLAN :  Patient continues to benefit from skilled intervention to address the above impairments. Continue treatment per established plan of care to address goals. Recommend with staff: Pt participation in self care as able     Recommend next OT session: grooming at EOB     Recommendation for discharge: (in order for the patient to meet his/her long term goals)  Occupational therapy at least 2 days/week in the home AND ensure assist and/or supervision for safety with ADLs and functional mobility     This discharge recommendation:  Has been made in collaboration with the attending provider and/or case management    IF patient discharges home will need the following DME: patient owns DME required for discharge       SUBJECTIVE:   Patient stated Oh.     OBJECTIVE DATA SUMMARY:   Cognitive/Behavioral Status:  Neurologic State: Drowsy; Eyes open to voice  Orientation Level: Unable to verbalize  Cognition: Unable to assess (comment)           Functional Mobility and Transfers for ADLs:  Bed Mobility:     Session at bed level   ADL Intervention:       Grooming  Grooming Assistance: Maximum assistance  Position Performed:  (supine)  Washing Face: Maximum assistance         Pain:  None reported    Activity Tolerance:   Poor    After treatment patient left in no apparent distress:   Supine in bed, Call bell within reach, Caregiver / family present and Side rails x 3    COMMUNICATION/COLLABORATION:   The patients plan of care was discussed with: Physical therapy assistant and Registered nurse.      Al Arredondo OT  Time Calculation: 26 mins

## 2022-04-04 NOTE — PROGRESS NOTES
[facility] Adult  Hospitalist Group                                                                                          Hospitalist Progress Note  Imani Garces NP  Answering service: [phone] from in house phone        Date of Service:  2022  NAME:  Ana Charlton  :  1926  MRN:  [id number]      Admission Summary:   80-year-old female who sustained a fall at home. She has a past medical history significant for aphasia, chronic systolic congestive heart failure. She has a history of atrial fibrillation for which she takes apixaban. Work-up in the emergency department revealed a left nondisplaced femoral fracture. Patient had reportedly fallen a couple of days prior to presentation and was noted to be dragging her leg when walking  Interval history / Subjective:     Seen on morning rounds. No acute events overnight.   With some complaints of pain, receiving tramadol          Assessment & Plan:     Left nondisplaced femoral fracture  Postop day 3 percutaneous screw fixation  Orthopedics following, thank you for your assistance  We will continue with tramadol for pain  Working with physical therapy          Chronic systolic congestive heart failure  Stable, not in exacerbation, lungs clear  Continue furosemide    Hypertension  Blood pressure currently controlled  We will continue with furosemide    Atrial fibrillation  Heart rate currently controlled  Continuing apixaban    Code status: Full    DVT prophylaxis:apixaban    Care Plan discussed with: Patient/Family, Nurse and      Anticipated Disposition: SNF/LTC,     Anticipated Discharge: 24 hours to 48 hours     Hospital Problems  Date Reviewed: 2022          Codes Class Noted POA    Atherosclerotic heart disease of native coronary artery without angina pectoris ICD-10-CM: I25.10  ICD-9-CM: 414.01  2022 Yes        Benign essential hypertension ICD-10-CM: I10  ICD-9-CM: 401.1  2022 Yes Chronic a-fib (HCC) ICD-10-CM: I48.20  ICD-9-CM: 427.31  4/1/2022 Yes        Hyperlipidemia ICD-10-CM: E78.5  ICD-9-CM: 272.4  4/1/2022 Yes        Stage 3b chronic kidney disease ([facility] ICD-10-CM: N18.32  ICD-9-CM: 585.3  4/1/2022 Yes        Chronic combined systolic and diastolic heart failure ([facility] ICD-10-CM: I50.42  ICD-9-CM: 428.42  4/1/2022 Yes        Hip fracture ([facility] ICD-10-CM: S72.009A  ICD-9-CM: 820.8  3/31/2022 Unknown        Chronic CHF (HCC) (Chronic) ICD-10-CM: I50.9  ICD-9-CM: 428.0  2/16/2021 Yes        Aphasia ICD-10-CM: R47.01  ICD-9-CM: 784.3  2/13/2021 Yes                Review of Systems:   A comprehensive review of systems was negative except for that written in the HPI. Vital Signs:    Last 24hrs VS reviewed since prior progress note. Most recent are:  Visit Vitals  BP (!) 152/77   Pulse 77   Temp 99.5 °F (37.5 °C)   Resp 17   Ht 5' 2\" (1.575 m)   Wt 47.3 kg (104 lb 3.2 oz)   SpO2 98%   BMI 19.06 kg/m²         Intake/Output Summary (Last 24 hours) at 4/4/2022 1137  Last data filed at 4/3/2022 2039  Gross per 24 hour   Intake 240 ml   Output --   Net 240 ml        Physical Examination:     I had a face to face encounter with this patient and independently examined them on 4/4/2022 as outlined below:          Constitutional:  No acute distress, cooperative, pleasant    ENT:  Oral mucosa moist, oropharynx benign. Resp:  CTA bilaterally. No wheezing/rhonchi/rales. No accessory muscle use   CV:  Regular rhythm, normal rate, no murmurs, gallops, rubs    GI:  Soft, non distended, non tender. normoactive bowel sounds, no hepatosplenomegaly     Musculoskeletal:  No edema, warm, 2+ pulses throughout    Neurologic:  Moves all extremities.   AAOx3, CN II-XII reviewed            Data Review:    Review and/or order of clinical lab test  Review and/or order of tests in the radiology section of CPT      Labs:     Recent Labs     04/04/22  0450 04/03/22  0428   WBC 7.0 6.4   HGB 11.6 11.5   HCT 35.7 35.7    146*     Recent Labs     04/04/22  0450 04/03/22 0428 04/02/22  0357    138 138   K 3.7 3.3* 4.0    104 105   CO2 30 30 28   BUN 32* 32* 28*   CREA 1.34* 1.67* 1.81*   * 98 125*   CA 8.9 8.9 8.2*     Recent Labs     04/04/22  0450 04/03/22 0428 04/02/22 0357   ALT 14 14 11*   * 130* 91   TBILI 0.5 0.4 0.3   TP 6.2* 6.1* 5.9*   ALB 2.6* 2.9* 2.9*   GLOB 3.6 3.2 3.0     No results for input(s): INR, PTP, APTT, INREXT, INREXT in the last 72 hours. No results for input(s): FE, TIBC, PSAT, FERR in the last 72 hours. No results found for: FOL, RBCF   No results for input(s): PH, PCO2, PO2 in the last 72 hours. No results for input(s): CPK, CKNDX, TROIQ in the last 72 hours.     No lab exists for component: CPKMB  Lab Results   Component Value Date/Time    Cholesterol, total 178 02/13/2021 01:22 PM    HDL Cholesterol 48 02/13/2021 01:22 PM    LDL, calculated 91 02/13/2021 01:22 PM    Triglyceride 195 (H) 02/13/2021 01:22 PM    CHOL/HDL Ratio 3.7 02/13/2021 01:22 PM     No results found for: [facility]  Lab Results   Component Value Date/Time    Color YELLOW/STRAW 03/31/2022 07:28 PM    Appearance CLEAR 03/31/2022 07:28 PM    Specific gravity 1.016 03/31/2022 07:28 PM    pH (UA) 5.5 03/31/2022 07:28 PM    Protein 30 (A) 03/31/2022 07:28 PM    Glucose Negative 03/31/2022 07:28 PM    Ketone Negative 03/31/2022 07:28 PM    Bilirubin Negative 03/31/2022 07:28 PM    Urobilinogen 0.2 03/31/2022 07:28 PM    Nitrites Negative 03/31/2022 07:28 PM    Leukocyte Esterase SMALL (A) 03/31/2022 07:28 PM    Epithelial cells FEW 03/31/2022 07:28 PM    Bacteria Negative 03/31/2022 07:28 PM    WBC 20-50 03/31/2022 07:28 PM    RBC 0-5 03/31/2022 07:28 PM         Medications Reviewed:     Current Facility-Administered Medications   Medication Dose Route Frequency    lidocaine (PF) (XYLOCAINE) 10 mg/mL (1 %) injection 0.1 mL  0.1 mL SubCUTAneous PRN    ropivacaine (PF) (NAROPIN) 5 mg/mL (0.5 %) injection 30 mL  30 mL Peripheral Nerve Block PRN    HYDROmorphone (DILAUDID) injection 0.2 mg  0.2 mg IntraVENous Multiple    albuterol (PROVENTIL VENTOLIN) nebulizer solution 2.5 mg  2.5 mg Inhalation PRN    ondansetron (ZOFRAN) injection 4 mg  4 mg IntraVENous PRN    sodium chloride (NS) flush 5-40 mL  5-40 mL IntraVENous Q8H    sodium chloride (NS) flush 5-40 mL  5-40 mL IntraVENous PRN    naloxone (NARCAN) injection 0.4 mg  0.4 mg IntraVENous PRN    calcium-vitamin D (OS-GIRISH +D3) 500 mg-200 unit per tablet 1 Tablet  1 Tablet Oral TID WITH MEALS    senna-docusate (PERICOLACE) 8.6-50 mg per tablet 1 Tablet  1 Tablet Oral BID    polyethylene glycol (MIRALAX) packet 17 g  17 g Oral DAILY    bisacodyL (DULCOLAX) suppository 10 mg  10 mg Rectal DAILY PRN    traMADoL (ULTRAM) tablet 50 mg  50 mg Oral Q6H PRN    oxyCODONE IR (ROXICODONE) tablet 5 mg  5 mg Oral Q4H PRN    apixaban (ELIQUIS) tablet 2.5 mg  2.5 mg Oral BID    gabapentin (NEURONTIN) capsule 100 mg  100 mg Oral BID    atorvastatin (LIPITOR) tablet 20 mg  20 mg Oral QHS    aspirin delayed-release tablet 81 mg  81 mg Oral DAILY    mirtazapine (REMERON) tablet 7.5 mg  7.5 mg Oral QHS    furosemide (LASIX) tablet 20 mg  20 mg Oral DAILY     ______________________________________________________________________  EXPECTED LENGTH OF STAY: - - -  ACTUAL LENGTH OF STAY:          4                 Sally Fernandez NP

## 2022-04-04 NOTE — PROGRESS NOTES
JACEY: SNF choices pending. Chart reviewed. Patient discussed in rounds. Patient now needs SNF placement. Confirmed hospitalist in agreement with plans for SNF. GINA called the daughter, Georgia Dias [phone]. GINA e-mailed SNF list to her at [email or web address].     LADAN Saxena/KARLA

## 2022-04-04 NOTE — PROGRESS NOTES
Ortho Daily Progress Note      Patient: Lula Craig                   MRN: [id number]  Sex: female  YOB: 1926           Age: 80 y.o.    3 Days Post-Op    Procedure(s): PERCUTANEOUS SCREWS OF LEFT HIP    Subjective: Took oxycodone recently     Visit Vitals  BP (!) 152/77   Pulse 77   Temp 99.5 °F (37.5 °C)   Resp 17   Ht 5' 2\" (1.575 m)   Wt 47.3 kg (104 lb 3.2 oz)   SpO2 98%   BMI 19.06 kg/m²        Lab Results:  HGB   Date/Time Value Ref Range Status   04/04/2022 04:50 AM 11.6 11.5 - 16.0 g/dL Final     INR   Date/Time Value Ref Range Status   03/31/2022 05:00 PM 1.1 0.9 - 1.1   Final     Comment:     A single therapeutic range for Vit K antagonists may not be optimal for all indications - see June, 2008 issue of Chest, American College of Chest Physicians Evidence-Based Clinical Practice Guidelines, 8th Edition.        Physical Exam:  GENERAL: 89yo female,fatigued, cooperative, no distress  DRESSING: clean/dry  SWELLING: mild  NEUROLOGICAL: intact  PULSE:yes   DVT Exam: No evidence of DVT seen on physical exam.      Plan:    Xrays yesterday - normal post-operative changes without complication  DVT prophylaxisEliquis  Weight bearing restrictionWBAT  Pain Control: oxycodone effective, use cautiously as seems to cause significant sedation  OK for discharge from ortho standpoint  Follow-up in office 2 weeks post-op  Will sign off, call with questions        TEA Ortega  4/4/2022   11:09 AM      .

## 2022-04-05 LAB
ALBUMIN SERPL-MCNC: 2.7 G/DL (ref 3.5–5)
ALBUMIN/GLOB SERPL: 0.7 {RATIO} (ref 1.1–2.2)
ALP SERPL-CCNC: 141 U/L (ref 45–117)
ALT SERPL-CCNC: 15 U/L (ref 12–78)
ANION GAP SERPL CALC-SCNC: 4 MMOL/L (ref 5–15)
AST SERPL-CCNC: 16 U/L (ref 15–37)
BASOPHILS # BLD: 0 K/UL (ref 0–0.1)
BASOPHILS NFR BLD: 0 % (ref 0–1)
BILIRUB SERPL-MCNC: 0.7 MG/DL (ref 0.2–1)
BUN SERPL-MCNC: 33 MG/DL (ref 6–20)
BUN/CREAT SERPL: 27 (ref 12–20)
CALCIUM SERPL-MCNC: 9.4 MG/DL (ref 8.5–10.1)
CHLORIDE SERPL-SCNC: 104 MMOL/L (ref 97–108)
CO2 SERPL-SCNC: 31 MMOL/L (ref 21–32)
CREAT SERPL-MCNC: 1.24 MG/DL (ref 0.55–1.02)
DIFFERENTIAL METHOD BLD: ABNORMAL
EOSINOPHIL # BLD: 0 K/UL (ref 0–0.4)
EOSINOPHIL NFR BLD: 0 % (ref 0–7)
ERYTHROCYTE [DISTWIDTH] IN BLOOD BY AUTOMATED COUNT: 14.6 % (ref 11.5–14.5)
GLOBULIN SER CALC-MCNC: 3.7 G/DL (ref 2–4)
GLUCOSE SERPL-MCNC: 126 MG/DL (ref 65–100)
HCT VFR BLD AUTO: 36.8 % (ref 35–47)
HGB BLD-MCNC: 11.8 G/DL (ref 11.5–16)
IMM GRANULOCYTES # BLD AUTO: 0 K/UL (ref 0–0.04)
IMM GRANULOCYTES NFR BLD AUTO: 1 % (ref 0–0.5)
LYMPHOCYTES # BLD: 1.5 K/UL (ref 0.8–3.5)
LYMPHOCYTES NFR BLD: 20 % (ref 12–49)
MCH RBC QN AUTO: 30.9 PG (ref 26–34)
MCHC RBC AUTO-ENTMCNC: 32.1 G/DL (ref 30–36.5)
MCV RBC AUTO: 96.3 FL (ref 80–99)
MONOCYTES # BLD: 0.9 K/UL (ref 0–1)
MONOCYTES NFR BLD: 11 % (ref 5–13)
NEUTS SEG # BLD: 5.3 K/UL (ref 1.8–8)
NEUTS SEG NFR BLD: 68 % (ref 32–75)
NRBC # BLD: 0 K/UL (ref 0–0.01)
NRBC BLD-RTO: 0 PER 100 WBC
PLATELET # BLD AUTO: 175 K/UL (ref 150–400)
PMV BLD AUTO: 11.2 FL (ref 8.9–12.9)
POTASSIUM SERPL-SCNC: 3.4 MMOL/L (ref 3.5–5.1)
PROT SERPL-MCNC: 6.4 G/DL (ref 6.4–8.2)
RBC # BLD AUTO: 3.82 M/UL (ref 3.8–5.2)
SODIUM SERPL-SCNC: 139 MMOL/L (ref 136–145)
WBC # BLD AUTO: 7.7 K/UL (ref 3.6–11)

## 2022-04-05 PROCEDURE — 97530 THERAPEUTIC ACTIVITIES: CPT

## 2022-04-05 PROCEDURE — 74011250637 HC RX REV CODE- 250/637: Performed by: STUDENT IN AN ORGANIZED HEALTH CARE EDUCATION/TRAINING PROGRAM

## 2022-04-05 PROCEDURE — 97116 GAIT TRAINING THERAPY: CPT

## 2022-04-05 PROCEDURE — 74011250637 HC RX REV CODE- 250/637: Performed by: NURSE PRACTITIONER

## 2022-04-05 PROCEDURE — 65270000029 HC RM PRIVATE

## 2022-04-05 PROCEDURE — 36415 COLL VENOUS BLD VENIPUNCTURE: CPT

## 2022-04-05 PROCEDURE — 74011000250 HC RX REV CODE- 250: Performed by: STUDENT IN AN ORGANIZED HEALTH CARE EDUCATION/TRAINING PROGRAM

## 2022-04-05 PROCEDURE — 85025 COMPLETE CBC W/AUTO DIFF WBC: CPT

## 2022-04-05 PROCEDURE — 80053 COMPREHEN METABOLIC PANEL: CPT

## 2022-04-05 RX ORDER — OXYCODONE HYDROCHLORIDE 5 MG/1
2.5 TABLET ORAL
Status: DISCONTINUED | OUTPATIENT
Start: 2022-04-05 | End: 2022-04-07 | Stop reason: HOSPADM

## 2022-04-05 RX ORDER — POTASSIUM CHLORIDE 750 MG/1
40 TABLET, FILM COATED, EXTENDED RELEASE ORAL
Status: COMPLETED | OUTPATIENT
Start: 2022-04-05 | End: 2022-04-05

## 2022-04-05 RX ADMIN — SODIUM CHLORIDE, PRESERVATIVE FREE 10 ML: 5 INJECTION INTRAVENOUS at 05:56

## 2022-04-05 RX ADMIN — ASPIRIN 81 MG: 81 TABLET, COATED ORAL at 09:40

## 2022-04-05 RX ADMIN — SODIUM CHLORIDE, PRESERVATIVE FREE 10 ML: 5 INJECTION INTRAVENOUS at 21:44

## 2022-04-05 RX ADMIN — APIXABAN 2.5 MG: 2.5 TABLET, FILM COATED ORAL at 09:40

## 2022-04-05 RX ADMIN — MIRTAZAPINE 7.5 MG: 15 TABLET ORAL at 00:49

## 2022-04-05 RX ADMIN — TRAMADOL HYDROCHLORIDE 50 MG: 50 TABLET, COATED ORAL at 19:25

## 2022-04-05 RX ADMIN — SENNOSIDES AND DOCUSATE SODIUM 1 TABLET: 50; 8.6 TABLET ORAL at 09:39

## 2022-04-05 RX ADMIN — OXYCODONE 2.5 MG: 5 TABLET ORAL at 11:04

## 2022-04-05 RX ADMIN — GABAPENTIN 100 MG: 100 CAPSULE ORAL at 19:25

## 2022-04-05 RX ADMIN — APIXABAN 2.5 MG: 2.5 TABLET, FILM COATED ORAL at 21:44

## 2022-04-05 RX ADMIN — FUROSEMIDE 20 MG: 20 TABLET ORAL at 09:39

## 2022-04-05 RX ADMIN — ATORVASTATIN CALCIUM 20 MG: 10 TABLET, FILM COATED ORAL at 21:44

## 2022-04-05 RX ADMIN — MIRTAZAPINE 7.5 MG: 15 TABLET ORAL at 21:44

## 2022-04-05 RX ADMIN — SODIUM CHLORIDE, PRESERVATIVE FREE 10 ML: 5 INJECTION INTRAVENOUS at 19:27

## 2022-04-05 RX ADMIN — SODIUM CHLORIDE, PRESERVATIVE FREE 10 ML: 5 INJECTION INTRAVENOUS at 00:50

## 2022-04-05 RX ADMIN — POLYETHYLENE GLYCOL 3350 17 G: 17 POWDER, FOR SOLUTION ORAL at 09:41

## 2022-04-05 RX ADMIN — ATORVASTATIN CALCIUM 20 MG: 10 TABLET, FILM COATED ORAL at 00:49

## 2022-04-05 RX ADMIN — OXYCODONE 2.5 MG: 5 TABLET ORAL at 16:30

## 2022-04-05 RX ADMIN — POTASSIUM CHLORIDE 40 MEQ: 750 TABLET, EXTENDED RELEASE ORAL at 09:39

## 2022-04-05 RX ADMIN — SENNOSIDES AND DOCUSATE SODIUM 1 TABLET: 50; 8.6 TABLET ORAL at 19:24

## 2022-04-05 RX ADMIN — GABAPENTIN 100 MG: 100 CAPSULE ORAL at 09:53

## 2022-04-05 NOTE — PROGRESS NOTES
Physician Progress Note      Patricia Jack  CSN #:                  [id number]  :                       1926  ADMIT DATE:       3/31/2022 4:19 PM  DISCH DATE:  RESPONDING  PROVIDER #:        [address] NP          QUERY TEXT:    Pt admitted with left, nondisplaced femoral neck fracture s/p fall/trip over her dog. 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 If possible, please document in progress notes and discharge summary if you are evaluating and/or treating any of the following: The medical record reflects the following:  Risk Factors: 89yo who fell/tripped over her dog, noted to have mild-moderate hip osteoarthrosis on images    Clinical Indicators:  Triage nurse: Sustained GLF at home 3 days ago when she tripped over her dog. C/o L hip pain, rates 4/10. Unable to bear weight per caretaker. Left hip x-ray  The bones are severely osteopenic. There is mild-moderate bilateral hip osteoarthrosis. Degenerative findings are also noted in the SI joints and lower lumbar spine. CT pelvis  1. Severe diffuse osteopenia. 13 mm lytic focus in left posterior iliac bone of undetermined significance. 2.Garden 1 fracture of left femur. 3.Chondrocalcinosis and degenerative findings as above.     Treatment: percutaneous screw fixation, ortho following, PT/OT    Thank you,  Cedric Hutchinson  [phone]  Options provided:  -- Pathological left femoral neck fracture due to osteopenia following fall which would not usually break a normal, healthy bone  -- Osteoporotic left femoral neck Fracture  -- Osteoporotic left femoral neck fracture following fall which would not usually break a normal, healthy bone  -- Traumatic left femoral neck fracture  -- Other - I will add my own diagnosis  -- Disagree - Not applicable / Not valid  -- Disagree - Clinically unable to determine / Unknown  -- Refer to Clinical Documentation Reviewer    PROVIDER RESPONSE TEXT:    Provider is clinically unable to determine a response to this query.     Query created by: Hector Hamm on 4/5/2022 3:13 PM      Electronically signed by:  Mechelle Bey NP 4/5/2022 3:24 PM

## 2022-04-05 NOTE — PROGRESS NOTES
JACEY: SNF referrals are pending. CM called the daughter, SOPHIA [phone] to follow-up on SNF choices. Olman stated that she is still reviewing list and will follow-up with CM with choices. CM received message from daughter Olman with SNF choices: [facility], Pending sale to [facility], [place], and [facility]. CM sent referrals.     DOMINIC RomanW/CRM

## 2022-04-05 NOTE — ROUTINE PROCESS
Bedside shift change report given to ,RN (oncoming nurse) by David Singleton RN(offgoing nurse). Report given with SBAR.

## 2022-04-05 NOTE — PROGRESS NOTES
[facility] Adult  Hospitalist Group                                                                                          Hospitalist Progress Note  Amish Cat NP  Answering service: [phone] from in house phone        Date of Service:  2022  NAME:  Barbara Slaughter  :  1926  MRN:  [id number]      Admission Summary:   80-year-old female who sustained a fall at home. She has a past medical history significant for aphasia, chronic systolic congestive heart failure. She has a history of atrial fibrillation for which she takes apixaban. Work-up in the emergency department revealed a left nondisplaced femoral fracture. Patient had reportedly fallen a couple of days prior to presentation and was noted to be dragging her leg when walking  Interval history / Subjective:     I saw the patient this morning on rounds. Somewhat hypertensive this morning, had yet to receive her antihypertensives. Assessment & Plan:     Left nondisplaced femoral fracture  Postop day 4 percutaneous screw fixation  Orthopedics following, thank you for your assistance  Continuing tramadol for pain. Oxycodone actually makes her lethargic.   We will add a very small dose, 2.5 mg for breakthrough pain  Working with physical therapy            Chronic systolic congestive heart failure  Stable, not in exacerbation, lungs clear  Continuing furosemide    Hypertension  Blood pressure currently mildly elevated  Continuing furosemide      Atrial fibrillation  Heart rate currently controlled  Continuing apixaban    Code status: Full    DVT prophylaxis:apixaban    Care Plan discussed with: Patient/Family, Nurse and      Anticipated Disposition: SNF/LTC,     Anticipated Discharge: Less than 24 hours     Hospital Problems  Date Reviewed: 2022          Codes Class Noted POA    Atherosclerotic heart disease of native coronary artery without angina pectoris ICD-10-CM: I25.10  ICD-9-CM: 414.01 4/1/2022 Yes        Benign essential hypertension ICD-10-CM: I10  ICD-9-CM: 401.1  4/1/2022 Yes        Chronic a-fib (HCC) ICD-10-CM: I48.20  ICD-9-CM: 427.31  4/1/2022 Yes        Hyperlipidemia ICD-10-CM: E78.5  ICD-9-CM: 272.4  4/1/2022 Yes        Stage 3b chronic kidney disease ([facility] ICD-10-CM: N18.32  ICD-9-CM: 585.3  4/1/2022 Yes        Chronic combined systolic and diastolic heart failure ([facility] ICD-10-CM: I50.42  ICD-9-CM: 428.42  4/1/2022 Yes        Hip fracture ([facility] ICD-10-CM: S72.009A  ICD-9-CM: 820.8  3/31/2022 Unknown        Chronic CHF (HCC) (Chronic) ICD-10-CM: I50.9  ICD-9-CM: 428.0  2/16/2021 Yes        Aphasia ICD-10-CM: R47.01  ICD-9-CM: 784.3  2/13/2021 Yes                Review of Systems:   A comprehensive review of systems was negative except for that written in the HPI. Vital Signs:    Last 24hrs VS reviewed since prior progress note. Most recent are:  Visit Vitals  BP (!) 171/93   Pulse 81   Temp 98.2 °F (36.8 °C)   Resp 17   Ht 5' 2\" (1.575 m)   Wt 47.3 kg (104 lb 3.2 oz)   SpO2 95%   BMI 19.06 kg/m²       No intake or output data in the 24 hours ending 04/05/22 1239     Physical Examination:     I had a face to face encounter with this patient and independently examined them on 4/5/2022 as outlined below:          Constitutional:  No acute distress, cooperative, pleasant    ENT:  Oral mucosa moist, oropharynx benign. Resp:  CTA bilaterally. No wheezing/rhonchi/rales. No accessory muscle use   CV:  Regular rhythm, normal rate, no murmurs, gallops, rubs    GI:  Soft, non distended, non tender. normoactive bowel sounds, no hepatosplenomegaly     Musculoskeletal:  No edema, warm, 2+ pulses throughout    Neurologic:  Moves all extremities.   AAOx3, CN II-XII reviewed            Data Review:    Review and/or order of clinical lab test  Review and/or order of tests in the radiology section of CPT      Labs:     Recent Labs     04/05/22  0453 04/04/22  0450   WBC 7.7 7.0   HGB 11.8 11.6 HCT 36.8 35.7    161     Recent Labs     04/05/22 0453 04/04/22 0450 04/03/22 0428    138 138   K 3.4* 3.7 3.3*    105 104   CO2 31 30 30   BUN 33* 32* 32*   CREA 1.24* 1.34* 1.67*   * 145* 98   CA 9.4 8.9 8.9     Recent Labs     04/05/22 0453 04/04/22 0450 04/03/22 0428   ALT 15 14 14   * 148* 130*   TBILI 0.7 0.5 0.4   TP 6.4 6.2* 6.1*   ALB 2.7* 2.6* 2.9*   GLOB 3.7 3.6 3.2     No results for input(s): INR, PTP, APTT, INREXT, INREXT in the last 72 hours. No results for input(s): FE, TIBC, PSAT, FERR in the last 72 hours. No results found for: FOL, RBCF   No results for input(s): PH, PCO2, PO2 in the last 72 hours. No results for input(s): CPK, CKNDX, TROIQ in the last 72 hours.     No lab exists for component: CPKMB  Lab Results   Component Value Date/Time    Cholesterol, total 178 02/13/2021 01:22 PM    HDL Cholesterol 48 02/13/2021 01:22 PM    LDL, calculated 91 02/13/2021 01:22 PM    Triglyceride 195 (H) 02/13/2021 01:22 PM    CHOL/HDL Ratio 3.7 02/13/2021 01:22 PM     No results found for: [facility]  Lab Results   Component Value Date/Time    Color YELLOW/STRAW 03/31/2022 07:28 PM    Appearance CLEAR 03/31/2022 07:28 PM    Specific gravity 1.016 03/31/2022 07:28 PM    pH (UA) 5.5 03/31/2022 07:28 PM    Protein 30 (A) 03/31/2022 07:28 PM    Glucose Negative 03/31/2022 07:28 PM    Ketone Negative 03/31/2022 07:28 PM    Bilirubin Negative 03/31/2022 07:28 PM    Urobilinogen 0.2 03/31/2022 07:28 PM    Nitrites Negative 03/31/2022 07:28 PM    Leukocyte Esterase SMALL (A) 03/31/2022 07:28 PM    Epithelial cells FEW 03/31/2022 07:28 PM    Bacteria Negative 03/31/2022 07:28 PM    WBC 20-50 03/31/2022 07:28 PM    RBC 0-5 03/31/2022 07:28 PM         Medications Reviewed:     Current Facility-Administered Medications   Medication Dose Route Frequency    oxyCODONE IR (ROXICODONE) tablet 2.5 mg  2.5 mg Oral Q4H PRN    lidocaine (PF) (XYLOCAINE) 10 mg/mL (1 %) injection 0.1 mL 0.1 mL SubCUTAneous PRN    ropivacaine (PF) (NAROPIN) 5 mg/mL (0.5 %) injection 30 mL  30 mL Peripheral Nerve Block PRN    HYDROmorphone (DILAUDID) injection 0.2 mg  0.2 mg IntraVENous Multiple    albuterol (PROVENTIL VENTOLIN) nebulizer solution 2.5 mg  2.5 mg Inhalation PRN    ondansetron (ZOFRAN) injection 4 mg  4 mg IntraVENous PRN    sodium chloride (NS) flush 5-40 mL  5-40 mL IntraVENous Q8H    sodium chloride (NS) flush 5-40 mL  5-40 mL IntraVENous PRN    naloxone (NARCAN) injection 0.4 mg  0.4 mg IntraVENous PRN    calcium-vitamin D (OS-GIRISH +D3) 500 mg-200 unit per tablet 1 Tablet  1 Tablet Oral TID WITH MEALS    senna-docusate (PERICOLACE) 8.6-50 mg per tablet 1 Tablet  1 Tablet Oral BID    polyethylene glycol (MIRALAX) packet 17 g  17 g Oral DAILY    bisacodyL (DULCOLAX) suppository 10 mg  10 mg Rectal DAILY PRN    traMADoL (ULTRAM) tablet 50 mg  50 mg Oral Q6H PRN    apixaban (ELIQUIS) tablet 2.5 mg  2.5 mg Oral BID    gabapentin (NEURONTIN) capsule 100 mg  100 mg Oral BID    atorvastatin (LIPITOR) tablet 20 mg  20 mg Oral QHS    aspirin delayed-release tablet 81 mg  81 mg Oral DAILY    mirtazapine (REMERON) tablet 7.5 mg  7.5 mg Oral QHS    furosemide (LASIX) tablet 20 mg  20 mg Oral DAILY     ______________________________________________________________________  EXPECTED LENGTH OF STAY: - - -  ACTUAL LENGTH OF STAY:          5                 Paula Pizarro NP Yes

## 2022-04-05 NOTE — PROGRESS NOTES
Problem: Mobility Impaired (Adult and Pediatric)  Goal: *Acute Goals and Plan of Care (Insert Text)  Description: FUNCTIONAL STATUS PRIOR TO ADMISSION: Patient was modified independent using a HHA or contact guard assistance for functional mobility. HOME SUPPORT PRIOR TO ADMISSION: The patient lives with her daughter and son in law. She has a caregiver who assists her M-F 9-5. Physical Therapy Goals  Initiated 4/1/2022    1. Patient will move from supine to sit and sit to supine  in bed with minimal assistance/contact guard assist within 4 days. 2. Patient will perform sit to stand with minimal assistance/contact guard assist within 4 days. 3. Patient will ambulate with minimal assistance/contact guard assist for 25 feet with the least restrictive device within 4 days. 4. Patient will ascend/descend 3 stairs with 1 handrail(s) with minimal assistance/contact guard assist within 4 days. 5. Patient will verbalize and demonstrate understanding of weight bearing as tolerated precautions per protocol within 4 days. Outcome: Progressing Towards Goal    PHYSICAL THERAPY TREATMENT  Patient: Joaquin Presley (13 y.o. female)  Date: 4/5/2022  Diagnosis: Hip fracture ([facility] [S72.009A] <principal problem not specified>  Procedure(s) (LRB):  PERCUTANEOUS SCREWS OF LEFT HIP (Left) 4 Days Post-Op  Precautions:    Chart, physical therapy assessment, plan of care and goals were reviewed. ASSESSMENT  Patient continues with skilled PT services and is slowly progressing towards goals. Pt received supine in bed, willing to work with therapy. Pt continues to be limited by pain, decreased strength, activity tolerance and independence this session. Pt able to tolerate SPT to chair on R side of bed with min A and anterior support with gait belt. Pt does not indicate a number of pain but clear pain with activity with facial expressions and noises.  Once pt comfortable in chair pt request water and drank a fair amount, along with some strawberry flavored ensure. Pt completed session seated in chair with call bell within reach and all needs met at the time. Rn notified of session. Current Level of Function Impacting Discharge (mobility/balance): mod A for bed moiblity, min A for all other SPT with anterior support instead of RW. Other factors to consider for discharge: fall risk         PLAN :  Patient continues to benefit from skilled intervention to address the above impairments. Continue treatment per established plan of care. to address goals. Recommendation for discharge: (in order for the patient to meet his/her long term goals)  Therapy up to 5 days/week in SNF setting    This discharge recommendation:  Has not yet been discussed the attending provider and/or case management    IF patient discharges home will need the following DME: to be determined (TBD)       SUBJECTIVE:   Patient stated  can I have some water.     OBJECTIVE DATA SUMMARY:   Critical Behavior:  Neurologic State: Alert  Orientation Level: Oriented to person  Cognition: Unable to assess (comment)  Safety/Judgement: Decreased awareness of environment  Functional Mobility Training:  Bed Mobility:  Supine to Sit: Moderate assistance  Scooting: Minimum assistance    Transfers:  Sit to Stand: Minimum assistance; Additional time  Stand to Sit: Minimum assistance; Additional time     Balance:  Sitting: Intact  Standing: Impaired; With support  Standing - Static: Constant support; Fair  Standing - Dynamic : Constant support; Fair    Ambulation/Gait Training:  Distance (ft): 3 Feet (ft) (SPT to chair)  Assistive Device: Gait belt (anterior support)  Ambulation - Level of Assistance: Minimal assistance; Additional time  Gait Abnormalities: Decreased step clearance  Base of Support: Narrowed  Step Length: Left shortened;Right shortened    Pain Rating:  Indicated pain no # given    Activity Tolerance:   Fair    After treatment patient left in no apparent distress: Sitting in chair and Call bell within reach    COMMUNICATION/COLLABORATION:   The patients plan of care was discussed with: Registered nurse.      Dereck Cartwright PTA   Time Calculation: 29 mins

## 2022-04-06 ENCOUNTER — APPOINTMENT (OUTPATIENT)
Dept: GENERAL RADIOLOGY | Age: 87
DRG: 481 | End: 2022-04-06
Attending: NURSE PRACTITIONER
Payer: MEDICARE

## 2022-04-06 LAB
ALBUMIN SERPL-MCNC: 2.6 G/DL (ref 3.5–5)
ALBUMIN/GLOB SERPL: 0.8 {RATIO} (ref 1.1–2.2)
ALP SERPL-CCNC: 134 U/L (ref 45–117)
ALT SERPL-CCNC: 14 U/L (ref 12–78)
ANION GAP SERPL CALC-SCNC: 4 MMOL/L (ref 5–15)
AST SERPL-CCNC: 19 U/L (ref 15–37)
BASOPHILS # BLD: 0 K/UL (ref 0–0.1)
BASOPHILS NFR BLD: 0 % (ref 0–1)
BILIRUB SERPL-MCNC: 0.9 MG/DL (ref 0.2–1)
BUN SERPL-MCNC: 38 MG/DL (ref 6–20)
BUN/CREAT SERPL: 31 (ref 12–20)
CALCIUM SERPL-MCNC: 9.1 MG/DL (ref 8.5–10.1)
CHLORIDE SERPL-SCNC: 103 MMOL/L (ref 97–108)
CO2 SERPL-SCNC: 31 MMOL/L (ref 21–32)
COVID-19 RAPID TEST, COVR: NOT DETECTED
CREAT SERPL-MCNC: 1.21 MG/DL (ref 0.55–1.02)
DIFFERENTIAL METHOD BLD: ABNORMAL
EOSINOPHIL # BLD: 0.1 K/UL (ref 0–0.4)
EOSINOPHIL NFR BLD: 1 % (ref 0–7)
ERYTHROCYTE [DISTWIDTH] IN BLOOD BY AUTOMATED COUNT: 14.5 % (ref 11.5–14.5)
GLOBULIN SER CALC-MCNC: 3.3 G/DL (ref 2–4)
GLUCOSE SERPL-MCNC: 107 MG/DL (ref 65–100)
HCT VFR BLD AUTO: 33.8 % (ref 35–47)
HGB BLD-MCNC: 11 G/DL (ref 11.5–16)
IMM GRANULOCYTES # BLD AUTO: 0 K/UL (ref 0–0.04)
IMM GRANULOCYTES NFR BLD AUTO: 1 % (ref 0–0.5)
LYMPHOCYTES # BLD: 1.6 K/UL (ref 0.8–3.5)
LYMPHOCYTES NFR BLD: 25 % (ref 12–49)
MCH RBC QN AUTO: 31.3 PG (ref 26–34)
MCHC RBC AUTO-ENTMCNC: 32.5 G/DL (ref 30–36.5)
MCV RBC AUTO: 96.3 FL (ref 80–99)
MONOCYTES # BLD: 0.7 K/UL (ref 0–1)
MONOCYTES NFR BLD: 11 % (ref 5–13)
NEUTS SEG # BLD: 4.1 K/UL (ref 1.8–8)
NEUTS SEG NFR BLD: 62 % (ref 32–75)
NRBC # BLD: 0 K/UL (ref 0–0.01)
NRBC BLD-RTO: 0 PER 100 WBC
PLATELET # BLD AUTO: 192 K/UL (ref 150–400)
PMV BLD AUTO: 11 FL (ref 8.9–12.9)
POTASSIUM SERPL-SCNC: 3.5 MMOL/L (ref 3.5–5.1)
PROT SERPL-MCNC: 5.9 G/DL (ref 6.4–8.2)
RBC # BLD AUTO: 3.51 M/UL (ref 3.8–5.2)
SODIUM SERPL-SCNC: 138 MMOL/L (ref 136–145)
SOURCE, COVRS: NORMAL
WBC # BLD AUTO: 6.5 K/UL (ref 3.6–11)

## 2022-04-06 PROCEDURE — 85025 COMPLETE CBC W/AUTO DIFF WBC: CPT

## 2022-04-06 PROCEDURE — 87635 SARS-COV-2 COVID-19 AMP PRB: CPT

## 2022-04-06 PROCEDURE — 74011000250 HC RX REV CODE- 250: Performed by: STUDENT IN AN ORGANIZED HEALTH CARE EDUCATION/TRAINING PROGRAM

## 2022-04-06 PROCEDURE — 71045 X-RAY EXAM CHEST 1 VIEW: CPT

## 2022-04-06 PROCEDURE — 74011250637 HC RX REV CODE- 250/637: Performed by: STUDENT IN AN ORGANIZED HEALTH CARE EDUCATION/TRAINING PROGRAM

## 2022-04-06 PROCEDURE — 80053 COMPREHEN METABOLIC PANEL: CPT

## 2022-04-06 PROCEDURE — 65270000029 HC RM PRIVATE

## 2022-04-06 PROCEDURE — 94760 N-INVAS EAR/PLS OXIMETRY 1: CPT

## 2022-04-06 PROCEDURE — 74011250637 HC RX REV CODE- 250/637: Performed by: NURSE PRACTITIONER

## 2022-04-06 PROCEDURE — 36415 COLL VENOUS BLD VENIPUNCTURE: CPT

## 2022-04-06 RX ADMIN — OXYCODONE 2.5 MG: 5 TABLET ORAL at 10:04

## 2022-04-06 RX ADMIN — GABAPENTIN 100 MG: 100 CAPSULE ORAL at 10:04

## 2022-04-06 RX ADMIN — SODIUM CHLORIDE, PRESERVATIVE FREE 10 ML: 5 INJECTION INTRAVENOUS at 21:01

## 2022-04-06 RX ADMIN — ATORVASTATIN CALCIUM 20 MG: 10 TABLET, FILM COATED ORAL at 21:01

## 2022-04-06 RX ADMIN — OXYCODONE 2.5 MG: 5 TABLET ORAL at 18:03

## 2022-04-06 RX ADMIN — SENNOSIDES AND DOCUSATE SODIUM 1 TABLET: 50; 8.6 TABLET ORAL at 18:03

## 2022-04-06 RX ADMIN — GABAPENTIN 100 MG: 100 CAPSULE ORAL at 18:03

## 2022-04-06 RX ADMIN — SODIUM CHLORIDE, PRESERVATIVE FREE 10 ML: 5 INJECTION INTRAVENOUS at 06:55

## 2022-04-06 RX ADMIN — TRAMADOL HYDROCHLORIDE 50 MG: 50 TABLET, COATED ORAL at 06:54

## 2022-04-06 RX ADMIN — TRAMADOL HYDROCHLORIDE 50 MG: 50 TABLET, COATED ORAL at 20:55

## 2022-04-06 RX ADMIN — MIRTAZAPINE 7.5 MG: 15 TABLET ORAL at 21:01

## 2022-04-06 RX ADMIN — SENNOSIDES AND DOCUSATE SODIUM 1 TABLET: 50; 8.6 TABLET ORAL at 10:04

## 2022-04-06 RX ADMIN — APIXABAN 2.5 MG: 2.5 TABLET, FILM COATED ORAL at 10:04

## 2022-04-06 RX ADMIN — APIXABAN 2.5 MG: 2.5 TABLET, FILM COATED ORAL at 21:01

## 2022-04-06 RX ADMIN — FUROSEMIDE 20 MG: 20 TABLET ORAL at 10:04

## 2022-04-06 RX ADMIN — POLYETHYLENE GLYCOL 3350 17 G: 17 POWDER, FOR SOLUTION ORAL at 10:05

## 2022-04-06 RX ADMIN — TRAMADOL HYDROCHLORIDE 50 MG: 50 TABLET, COATED ORAL at 13:59

## 2022-04-06 RX ADMIN — ASPIRIN 81 MG: 81 TABLET, COATED ORAL at 10:04

## 2022-04-06 RX ADMIN — SODIUM CHLORIDE, PRESERVATIVE FREE 10 ML: 5 INJECTION INTRAVENOUS at 18:03

## 2022-04-06 RX ADMIN — OYSTER SHELL CALCIUM WITH VITAMIN D 1 TABLET: 500; 200 TABLET, FILM COATED ORAL at 18:03

## 2022-04-06 NOTE — PROGRESS NOTES
JACEY: [facility] has accepted patient and has a bed tomorrow. Rapid covid test negative 4/6. [facility] requires PCR covid test and chest x-ray. CM notified attending. BLS to transport at discharge. Chart reviewed. CM called [facility] [phone] and left message for [facility]. CM called [facility] [phone] and left message. CM called [facility] #[phone] and left message for Danny Martins in admissions. CM spoke with Danny Martins at [facility]. They can accept patient today. They require covid test within 72 hours of DC. CM notified RN that covid test is needed. CM called the daughter, ATDIV [phone]. Daughter prefers to hear back from [facility] or [facility] before moving forward with the discharge. Hospitalist aware. CM spoke with Dulce Maria Balbuena at [facility]. They are asking about whether the lytic lesion will be treated. CM notified attending. 3:46 PM: CM called [facility] #[phone] and spoke with Gene Lee. They are supposed to give an answer on acceptance by 4:30 today. 4:11 PM: CM spoke with Dulce Maria Balbuena with [address] [place] [address]. They can accept patient tomorrow. They require a PCR covid test and a chest x-ray. CM notified attending.     Arslan Ruiz, BSW/CRM

## 2022-04-06 NOTE — PROGRESS NOTES
Spiritual Care Assessment/Progress Note  [facility]      NAME: Darinel Bowen      MRN: [id number]  AGE: 80 y.o. SEX: female  Jew Affiliation: No preference   Language: English     4/6/2022     Total Time (in minutes): 11     Spiritual Assessment begun in [facility] through conversation with:         []Patient        [] Family    [x] Friend(s)        Reason for Consult: Initial/Spiritual assessment, patient floor     Spiritual beliefs: (Please include comment if needed)     [] Identifies with a arnaud tradition:         [] Supported by a arnaud community:            [] Claims no spiritual orientation:           [] Seeking spiritual identity:                [] Adheres to an individual form of spirituality:           [x] Not able to assess:                           Identified resources for coping:      [] Prayer                               [] Music                  [] Guided Imagery     [] Family/friends                 [] Pet visits     [] Devotional reading                         [x] Unknown     [] Other:                                             Interventions offered during this visit: (See comments for more details)          Family/Friend(s): Initial Assessment     Plan of Care:     [] Support spiritual and/or cultural needs    [] Support AMD and/or advance care planning process      [] Support grieving process   [] Coordinate Rites and/or Rituals    [] Coordination with community clergy   [] No spiritual needs identified at this time   [] Detailed Plan of Care below (See Comments)  [] Make referral to Music Therapy  [] Make referral to Pet Therapy     [] Make referral to Addiction services  [] Make referral to [facility]  [] Make referral to Spiritual Care Partner  [] No future visits requested        [x] Contact Spiritual Care for further referrals     Comments: Initial spiritual assessment in [facility].   Miss Amarjit García appeared to be sleeping, her Aid of about three months was in the room. She indicated they were looking for placement in another facility. She is not sure about [facility]. Provided words of comfort and peace. Contact Spiritual Care for any further referrals.   Natanael Barclay., MS., [address] ([phone])

## 2022-04-06 NOTE — PROGRESS NOTES
[address] [facility] Adult  Hospitalist Group                                                                                          Hospitalist Progress Note  Bayron Munguia NP  Answering service: [phone] from in house phone        Date of Service:  2022  NAME:  Christian January  :  1926  MRN:  [id number]      Admission Summary:   68-year-old female who sustained a fall at home. She has a past medical history significant for aphasia, chronic systolic congestive heart failure. She has a history of atrial fibrillation for which she takes apixaban. Work-up in the emergency department revealed a left nondisplaced femoral fracture. Patient had reportedly fallen a couple of days prior to presentation and was noted to be dragging her leg when walking  Interval history / Subjective:       Patient seen on morning rounds. No acute events overnight       Assessment & Plan:     Left nondisplaced femoral fracture  Postop day 5 percutaneous screw fixation  Orthopedics following, thank you for your assistance  Pain currently controlled  Continuing tramadol  Continuing oxycodone 2.5 mg for breakthrough pain  Separate oxycodone from tramadol by at least 1 hour  Working with physical therapy        Chronic systolic congestive heart failure  Stable, not in exacerbation, lungs clear  Continuing furosemide    Hypertension  Blood pressure currently controlled  Continuing furosemide    Atrial fibrillation  Heart rate currently controlled  Continue apixaban    Code status: Full    DVT prophylaxis:apixaban    Care Plan discussed with: Patient/Family, Nurse and      Anticipated Disposition: SNF/LTC,     Anticipated Discharge: Likely tomorrow.   Patient is medically ready for discharge today     Hospital Problems  Date Reviewed: 2022          Codes Class Noted POA    Atherosclerotic heart disease of native coronary artery without angina pectoris ICD-10-CM: I25.10  ICD-9-CM: 414.01  2022 Yes        Benign essential hypertension ICD-10-CM: I10  ICD-9-CM: 401.1  4/1/2022 Yes        Chronic a-fib (HCC) ICD-10-CM: I48.20  ICD-9-CM: 427.31  4/1/2022 Yes        Hyperlipidemia ICD-10-CM: E78.5  ICD-9-CM: 272.4  4/1/2022 Yes        Stage 3b chronic kidney disease ([facility] ICD-10-CM: N18.32  ICD-9-CM: 585.3  4/1/2022 Yes        Chronic combined systolic and diastolic heart failure ([facility] ICD-10-CM: I50.42  ICD-9-CM: 428.42  4/1/2022 Yes        Hip fracture ([facility] ICD-10-CM: S72.009A  ICD-9-CM: 820.8  3/31/2022 Unknown        Chronic CHF (HCC) (Chronic) ICD-10-CM: I50.9  ICD-9-CM: 428.0  2/16/2021 Yes        Aphasia ICD-10-CM: R47.01  ICD-9-CM: 784.3  2/13/2021 Yes                Review of Systems:   A comprehensive review of systems was negative except for that written in the HPI. Vital Signs:    Last 24hrs VS reviewed since prior progress note. Most recent are:  Visit Vitals  /77   Pulse 91   Temp 98.1 °F (36.7 °C)   Resp 15   Ht 5' 2\" (1.575 m)   Wt 51.1 kg (112 lb 9.6 oz)   SpO2 96%   BMI 20.59 kg/m²       No intake or output data in the 24 hours ending 04/06/22 1522     Physical Examination:     I had a face to face encounter with this patient and independently examined them on 4/6/2022 as outlined below:          Constitutional:  No acute distress, cooperative, pleasant    ENT:  Oral mucosa moist, oropharynx benign. Resp:  CTA bilaterally. No wheezing/rhonchi/rales. No accessory muscle use   CV:  Regular rhythm, normal rate, no murmurs, gallops, rubs    GI:  Soft, non distended, non tender. normoactive bowel sounds, no hepatosplenomegaly     Musculoskeletal:  No edema, warm, 2+ pulses throughout    Neurologic:  Moves all extremities.   AAOx3, CN II-XII reviewed            Data Review:    Review and/or order of clinical lab test  Review and/or order of tests in the radiology section of CPT      Labs:     Recent Labs     04/06/22  0358 04/05/22  0453   WBC 6.5 7.7   HGB 11.0* 11.8   HCT 33.8* 36.8    175     Recent Labs     04/06/22  0358 04/05/22 0453 04/04/22 0450    139 138   K 3.5 3.4* 3.7    104 105   CO2 31 31 30   BUN 38* 33* 32*   CREA 1.21* 1.24* 1.34*   * 126* 145*   CA 9.1 9.4 8.9     Recent Labs     04/06/22 0358 04/05/22 0453 04/04/22 0450   ALT 14 15 14   * 141* 148*   TBILI 0.9 0.7 0.5   TP 5.9* 6.4 6.2*   ALB 2.6* 2.7* 2.6*   GLOB 3.3 3.7 3.6     No results for input(s): INR, PTP, APTT, INREXT, INREXT in the last 72 hours. No results for input(s): FE, TIBC, PSAT, FERR in the last 72 hours. No results found for: FOL, RBCF   No results for input(s): PH, PCO2, PO2 in the last 72 hours. No results for input(s): CPK, CKNDX, TROIQ in the last 72 hours.     No lab exists for component: CPKMB  Lab Results   Component Value Date/Time    Cholesterol, total 178 02/13/2021 01:22 PM    HDL Cholesterol 48 02/13/2021 01:22 PM    LDL, calculated 91 02/13/2021 01:22 PM    Triglyceride 195 (H) 02/13/2021 01:22 PM    CHOL/HDL Ratio 3.7 02/13/2021 01:22 PM     No results found for: [facility]  Lab Results   Component Value Date/Time    Color YELLOW/STRAW 03/31/2022 07:28 PM    Appearance CLEAR 03/31/2022 07:28 PM    Specific gravity 1.016 03/31/2022 07:28 PM    pH (UA) 5.5 03/31/2022 07:28 PM    Protein 30 (A) 03/31/2022 07:28 PM    Glucose Negative 03/31/2022 07:28 PM    Ketone Negative 03/31/2022 07:28 PM    Bilirubin Negative 03/31/2022 07:28 PM    Urobilinogen 0.2 03/31/2022 07:28 PM    Nitrites Negative 03/31/2022 07:28 PM    Leukocyte Esterase SMALL (A) 03/31/2022 07:28 PM    Epithelial cells FEW 03/31/2022 07:28 PM    Bacteria Negative 03/31/2022 07:28 PM    WBC 20-50 03/31/2022 07:28 PM    RBC 0-5 03/31/2022 07:28 PM         Medications Reviewed:     Current Facility-Administered Medications   Medication Dose Route Frequency    oxyCODONE IR (ROXICODONE) tablet 2.5 mg  2.5 mg Oral Q4H PRN    apixaban (ELIQUIS) tablet 2.5 mg  2.5 mg Oral Q12H    albuterol (PROVENTIL VENTOLIN) nebulizer solution 2.5 mg  2.5 mg Inhalation PRN    ondansetron (ZOFRAN) injection 4 mg  4 mg IntraVENous PRN    sodium chloride (NS) flush 5-40 mL  5-40 mL IntraVENous Q8H    sodium chloride (NS) flush 5-40 mL  5-40 mL IntraVENous PRN    naloxone (NARCAN) injection 0.4 mg  0.4 mg IntraVENous PRN    calcium-vitamin D (OS-GIRISH +D3) 500 mg-200 unit per tablet 1 Tablet  1 Tablet Oral TID WITH MEALS    senna-docusate (PERICOLACE) 8.6-50 mg per tablet 1 Tablet  1 Tablet Oral BID    polyethylene glycol (MIRALAX) packet 17 g  17 g Oral DAILY    bisacodyL (DULCOLAX) suppository 10 mg  10 mg Rectal DAILY PRN    traMADoL (ULTRAM) tablet 50 mg  50 mg Oral Q6H PRN    gabapentin (NEURONTIN) capsule 100 mg  100 mg Oral BID    atorvastatin (LIPITOR) tablet 20 mg  20 mg Oral QHS    aspirin delayed-release tablet 81 mg  81 mg Oral DAILY    mirtazapine (REMERON) tablet 7.5 mg  7.5 mg Oral QHS    furosemide (LASIX) tablet 20 mg  20 mg Oral DAILY     ______________________________________________________________________  EXPECTED LENGTH OF STAY: 4d 7h  ACTUAL LENGTH OF STAY:          6                 Paula Pizarro NP

## 2022-04-06 NOTE — PROGRESS NOTES
Problem: Falls - Risk of  Goal: *Absence of Falls  Description: Document yKrie Hernandez Fall Risk and appropriate interventions in the flowsheet.   Outcome: Progressing Towards Goal  Note: Fall Risk Interventions:       Mentation Interventions: Bed/chair exit alarm    Medication Interventions: Bed/chair exit alarm    Elimination Interventions: Bed/chair exit alarm    History of Falls Interventions: Bed/chair exit alarm         Problem: Patient Education: Go to Patient Education Activity  Goal: Patient/Family Education  Outcome: Progressing Towards Goal     Problem: Patient Education: Go to Patient Education Activity  Goal: Patient/Family Education  Outcome: Progressing Towards Goal

## 2022-04-07 VITALS
BODY MASS INDEX: 20 KG/M2 | HEART RATE: 88 BPM | SYSTOLIC BLOOD PRESSURE: 129 MMHG | RESPIRATION RATE: 16 BRPM | TEMPERATURE: 98.1 F | OXYGEN SATURATION: 96 % | WEIGHT: 108.7 LBS | HEIGHT: 62 IN | DIASTOLIC BLOOD PRESSURE: 68 MMHG

## 2022-04-07 LAB
ALBUMIN SERPL-MCNC: 2.4 G/DL (ref 3.5–5)
ALBUMIN/GLOB SERPL: 0.7 {RATIO} (ref 1.1–2.2)
ALP SERPL-CCNC: 127 U/L (ref 45–117)
ALT SERPL-CCNC: 14 U/L (ref 12–78)
ANION GAP SERPL CALC-SCNC: 4 MMOL/L (ref 5–15)
AST SERPL-CCNC: 20 U/L (ref 15–37)
B PERT DNA SPEC QL NAA+PROBE: NOT DETECTED
BASOPHILS # BLD: 0 K/UL (ref 0–0.1)
BASOPHILS NFR BLD: 0 % (ref 0–1)
BILIRUB SERPL-MCNC: 0.6 MG/DL (ref 0.2–1)
BORDETELLA PARAPERTUSSIS PCR, BORPAR: NOT DETECTED
BUN SERPL-MCNC: 41 MG/DL (ref 6–20)
BUN/CREAT SERPL: 33 (ref 12–20)
C PNEUM DNA SPEC QL NAA+PROBE: NOT DETECTED
CALCIUM SERPL-MCNC: 8.8 MG/DL (ref 8.5–10.1)
CHLORIDE SERPL-SCNC: 102 MMOL/L (ref 97–108)
CO2 SERPL-SCNC: 30 MMOL/L (ref 21–32)
CREAT SERPL-MCNC: 1.25 MG/DL (ref 0.55–1.02)
DIFFERENTIAL METHOD BLD: ABNORMAL
EOSINOPHIL # BLD: 0.1 K/UL (ref 0–0.4)
EOSINOPHIL NFR BLD: 1 % (ref 0–7)
ERYTHROCYTE [DISTWIDTH] IN BLOOD BY AUTOMATED COUNT: 14.4 % (ref 11.5–14.5)
FLUAV H1 2009 PAND RNA SPEC QL NAA+PROBE: NOT DETECTED
FLUAV H1 RNA SPEC QL NAA+PROBE: NOT DETECTED
FLUAV H3 RNA SPEC QL NAA+PROBE: NOT DETECTED
FLUAV SUBTYP SPEC NAA+PROBE: NOT DETECTED
FLUBV RNA SPEC QL NAA+PROBE: NOT DETECTED
GLOBULIN SER CALC-MCNC: 3.4 G/DL (ref 2–4)
GLUCOSE SERPL-MCNC: 106 MG/DL (ref 65–100)
HADV DNA SPEC QL NAA+PROBE: NOT DETECTED
HCOV 229E RNA SPEC QL NAA+PROBE: NOT DETECTED
HCOV HKU1 RNA SPEC QL NAA+PROBE: NOT DETECTED
HCOV NL63 RNA SPEC QL NAA+PROBE: NOT DETECTED
HCOV OC43 RNA SPEC QL NAA+PROBE: NOT DETECTED
HCT VFR BLD AUTO: 33.9 % (ref 35–47)
HGB BLD-MCNC: 10.9 G/DL (ref 11.5–16)
HMPV RNA SPEC QL NAA+PROBE: NOT DETECTED
HPIV1 RNA SPEC QL NAA+PROBE: NOT DETECTED
HPIV2 RNA SPEC QL NAA+PROBE: NOT DETECTED
HPIV3 RNA SPEC QL NAA+PROBE: NOT DETECTED
HPIV4 RNA SPEC QL NAA+PROBE: NOT DETECTED
IMM GRANULOCYTES # BLD AUTO: 0 K/UL (ref 0–0.04)
IMM GRANULOCYTES NFR BLD AUTO: 0 % (ref 0–0.5)
LYMPHOCYTES # BLD: 1.9 K/UL (ref 0.8–3.5)
LYMPHOCYTES NFR BLD: 30 % (ref 12–49)
M PNEUMO DNA SPEC QL NAA+PROBE: NOT DETECTED
MCH RBC QN AUTO: 31.1 PG (ref 26–34)
MCHC RBC AUTO-ENTMCNC: 32.2 G/DL (ref 30–36.5)
MCV RBC AUTO: 96.6 FL (ref 80–99)
MONOCYTES # BLD: 0.7 K/UL (ref 0–1)
MONOCYTES NFR BLD: 11 % (ref 5–13)
NEUTS SEG # BLD: 3.7 K/UL (ref 1.8–8)
NEUTS SEG NFR BLD: 58 % (ref 32–75)
NRBC # BLD: 0 K/UL (ref 0–0.01)
NRBC BLD-RTO: 0 PER 100 WBC
PLATELET # BLD AUTO: 213 K/UL (ref 150–400)
PMV BLD AUTO: 11 FL (ref 8.9–12.9)
POTASSIUM SERPL-SCNC: 3.6 MMOL/L (ref 3.5–5.1)
PROT SERPL-MCNC: 5.8 G/DL (ref 6.4–8.2)
RBC # BLD AUTO: 3.51 M/UL (ref 3.8–5.2)
RSV RNA SPEC QL NAA+PROBE: NOT DETECTED
RV+EV RNA SPEC QL NAA+PROBE: NOT DETECTED
SARS-COV-2 PCR, COVPCR: NOT DETECTED
SODIUM SERPL-SCNC: 136 MMOL/L (ref 136–145)
WBC # BLD AUTO: 6.4 K/UL (ref 3.6–11)

## 2022-04-07 PROCEDURE — 80053 COMPREHEN METABOLIC PANEL: CPT

## 2022-04-07 PROCEDURE — 74011250637 HC RX REV CODE- 250/637: Performed by: NURSE PRACTITIONER

## 2022-04-07 PROCEDURE — 0202U NFCT DS 22 TRGT SARS-COV-2: CPT

## 2022-04-07 PROCEDURE — 36415 COLL VENOUS BLD VENIPUNCTURE: CPT

## 2022-04-07 PROCEDURE — 74011250637 HC RX REV CODE- 250/637: Performed by: STUDENT IN AN ORGANIZED HEALTH CARE EDUCATION/TRAINING PROGRAM

## 2022-04-07 PROCEDURE — 74011000250 HC RX REV CODE- 250: Performed by: STUDENT IN AN ORGANIZED HEALTH CARE EDUCATION/TRAINING PROGRAM

## 2022-04-07 PROCEDURE — 85025 COMPLETE CBC W/AUTO DIFF WBC: CPT

## 2022-04-07 RX ORDER — AMOXICILLIN 250 MG
1 CAPSULE ORAL 2 TIMES DAILY
Qty: 60 TABLET | Refills: 0 | Status: SHIPPED
Start: 2022-04-07 | End: 2022-05-07

## 2022-04-07 RX ORDER — TRAMADOL HYDROCHLORIDE 50 MG/1
50 TABLET ORAL
Qty: 9 TABLET | Refills: 0 | Status: SHIPPED | OUTPATIENT
Start: 2022-04-07 | End: 2022-04-10

## 2022-04-07 RX ORDER — OXYCODONE HYDROCHLORIDE 5 MG/1
2.5 TABLET ORAL
Qty: 5 TABLET | Refills: 0 | Status: SHIPPED
Start: 2022-04-07 | End: 2022-04-10

## 2022-04-07 RX ORDER — POLYETHYLENE GLYCOL 3350 17 G/17G
17 POWDER, FOR SOLUTION ORAL DAILY
Qty: 30 PACKET | Refills: 0 | Status: SHIPPED
Start: 2022-04-07 | End: 2022-05-07

## 2022-04-07 RX ADMIN — OYSTER SHELL CALCIUM WITH VITAMIN D 1 TABLET: 500; 200 TABLET, FILM COATED ORAL at 12:00

## 2022-04-07 RX ADMIN — APIXABAN 2.5 MG: 2.5 TABLET, FILM COATED ORAL at 09:42

## 2022-04-07 RX ADMIN — GABAPENTIN 100 MG: 100 CAPSULE ORAL at 09:42

## 2022-04-07 RX ADMIN — FUROSEMIDE 20 MG: 20 TABLET ORAL at 09:42

## 2022-04-07 RX ADMIN — OYSTER SHELL CALCIUM WITH VITAMIN D 1 TABLET: 500; 200 TABLET, FILM COATED ORAL at 08:00

## 2022-04-07 RX ADMIN — ASPIRIN 81 MG: 81 TABLET, COATED ORAL at 09:42

## 2022-04-07 RX ADMIN — POLYETHYLENE GLYCOL 3350 17 G: 17 POWDER, FOR SOLUTION ORAL at 09:00

## 2022-04-07 RX ADMIN — SENNOSIDES AND DOCUSATE SODIUM 1 TABLET: 50; 8.6 TABLET ORAL at 09:42

## 2022-04-07 RX ADMIN — SODIUM CHLORIDE, PRESERVATIVE FREE 10 ML: 5 INJECTION INTRAVENOUS at 06:48

## 2022-04-07 RX ADMIN — TRAMADOL HYDROCHLORIDE 50 MG: 50 TABLET, COATED ORAL at 06:48

## 2022-04-07 NOTE — PROGRESS NOTES
Per case management, [facility] will pick patient up and transfer to [facility] at [facility]

## 2022-04-07 NOTE — PROGRESS NOTES
Problem: Patient Education: Go to Patient Education Activity  Goal: Patient/Family Education  Outcome: Progressing Towards Goal     Problem: Pressure Injury - Risk of  Goal: *Prevention of pressure injury  Description: Document Adarsh Scale and appropriate interventions in the flowsheet.   Outcome: Progressing Towards Goal  Note: Pressure Injury Interventions:  Sensory Interventions: Assess changes in LOC    Moisture Interventions: Absorbent underpads    Activity Interventions: PT/OT evaluation    Mobility Interventions: PT/OT evaluation    Nutrition Interventions: Offer support with meals,snacks and hydration    Friction and Shear Interventions: HOB 30 degrees or less

## 2022-04-07 NOTE — DISCHARGE INSTRUCTIONS
Post op Discharge Instructions Hip Fracture   Lakisha Sanchez MD  [address]   [phone]    Patient Name: Annie Fletcher  Date of admission:  3/31/2022  Date of procedure: 4/1/2022   Procedure: Procedure(s):  PERCUTANEOUS SCREWS OF LEFT HIP  PCP: Edmund Jensen MD  Date of discharge: No discharge date for patient encounter. Follow up office visit   See Dr. Alex Mijares approximately 2-3 weeks from date of surgery. Call [phone] (ext [phone]) to make an appointment. Activity  Walk with your walker with weight bearing restrictions as instructed by your physical therapist.  Continue using your walker until seen for follow-up visit. You should walk daily with the physical therapist.  Perform your exercise routine 3 times a day as instructed by the physical therapist.    Incision Care  The Aquacel (brown, waterproof) surgical dressing is to remain on the hip for 7 days. On the 7th day gently peel the dressing off by carefully lifting the edge and stretching it slightly to break the adhesive seal.    If your Aquacel dressing comes loose/off before the 7th day, you may replace it with a dry sterile gauze dressing; change it daily. Once the incision is not draining, leave it open to air. You may take a shower with the Aquacel dressing in place. Once the Aquacel is removed,  may shower and get your incision wet but do not submerge your incision under water in a bath tub, hot tub or swimming pool for 6 weeks after surgery. Preventing blood clots  Resume your home Eliquis, Mobilize, Ankle pumps when sitting or in bed      Pain management  - Please take scheduled 650 mg tylenol every 6 hours for 6 weeks  - Please take tramadol every 6 hours for pain as needed for pain. - Avoid alcoholic beverages while taking pain medication  - Do not take any over-the-counter medication for pain except Tylenol (acetaminophen)  - Please be aware that many medications contain Tylenol.   We do not want you to over medicate so please read the information below as a guide. Do not take more than 4 Grams of Tylenol in a 24 hour  - You may place an ice bag on your knee for 15-20 minutes after exercising and as needed throughout the day and night      Diet  Resume usual diet; encourage fluids; provide foods high in fiber, calcium and vitamin D  Provide stool softeners/laxatives as needed      After [facility] for SNF/Rehab (to be followed by post-acute provider)    Nursing  Complete head to toe assessment, vital signs  Medication reconciliation  Review pain management  Manage chronic medical conditions  Remove Aquacel dressing 7 days after surgery    Physical Therapy-status at discharge from the hospital    Weight bearing status:     Left Side Weight Bearing: As tolerated       Mobility Status:  Supine to Sit: Bed Modified,Moderate assistance  Sit to Stand: Moderate assistance  Sit to Supine: Maximum assistance  Bed to Chair: Maximum assistance    Gait:  Distance (ft): 3 Feet (ft) (lateral steps to [address])  Ambulation - Level of Assistance: Minimal assistance  Assistive Device: Gait belt (anterior support )  Gait Abnormalities: Decreased step clearance    ADL status overall composite: Toilet Transfer : Maximum assistance,Additional time (BSC)    Physical Therapy-exercises, transfers, gait-training (partial weight bearing on the surgical leg)    Exercises related to strengthening the surgical hip:  Supine Exercises: Ankle pumps  Quad Sets  Gluteal Sets  Hip Abduction slides supine  Hip external rotation  Heel Slides    Standing Exercises:  Heel Raises  Mini-squats  Heel/toe touches and knee bend  Marching   Hip Abduction  Hip External Rotation  Hip Extension    Advance exercises with equipment for strengthening, flexibility, balance needs as appropriate per setting. Avoid active hip flexion exercised for 4 weeks.      Repetitions and number of sets to be established per patient tolerance     Reasons to call the Surgeon  1. Increased redness, swelling or drainage from the incision site  2. Temperature consistently greater than 101 degrees  3. Increased pain or unrelieved pain in hip or calf                            Sergey Deras MD   Physician   Internal Medicine   Discharge Summary      Signed   Date of Service:  04/07/22 1422                         []Hide copied text    []Samira for details       Discharge Summary         PATIENT ID: Curt Valentin  MRN: [id number]   YOB: 1926    DATE OF ADMISSION: 3/31/2022  4:19 PM    DATE OF DISCHARGE: 04/07/22   PRIMARY CARE PROVIDER: Lin Engel MD      ATTENDING PHYSICIAN: Aisha Soulier, MD  DISCHARGING PROVIDER: Aisha Soulier, MD    To contact this individual call [phone] and ask the  to page. If unavailable ask to be transferred the Adult Hospitalist Department.     CONSULTATIONS: None     PROCEDURES/SURGERIES: Procedure(s):  PERCUTANEOUS SCREWS OF LEFT HIP     ADMITTING DIAGNOSES & HOSPITAL COURSE:   HPI \"80year-old female who sustained a fall at home. Nathan Hwang has a past medical history significant for aphasia, chronic systolic congestive heart failure.  She has a history of atrial fibrillation for which she takes apixaban. Work-up in the emergency department revealed a left nondisplaced femoral fracture.  Patient had reportedly fallen a couple of days prior to presentation and was noted to be dragging her leg when walking. \"     Patient was managed acute left femoral neck fracture nondisplaced s/p percutaneous screw fixation. Orthopedics is following pain well controlled on tramadol enoxaparin. PT OT evaluated patient recommended SNF. D/c to snf today. Patient is on Eliquis already for DVT PPx as well a afib which is chronic for her.   Daughter updated over the phone on plan.               DISCHARGE DIAGNOSES / PLAN:       Left nondisplaced femoral fracture  Postop day 5 percutaneous screw fixation  Orthopedics following, thank you for your assistance  Pain currently controlled  Continuing tramadol  Continuing oxycodone 2.5 mg for breakthrough pain  Separate oxycodone from tramadol by at least 1 hour  Working with physical therapy           Chronic systolic congestive heart failure  Stable, not in exacerbation, lungs clear  Continuing furosemide     Hypertension  Blood pressure currently controlled  Continuing furosemide     Atrial fibrillation  Heart rate currently controlled  Continue apixaban     Code status: Full     DVT prophylaxis:apixaban     Care Plan discussed with: Patient/Family, Nurse and       Anticipated Disposition: SNF/LTC,      Anticipated Discharge: 24 hrs  Patient is medically ready for discharge today            FOLLOW UP APPOINTMENTS:             Follow-up Information      Follow up With Specialties Details Why Contact Info     Betty Degroot MD Family Medicine     [address]  [phone]        [address] [place] [address]  [phone]     Betty Degroot MD Family Medicine In 3 days   100 [facility] [address]         Francesca Tobar DO Orthopedic Surgery In 1 week   [address] [id number]  [phone]                ADDITIONAL CARE RECOMMENDATIONS: rpt cbc and bmp 3-5 day s     DIET: Resume previous diet     ACTIVITY: Activity as tolerated           DISCHARGE MEDICATIONS:       Current Discharge Medication List            START taking these medications     Details   senna-docusate (PERICOLACE) 8.6-50 mg per tablet Take 1 Tablet by mouth two (2) times a day for 30 days. Qty: 60 Tablet, Refills: 0  Start date: 4/7/2022, End date: 5/7/2022       traMADoL (ULTRAM) 50 mg tablet Take 1 Tablet by mouth every eight (8) hours as needed for Pain for up to 3 days.  Max Daily Amount: 150 mg.  Qty: 9 Tablet, Refills: 0  Start date: 4/7/2022, End date: 4/10/2022     Associated Diagnoses: Closed fracture of left hip, initial encounter ([facility]       oxyCODONE IR (ROXICODONE) 5 mg immediate release tablet Take 0.5 Tablets by mouth every eight (8) hours as needed for Pain for up to 3 days. Max Daily Amount: 7.5 mg.  Qty: 5 Tablet, Refills: 0  Start date: 4/7/2022, End date: 4/10/2022     Associated Diagnoses: Closed fracture of left hip, initial encounter ([facility]                CONTINUE these medications which have NOT CHANGED     Details   gabapentin (NEURONTIN) 100 mg capsule Take 1 Cap by mouth two (2) times a day. Max Daily Amount: 200 mg. Qty: 60 Cap, Refills: 0     Associated Diagnoses: History of embolic stroke       atorvastatin (LIPITOR) 20 mg tablet Take 1 Tab by mouth nightly. Qty: 30 Tab, Refills: 0       aspirin delayed-release 81 mg tablet Take 1 Tab by mouth daily. Qty: 30 Tab, Refills: 0       apixaban (ELIQUIS) 2.5 mg tablet Take 1 Tab by mouth two (2) times a day. Qty: 60 Tab, Refills: 0       mirtazapine (REMERON) 7.5 mg tablet Take 1 Tab by mouth nightly. Qty: 30 Tab, Refills: 0       furosemide (LASIX) 20 mg tablet Take 1 Tab by mouth daily. Qty: 30 Tab, Refills: 0                    NOTIFY YOUR PHYSICIAN FOR ANY OF THE FOLLOWING:   Fever over 101 degrees for 24 hours. Chest pain, shortness of breath, fever, chills, nausea, vomiting, diarrhea, change in mentation, falling, weakness, bleeding. Severe pain or pain not relieved by medications.   Or, any other signs or symptoms that you may have questions about.     DISPOSITION:    Home With:    OT   PT   HH   RN      x Long term SNF/Inpatient Rehab     Independent/assisted living     Hospice     Other:         PATIENT CONDITION AT DISCHARGE:      Functional status     Poor    x Deconditioned      Independent       Cognition     Lucid      Forgetful    x Dementia          Code status   x  Full code      DNR       PHYSICAL EXAMINATION AT DISCHARGE:  I had a face to face encounter with this patient and independently examined them on 4/7/2022 as outlined below:                                                   Constitutional:  No acute distress, cooperative, pleasant    ENT:  Oral mucosa moist, oropharynx benign. Resp:  CTA bilaterally. No wheezing/rhonchi/rales. No accessory muscle use   CV:  Regular rhythm, normal rate, no murmurs, gallops, rubs    GI:  Soft, non distended, non tender. normoactive bowel sounds, no hepatosplenomegaly     Musculoskeletal:  No edema, warm, 2+ pulses throughout    Neurologic:  Moves all extremities.  AAOx3, CN II-XII reviewed            CHRONIC MEDICAL DIAGNOSES:             Problem List as of 4/7/2022 Date Reviewed: 4/1/2022           Codes Class Noted - Resolved     Atherosclerotic heart disease of native coronary artery without angina pectoris ICD-10-CM: I25.10  ICD-9-CM: 414.01   4/1/2022 - Present           Benign essential hypertension ICD-10-CM: I10  ICD-9-CM: 401. 1   4/1/2022 - Present           Chronic a-fib (HCC) ICD-10-CM: I48.20  ICD-9-CM: 427.31   4/1/2022 - Present           Hyperlipidemia ICD-10-CM: E78.5  ICD-9-CM: 272.4   4/1/2022 - Present           Stage 3b chronic kidney disease (HCC) ICD-10-CM: N18.32  ICD-9-CM: 199. 3   4/1/2022 - Present           Chronic combined systolic and diastolic heart failure (HCC) ICD-10-CM: I50.42  ICD-9-CM: 428.42   4/1/2022 - Present           Hip fracture (HCC) ICD-10-CM: S72.009A  ICD-9-CM: 820. 8   3/31/2022 - Present           Chronic CHF (HCC) (Chronic) ICD-10-CM: I50.9  ICD-9-CM: 428. 0   2/16/2021 - Present           Aphasia ICD-10-CM: R47.01  ICD-9-CM: 958. 3   2/13/2021 - Present                   Greater than 30 minutes were spent with the patient on counseling and coordination of care     Signed:   Aisha Soulier, MD  4/7/2022  2:23 PM                 Routing History

## 2022-04-07 NOTE — PROGRESS NOTES
JACEY: Plan for discharge to [facility] today. PCR negative 4/7 and chest x-ray done yesterday. [facility] phone [phone] transport time set for 4 PM.    Discharge folder located on hard chart to include ambulance form, discharge papers, Kardex and MAR. RN to call report to #[phone] ext. [phone]    CM faxed discharge papers to [phone]. Chart reviewed. CM called [facility] and spoke with [facility] in admissions, confirmed they can accept patient today. CM requested transport with [facility], they can do 4 PM.    CM called the daughter, Karine Garcia to notify of transport time. Medicare pt has received, reviewed, and signed 2nd IM letter informing them of their right to appeal the discharge. Signed copy has been placed on pt bedside chart.       Ravi Cox, DOMINICW/CRM

## 2022-04-07 NOTE — DISCHARGE SUMMARY
Discharge Summary       PATIENT ID: Catarina Parrish  MRN: [id number]   YOB: 1926    DATE OF ADMISSION: 3/31/2022  4:19 PM    DATE OF DISCHARGE: 04/07/22   PRIMARY CARE PROVIDER: Sherryle Mom, MD     ATTENDING PHYSICIAN: Melisa Sauer MD  DISCHARGING PROVIDER: Melisa Sauer MD    To contact this individual call [phone] and ask the  to page. If unavailable ask to be transferred the Adult Hospitalist Department. CONSULTATIONS: None    PROCEDURES/SURGERIES: Procedure(s):  PERCUTANEOUS SCREWS OF LEFT HIP    ADMITTING DIAGNOSES & HOSPITAL COURSE:   HPI \"80year-old female who sustained a fall at home. She has a past medical history significant for aphasia, chronic systolic congestive heart failure. She has a history of atrial fibrillation for which she takes apixaban. Work-up in the emergency department revealed a left nondisplaced femoral fracture. Patient had reportedly fallen a couple of days prior to presentation and was noted to be dragging her leg when walking. \"    Patient was managed acute left femoral neck fracture nondisplaced s/p percutaneous screw fixation. Orthopedics is following pain well controlled on tramadol oxy prn. PT OT evaluated patient recommended SNF. D/c to snf today. Patient is on Eliquis already for DVT PPx as well a afib which is chronic for her. Daughter updated over the phone on plan.            DISCHARGE DIAGNOSES / PLAN:      Left nondisplaced femoral fracture  Postop day 5 percutaneous screw fixation  Orthopedics following, thank you for your assistance  Pain currently controlled  Continuing tramadol  Continuing oxycodone 2.5 mg for breakthrough pain  Separate oxycodone from tramadol by at least 1 hour  Working with physical therapy           Chronic systolic congestive heart failure  Stable, not in exacerbation, lungs clear  Continuing furosemide     Hypertension  Blood pressure currently controlled  Continuing furosemide     Atrial fibrillation  Heart rate currently controlled  Continue apixaban     Code status: Full     DVT prophylaxis:apixaban     Care Plan discussed with: Patient/Family, Nurse and       Anticipated Disposition: SNF/LTC,      Anticipated Discharge: 24 hrs  Patient is medically ready for discharge today         FOLLOW UP APPOINTMENTS:    Follow-up Information     Follow up With Specialties Details Why Contact Info    Fadia Becerril MD Family Medicine   100 [facility] [address]      300 [facility]    [id number] [place] [address]    Fadia Becerril MD Family Medicine In 3 days  100 [facility]      Preston Haggis, DO Orthopedic Surgery In 1 week  [address] [address]  [phone]             ADDITIONAL CARE RECOMMENDATIONS: rpt cbc and bmp 3-5 day s    DIET: Resume previous diet    ACTIVITY: Activity as tolerated        DISCHARGE MEDICATIONS:  Current Discharge Medication List      START taking these medications    Details   senna-docusate (PERICOLACE) 8.6-50 mg per tablet Take 1 Tablet by mouth two (2) times a day for 30 days. Qty: 60 Tablet, Refills: 0  Start date: 4/7/2022, End date: 5/7/2022      traMADoL (ULTRAM) 50 mg tablet Take 1 Tablet by mouth every eight (8) hours as needed for Pain for up to 3 days. Max Daily Amount: 150 mg.  Qty: 9 Tablet, Refills: 0  Start date: 4/7/2022, End date: 4/10/2022    Associated Diagnoses: Closed fracture of left hip, initial encounter ([facility])      oxyCODONE IR (ROXICODONE) 5 mg immediate release tablet Take 0.5 Tablets by mouth every eight (8) hours as needed for Pain for up to 3 days.  Max Daily Amount: 7.5 mg.  Qty: 5 Tablet, Refills: 0  Start date: 4/7/2022, End date: 4/10/2022    Associated Diagnoses: Closed fracture of left hip, initial encounter ([facility] CONTINUE these medications which have NOT CHANGED    Details   gabapentin (NEURONTIN) 100 mg capsule Take 1 Cap by mouth two (2) times a day. Max Daily Amount: 200 mg. Qty: 60 Cap, Refills: 0    Associated Diagnoses: History of embolic stroke      atorvastatin (LIPITOR) 20 mg tablet Take 1 Tab by mouth nightly. Qty: 30 Tab, Refills: 0      aspirin delayed-release 81 mg tablet Take 1 Tab by mouth daily. Qty: 30 Tab, Refills: 0      apixaban (ELIQUIS) 2.5 mg tablet Take 1 Tab by mouth two (2) times a day. Qty: 60 Tab, Refills: 0      mirtazapine (REMERON) 7.5 mg tablet Take 1 Tab by mouth nightly. Qty: 30 Tab, Refills: 0      furosemide (LASIX) 20 mg tablet Take 1 Tab by mouth daily. Qty: 30 Tab, Refills: 0               NOTIFY YOUR PHYSICIAN FOR ANY OF THE FOLLOWING:   Fever over 101 degrees for 24 hours. Chest pain, shortness of breath, fever, chills, nausea, vomiting, diarrhea, change in mentation, falling, weakness, bleeding. Severe pain or pain not relieved by medications. Or, any other signs or symptoms that you may have questions about. DISPOSITION:    Home With:   OT  PT  HH  RN      x Long term SNF/Inpatient Rehab    Independent/assisted living    Hospice    Other:       PATIENT CONDITION AT DISCHARGE:     Functional status    Poor    x Deconditioned     Independent      Cognition     Lucid     Forgetful    x Dementia        Code status   x  Full code     DNR      PHYSICAL EXAMINATION AT DISCHARGE:  I had a face to face encounter with this patient and independently examined them on 4/7/2022 as outlined below:                                                   Constitutional:  No acute distress, cooperative, pleasant    ENT:  Oral mucosa moist, oropharynx benign. Resp:  CTA bilaterally. No wheezing/rhonchi/rales. No accessory muscle use   CV:  Regular rhythm, normal rate, no murmurs, gallops, rubs    GI:  Soft, non distended, non tender.  normoactive bowel sounds, no hepatosplenomegaly Musculoskeletal:  No edema, warm, 2+ pulses throughout    Neurologic:  Moves all extremities.   AAOx3, CN II-XII reviewed         CHRONIC MEDICAL DIAGNOSES:  Problem List as of 4/7/2022 Date Reviewed: 4/1/2022          Codes Class Noted - Resolved    Atherosclerotic heart disease of native coronary artery without angina pectoris ICD-10-CM: I25.10  ICD-9-CM: 414.01  4/1/2022 - Present        Benign essential hypertension ICD-10-CM: I10  ICD-9-CM: 401.1  4/1/2022 - Present        Chronic a-fib ([facility] ICD-10-CM: I48.20  ICD-9-CM: 427.31  4/1/2022 - Present        Hyperlipidemia ICD-10-CM: E78.5  ICD-9-CM: 272.4  4/1/2022 - Present        Stage 3b chronic kidney disease ([facility] ICD-10-CM: N18.32  ICD-9-CM: 585.3  4/1/2022 - Present        Chronic combined systolic and diastolic heart failure ([facility] ICD-10-CM: I50.42  ICD-9-CM: 428.42  4/1/2022 - Present        Hip fracture ([facility] ICD-10-CM: S72.009A  ICD-9-CM: 820.8  3/31/2022 - Present        Chronic CHF ([facility] (Chronic) ICD-10-CM: I50.9  ICD-9-CM: 428.0  2/16/2021 - Present        Aphasia ICD-10-CM: R47.01  ICD-9-CM: 784.3  2/13/2021 - Present              Greater than 30 minutes were spent with the patient on counseling and coordination of care    Signed:   Mandeep Andrews MD  4/7/2022  2:23 PM

## 2022-04-07 NOTE — PROGRESS NOTES
JACEY: [facility] has accepted patient, pending PCR test. noted there is a Rapid covid test negative 4/6. [facility] requires PCR covid test (this was ordered yesterday and has not been done) and a chest x-ray (chest x-ray done yesterday). CM notified nursing of PCR test needed. CM updated attending. BLS to transport at discharge.     Preeti Delgado, DOMINICW/CRM

## 2022-04-12 NOTE — PROGRESS NOTES
Physician Progress Note      Cody Kemp  CSN #:                  [id number]  :                       1926  ADMIT DATE:       3/31/2022 4:19 PM  [address] [place] [place] DATE:        2022 4:40 PM  RESPONDING  PROVIDER #:        Gustaob Granados MD          QUERY TEXT:    Patient was admitted with a femur fracture. Patient was noted to have  27% loss over last year which is significant for time frame. Patient was seen by the RD on . ASPEN criteria provide were:   Weight Loss: 7.000 - Greater than 20% over 1 year; Body Fat Loss: 7 - Severe body fat loss, Orbital, Buccal region; and Muscle Mass Loss: 7 - Severe muscle mass loss, Clavicles (pectoralis &deltoids). Recommendation included RD will order Ensure Enlive (high calorie, high protein) and Magic Cup 1x/day each. After further review, can the patient's nutritional status be specified as:    ASPEN Criteria:  https://aspenjournals. onlinelibrary. martins. com/doi/full/10.1177/7504370540674572    Thank you,  Louis Gillette  [phone]  Options provided:  -- Severe malnutrition per ASPEN  -- Non-severe malnutrition per ASPEN  -- Other - I will add my own diagnosis  -- Disagree - Not applicable / Not valid  -- Disagree - Clinically unable to determine / Unknown  -- Refer to Clinical Documentation Reviewer    PROVIDER RESPONSE TEXT:    Provider is clinically unable to determine a response to this query.     Query created by: Hilary Marie on 2022 7:29 AM      Electronically signed by:  Gustabo Granados MD 2022 11:22 AM

## 2022-04-28 ENCOUNTER — OFFICE VISIT (OUTPATIENT)
Dept: ORTHOPEDIC SURGERY | Age: 87
End: 2022-04-28
Payer: MEDICARE

## 2022-04-28 VITALS — BODY MASS INDEX: 19.88 KG/M2 | HEIGHT: 62 IN | WEIGHT: 108 LBS

## 2022-04-28 DIAGNOSIS — Z98.890 STATUS POST HIP SURGERY: Primary | ICD-10-CM

## 2022-04-28 PROCEDURE — 99024 POSTOP FOLLOW-UP VISIT: CPT | Performed by: ORTHOPAEDIC SURGERY

## 2022-04-28 NOTE — PROGRESS NOTES
Silvia Aviles (: 1926) is a 80 y.o. female patient, here for evaluation of the following chief complaint(s):  Surgical Follow-up (left hip follow up)       ASSESSMENT/PLAN:  Below is the assessment and plan developed based on review of pertinent history, physical exam, labs, studies, and medications. 27-year-old female comes in today for follow-up. She status post percutaneous screws of the left hip secondary to fracture done on 2022. Postoperatively she is doing well. Pain overall controlled. She is in a wheelchair today secondary to duration from car to office. States she is working with physical therapy and ambulating. Staples removed today, Steri-Strips applied. Incision healing well. Continue with physical therapy. Weight-bear as tolerated. Follow-up in 6 weeks for postop check or sooner as needed. 1. Status post hip surgery  -     XR HIP LT W OR WO PELV 2-3 VWS; Future      Encounter Diagnosis   Name Primary?  Status post hip surgery Yes        No follow-ups on file. SUBJECTIVE/OBJECTIVE:  Silvia Aviles (: 1926) is a 80 y.o. female who presents today for the following:  Chief Complaint   Patient presents with    Surgical Follow-up     left hip follow up       27-year-old female comes in today for follow-up. She is status post percutaneous screws of the left hip secondary to fracture done by Dr. Radha Tompkins. Postoperatively she is doing good. Pain controlled. Ambulating with physical therapy. Remains in rehab. Incision healing well. IMAGING:  XR Results (most recent):  Results from Appointment encounter on 22    XR HIP LT W OR WO PELV 2-3 VWS    Narrative  3 views left hip x-rays ordered personally reviewed. Patient status post pinning of the left hip. Good alignment. No failure cut out noted.        No Known Allergies    Current Outpatient Medications   Medication Sig    senna-docusate (PERICOLACE) 8.6-50 mg per tablet Take 1 Tablet by mouth two (2) times a day for 30 days.  polyethylene glycol (Miralax) 17 gram packet Take 1 Packet by mouth daily for 30 days.  gabapentin (NEURONTIN) 100 mg capsule Take 1 Cap by mouth two (2) times a day. Max Daily Amount: 200 mg.    atorvastatin (LIPITOR) 20 mg tablet Take 1 Tab by mouth nightly.  aspirin delayed-release 81 mg tablet Take 1 Tab by mouth daily.  apixaban (ELIQUIS) 2.5 mg tablet Take 1 Tab by mouth two (2) times a day.  mirtazapine (REMERON) 7.5 mg tablet Take 1 Tab by mouth nightly.  furosemide (LASIX) 20 mg tablet Take 1 Tab by mouth daily. No current facility-administered medications for this visit. No past medical history on file. No past surgical history on file. No family history on file. Social History     Tobacco Use    Smoking status: Not on file    Smokeless tobacco: Not on file   Substance Use Topics    Alcohol use: Not on file        All systems reviewed x 12 and were negative with the exception of None      No flowsheet data found. Vitals:  Ht 5' 2\" (1.575 m)   Wt 108 lb (49 kg)   BMI 19.75 kg/m²    Body mass index is 19.75 kg/m². Physical Exam    General: NAD    Cardiac: Extremities well perfused. Respiratory: Nonlabored breathing. LLE: Incision clean dry and intact with no erythema. Minimal numbness over the LFCN. Normal gait and station. Negative stinchfield. No pain with gentle internal and external rotation. .  Motor strength is grossly intact. Skin warm well perfused. Capillary refill <2 sec. Paresh Qureshi M.D. was available for immediate consultation as the supervising physician. An electronic signature was used to authenticate this note.   -- Sarah Ochoa PA-C

## 2023-01-01 ENCOUNTER — HOME CARE VISIT (OUTPATIENT)
Age: 88
End: 2023-01-01
Payer: MEDICARE

## 2023-11-27 ENCOUNTER — APPOINTMENT (OUTPATIENT)
Facility: HOSPITAL | Age: 88
End: 2023-11-27
Payer: MEDICARE

## 2023-11-27 ENCOUNTER — HOSPITAL ENCOUNTER (EMERGENCY)
Facility: HOSPITAL | Age: 88
Discharge: HOME | End: 2023-11-27
Attending: STUDENT IN AN ORGANIZED HEALTH CARE EDUCATION/TRAINING PROGRAM
Payer: MEDICARE

## 2023-11-27 VITALS
HEART RATE: 70 BPM | DIASTOLIC BLOOD PRESSURE: 80 MMHG | RESPIRATION RATE: 22 BRPM | TEMPERATURE: 98.9 F | OXYGEN SATURATION: 100 % | SYSTOLIC BLOOD PRESSURE: 147 MMHG

## 2023-11-27 DIAGNOSIS — I63.9 CEREBROVASCULAR ACCIDENT (CVA), UNSPECIFIED MECHANISM (HCC): Primary | ICD-10-CM

## 2023-11-27 LAB
ALBUMIN SERPL-MCNC: 3.8 G/DL (ref 3.5–5)
ALBUMIN/GLOB SERPL: 1.1 (ref 1.1–2.2)
ALP SERPL-CCNC: 85 U/L (ref 45–117)
ALT SERPL-CCNC: 17 U/L (ref 12–78)
ANION GAP SERPL CALC-SCNC: 5 MMOL/L (ref 5–15)
AST SERPL-CCNC: 19 U/L (ref 15–37)
BASOPHILS # BLD: 0 K/UL (ref 0–0.1)
BASOPHILS NFR BLD: 0 % (ref 0–1)
BILIRUB SERPL-MCNC: 0.6 MG/DL (ref 0.2–1)
BUN SERPL-MCNC: 19 MG/DL (ref 6–20)
BUN/CREAT SERPL: 12 (ref 12–20)
CALCIUM SERPL-MCNC: 8.9 MG/DL (ref 8.5–10.1)
CHLORIDE SERPL-SCNC: 105 MMOL/L (ref 97–108)
CO2 SERPL-SCNC: 26 MMOL/L (ref 21–32)
CREAT SERPL-MCNC: 1.6 MG/DL (ref 0.55–1.02)
DIFFERENTIAL METHOD BLD: NORMAL
EOSINOPHIL # BLD: 0 K/UL (ref 0–0.4)
EOSINOPHIL NFR BLD: 1 % (ref 0–7)
ERYTHROCYTE [DISTWIDTH] IN BLOOD BY AUTOMATED COUNT: 14.5 % (ref 11.5–14.5)
GLOBULIN SER CALC-MCNC: 3.4 G/DL (ref 2–4)
GLUCOSE SERPL-MCNC: 104 MG/DL (ref 65–100)
HCT VFR BLD AUTO: 39.4 % (ref 35–47)
HGB BLD-MCNC: 12.6 G/DL (ref 11.5–16)
IMM GRANULOCYTES # BLD AUTO: 0 K/UL (ref 0–0.04)
IMM GRANULOCYTES NFR BLD AUTO: 0 % (ref 0–0.5)
LYMPHOCYTES # BLD: 2.1 K/UL (ref 0.8–3.5)
LYMPHOCYTES NFR BLD: 31 % (ref 12–49)
MAGNESIUM SERPL-MCNC: 1.7 MG/DL (ref 1.6–2.4)
MCH RBC QN AUTO: 30.7 PG (ref 26–34)
MCHC RBC AUTO-ENTMCNC: 32 G/DL (ref 30–36.5)
MCV RBC AUTO: 96.1 FL (ref 80–99)
MONOCYTES # BLD: 0.5 K/UL (ref 0–1)
MONOCYTES NFR BLD: 7 % (ref 5–13)
NEUTS SEG # BLD: 4.2 K/UL (ref 1.8–8)
NEUTS SEG NFR BLD: 61 % (ref 32–75)
NRBC # BLD: 0 K/UL (ref 0–0.01)
NRBC BLD-RTO: 0 PER 100 WBC
PLATELET # BLD AUTO: 251 K/UL (ref 150–400)
PMV BLD AUTO: 10.9 FL (ref 8.9–12.9)
POTASSIUM SERPL-SCNC: 3.7 MMOL/L (ref 3.5–5.1)
PROT SERPL-MCNC: 7.2 G/DL (ref 6.4–8.2)
RBC # BLD AUTO: 4.1 M/UL (ref 3.8–5.2)
SODIUM SERPL-SCNC: 136 MMOL/L (ref 136–145)
TROPONIN I SERPL HS-MCNC: 49 NG/L (ref 0–51)
WBC # BLD AUTO: 7 K/UL (ref 3.6–11)

## 2023-11-27 PROCEDURE — 70450 CT HEAD/BRAIN W/O DYE: CPT

## 2023-11-27 PROCEDURE — 72125 CT NECK SPINE W/O DYE: CPT

## 2023-11-27 PROCEDURE — 36415 COLL VENOUS BLD VENIPUNCTURE: CPT

## 2023-11-27 PROCEDURE — 74176 CT ABD & PELVIS W/O CONTRAST: CPT

## 2023-11-27 PROCEDURE — 80053 COMPREHEN METABOLIC PANEL: CPT

## 2023-11-27 PROCEDURE — 85025 COMPLETE CBC W/AUTO DIFF WBC: CPT

## 2023-11-27 PROCEDURE — 83735 ASSAY OF MAGNESIUM: CPT

## 2023-11-27 PROCEDURE — 99284 EMERGENCY DEPT VISIT MOD MDM: CPT

## 2023-11-27 PROCEDURE — 84484 ASSAY OF TROPONIN QUANT: CPT

## 2023-11-27 ASSESSMENT — PAIN - FUNCTIONAL ASSESSMENT: PAIN_FUNCTIONAL_ASSESSMENT: ADULT NONVERBAL PAIN SCALE (NPVS)

## 2023-11-27 NOTE — ED TRIAGE NOTES
Pt arrives with family for concern over intermittent AMS. Family reports pt has had multiple falls over the past week. Family reports pt hitting head on ceramic tiles. Pt resting in bed comfortably at this time, alert and oriented to person. No distress noted, respirations even and unlabored on room air. Visually, the patient is not experiencing pain at this time. Lap belt in place, son at the bedside. Will continue to monitor. Skin assessment complete at this time. Pt has no skin breakdown on sacral area, dryness noted on bilateral feet. allevyn placed on sacrum for prevention of sacral breakdown.

## 2023-11-28 ENCOUNTER — HOSPICE ADMISSION (OUTPATIENT)
Age: 88
End: 2023-11-28
Payer: MEDICARE

## 2023-11-28 NOTE — DISCHARGE INSTRUCTIONS
No family member present to the ED with some gait issues and some falls. Patient was found to have a subacute/acute stroke. We discussed hospital admission but based on her age and risk factors we feel she would do better at home. I contacted Dr. Erin Newberry from palliative care and he will have the hospice team call you tomorrow morning for assistance with resources. Additionally case management consult was placed. If symptoms change or worsen or you are unable to care for at home please return to the ED.

## 2023-11-29 ENCOUNTER — HOME HEALTH/ HOSPICE FACE TO FACE (OUTPATIENT)
Age: 88
End: 2023-11-29

## 2023-11-29 ENCOUNTER — HOME CARE VISIT (OUTPATIENT)
Facility: HOME HEALTH | Age: 88
End: 2023-11-29
Payer: MEDICARE

## 2023-11-29 VITALS
OXYGEN SATURATION: 100 % | HEART RATE: 57 BPM | RESPIRATION RATE: 14 BRPM | HEIGHT: 62 IN | DIASTOLIC BLOOD PRESSURE: 68 MMHG | SYSTOLIC BLOOD PRESSURE: 110 MMHG | BODY MASS INDEX: 19.75 KG/M2

## 2023-11-29 PROCEDURE — 2500000001 HSPC NON INJECTABLE MED

## 2023-11-29 PROCEDURE — 0651 HSPC ROUTINE HOME CARE

## 2023-11-29 PROCEDURE — G0299 HHS/HOSPICE OF RN EA 15 MIN: HCPCS

## 2023-11-29 ASSESSMENT — ENCOUNTER SYMPTOMS
BOWEL INCONTINENCE: 1
CRAMPS: 1
DIARRHEA: 1

## 2023-11-29 NOTE — FACE TO FACE
patient has:    Supplemental O2  [] Yes  [x] NO   [] PORT  [] ICD    [] Turk Catheter  [] PEG Tube    [] Drain   [] Other:     Constitutional: somnolent; thin/frail with sunken facial features, temporal wasting, and prominent bony structures; chronically ill-appearing  Eyes: sclera anicteric; no injection  ENMT: poor dentition; oral mucosa moist  Cardiovascular: irregular rhythm, distal pulses intact; no edema  Respiratory: no increased work of breathing at rest; breath sounds diminished  Gastrointestinal: +bowel sounds; soft; facial grimacing observed with generalized light palpation  Musculoskeletal: muscle wasting  Skin: thin/fragile; scattered bruising and purpuric lesions; generalized pallor  Neurologic: somnolent but opened eyes to name; able to answer some yes/questions; voice soft/weak; able to follow commands and make needs known  Psychiatric: somnolent; mostly non-participatory   Other:          FERN Epps NP

## 2023-11-29 NOTE — HOSPICE
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 Gallardo Daily     Admitting RN: LORENE Melvin CM: Jose Beckett   : Marguerite Mcginnis  :    Bam Needs DNR:  Yes   signed on admission    Home:   [facility]     Direct Observation: Arrived at the home with NP Jose Hill for face to face and admission. Pt is with hired caregiver Maricruz Armijo (long time caregiver of 2.5 years) and two daughters Hermann Roblero and Rachele Dumont (lives out of state) are present. Pt has had recent falls, functional decline and change of mental status as of Thursday last week. She was seen in local ED and was dx with a stroke, family elected no aggressive care. Pt has since not been able to hold a cup, does not initiate attempts to eat and must be fed. She is bedfast with transfer via wheelchair and has had at least one fall post ED visit. She sleeps 21 hours of the day. During her wakeful hours she can be irritable, resistant to some care and has been talking about \"taking a trip, packing a bag\" and talking to loved ones who have passed. She is in bed, PPS 30, no acute distress. Minimally verbal to simple questions, cooperative. Vitals in normal range, though bradycardic and per Archie Pore NP we will hold digoxin for now. Lungs clear, no respiratory distress noted. She is incontinent of bowel and bladder, periods of loose stools chronically. No open skin areas. Other- discussed goals of care, scope of service and what to expect next. [place], so family has some familiarity with hospice.

## 2023-11-30 ENCOUNTER — HOME CARE VISIT (OUTPATIENT)
Facility: HOME HEALTH | Age: 88
End: 2023-11-30
Payer: MEDICARE

## 2023-11-30 PROCEDURE — 2500000001 HSPC NON INJECTABLE MED

## 2023-11-30 PROCEDURE — G0299 HHS/HOSPICE OF RN EA 15 MIN: HCPCS

## 2023-11-30 PROCEDURE — 0651 HSPC ROUTINE HOME CARE

## 2023-12-01 ENCOUNTER — HOME CARE VISIT (OUTPATIENT)
Facility: HOME HEALTH | Age: 88
End: 2023-12-01
Payer: MEDICARE

## 2023-12-01 VITALS
TEMPERATURE: 98.5 F | HEART RATE: 62 BPM | RESPIRATION RATE: 16 BRPM | DIASTOLIC BLOOD PRESSURE: 66 MMHG | SYSTOLIC BLOOD PRESSURE: 120 MMHG

## 2023-12-01 PROCEDURE — 0651 HSPC ROUTINE HOME CARE

## 2023-12-01 PROCEDURE — G0156 HHCP-SVS OF AIDE,EA 15 MIN: HCPCS

## 2023-12-01 NOTE — HOSPICE
Patient lying in bed upon arrival for visit; patient's two daughters and private duty caregiver present. Patient is resting with eyes closed at beginning of visit. Patient responds to voice; opens eyes and answers questions with yes/no answers. Patient has residual cognitive/visual/motor skill losses from strokes. Multiple recent falls due to this. Fall precautions reviewed. Patient requires total care with all ADLs. During medication reconciliation, daughters report they have been administering Tramadol 25 mg to patient for pain. They also report that patient has been taking Remeron 15 mg, not 7.5 mg.  Adri Cadet NP notified and medications adjusted on medication list.  Daughters to also try Levsin for abdominal cramping per NP recommendation. Patient's daughters are inquiring about delivery times for [facility] and DME. Per Tout susana, [facility] is out for delivery now and estimated to be delivered by 2000 tonight. Phone call made to [facility] and  is on the way and should be at the residence in about 45 minutes. Medications ordered for delivery via [employer]: Tramadol, Levsin. Reinforced hospice 24/7 for any concerns or change in patient's condition.

## 2023-12-02 PROCEDURE — 0651 HSPC ROUTINE HOME CARE

## 2023-12-03 ENCOUNTER — HOME CARE VISIT (OUTPATIENT)
Age: 88
End: 2023-12-03
Payer: MEDICARE

## 2023-12-03 PROCEDURE — 0651 HSPC ROUTINE HOME CARE

## 2023-12-04 ENCOUNTER — HOME CARE VISIT (OUTPATIENT)
Facility: HOME HEALTH | Age: 88
End: 2023-12-04
Payer: MEDICARE

## 2023-12-04 PROCEDURE — G0156 HHCP-SVS OF AIDE,EA 15 MIN: HCPCS

## 2023-12-05 ENCOUNTER — HOME CARE VISIT (OUTPATIENT)
Facility: HOME HEALTH | Age: 88
End: 2023-12-05
Payer: MEDICARE

## 2023-12-05 PROCEDURE — G0155 HHCP-SVS OF CSW,EA 15 MIN: HCPCS

## 2023-12-05 NOTE — HOSPICE
Initial hospice contact within 5 days missed. Hospice MD Notified. SW team will continue to attempt to offer support.

## 2023-12-06 ENCOUNTER — HOME CARE VISIT (OUTPATIENT)
Facility: HOME HEALTH | Age: 88
End: 2023-12-06
Payer: MEDICARE

## 2023-12-06 PROCEDURE — G0156 HHCP-SVS OF AIDE,EA 15 MIN: HCPCS

## 2023-12-06 NOTE — HOSPICE
No immediate spiritual care needs on admission nor has the patient or family reached out for spiritual support. Will contact the family to offer support and assess spiritual care needs.

## 2023-12-06 NOTE — HOSPICE
LMSW and DELPHINE Dave made visit for initial assessment. Upon arrival, patient was resting in bed with patient's daughter and paid caregiver present. Patient has two dogs which are well controlled. Patient has paid help 5 days a week, 8 hours a day. LMSW reviewed SW role and hospice services. LMSW provided supportive counseling to daughter and reassurance of caregiver role. Daughter stated that she is coping well. LMSW will continue to offer support and will remain available to address needs that arise.

## 2023-12-07 ENCOUNTER — HOME CARE VISIT (OUTPATIENT)
Age: 88
End: 2023-12-07
Payer: MEDICARE

## 2023-12-08 ENCOUNTER — HOME CARE VISIT (OUTPATIENT)
Age: 88
End: 2023-12-08
Payer: MEDICARE

## 2023-12-08 ENCOUNTER — HOME CARE VISIT (OUTPATIENT)
Facility: HOME HEALTH | Age: 88
End: 2023-12-08
Payer: MEDICARE

## 2023-12-08 PROCEDURE — G0156 HHCP-SVS OF AIDE,EA 15 MIN: HCPCS

## 2023-12-13 ENCOUNTER — HOME CARE VISIT (OUTPATIENT)
Facility: HOME HEALTH | Age: 88
End: 2023-12-13
Payer: MEDICARE

## 2023-12-14 ENCOUNTER — HOME CARE VISIT (OUTPATIENT)
Facility: HOME HEALTH | Age: 88
End: 2023-12-14
Payer: MEDICARE

## 2023-12-14 PROCEDURE — G0156 HHCP-SVS OF AIDE,EA 15 MIN: HCPCS

## 2023-12-16 ENCOUNTER — HOME CARE VISIT (OUTPATIENT)
Age: 88
End: 2023-12-16
Payer: MEDICARE

## 2023-12-16 PROCEDURE — G0299 HHS/HOSPICE OF RN EA 15 MIN: HCPCS

## 2023-12-26 ENCOUNTER — HOME CARE VISIT (OUTPATIENT)
Age: 88
End: 2023-12-26
Payer: MEDICARE

## 2023-12-26 VITALS
OXYGEN SATURATION: 93 % | TEMPERATURE: 98 F | DIASTOLIC BLOOD PRESSURE: 48 MMHG | HEART RATE: 103 BPM | SYSTOLIC BLOOD PRESSURE: 96 MMHG | RESPIRATION RATE: 18 BRPM

## 2023-12-27 ENCOUNTER — HOME CARE VISIT (OUTPATIENT)
Facility: HOME HEALTH | Age: 88
End: 2023-12-27
Payer: MEDICARE

## 2023-12-27 PROCEDURE — G0156 HHCP-SVS OF AIDE,EA 15 MIN: HCPCS

## 2023-12-27 NOTE — HOSPICE
Received patient resting in hospital bed with eyes closed on arrival. Daughter Sorin [place] present today at bedside for visit. Patient rouses with tactile stimuli from daughter. Patient was alert but nonverbal throughout visit today so orientation was unable to be assessed. Nonverbal signs of pain such as facial grimacing and increased heart rate noted on exam. Reinforced teaching with daughter Sorin Franks that patient would benefit from oral Dilaudid being given at least twice daily for end of life comfort. Daughter endorses reluctance to medicate patient as her mother was raised Jehovah's witness , but later denounced this theology when her father passed away. Daughter given the booklet Gone from my Sight and explained signs of transition to watch for. Blood pressure today 96 / 48 and O2 sats 93% on room air. Daughter mehran feeding patient pureed baby food from a pouch. Patient appears to be having indigestion and hyperactive bowel sounds noted on exam. No supplies requested at this time. Refills: morphine and liquid lorazepam ordered for delivery confirmation number [id number]. Patient remains a high fall risk and full side rails are ordered by triage RN at daughters request. Encouraged Sorin Franks to call Hospice 24/7 with any needs or change in patients condition. Sorin Franks voices understanding and agrees.

## 2023-12-28 ENCOUNTER — HOME CARE VISIT (OUTPATIENT)
Facility: HOME HEALTH | Age: 88
End: 2023-12-28
Payer: MEDICARE

## 2023-12-28 PROCEDURE — G0156 HHCP-SVS OF AIDE,EA 15 MIN: HCPCS

## 2023-12-29 ENCOUNTER — HOME CARE VISIT (OUTPATIENT)
Facility: HOME HEALTH | Age: 88
End: 2023-12-29
Payer: MEDICARE

## 2023-12-29 PROCEDURE — G0156 HHCP-SVS OF AIDE,EA 15 MIN: HCPCS

## 2024-01-01 ENCOUNTER — HOME CARE VISIT (OUTPATIENT)
Facility: HOME HEALTH | Age: 89
End: 2024-01-01
Payer: MEDICARE

## 2024-01-01 ENCOUNTER — HOME CARE VISIT (OUTPATIENT)
Age: 89
End: 2024-01-01
Payer: MEDICARE

## 2024-01-01 ENCOUNTER — HOME HEALTH/ HOSPICE FACE TO FACE (OUTPATIENT)
Age: 89
End: 2024-01-01

## 2024-01-01 VITALS
TEMPERATURE: 98.5 F | DIASTOLIC BLOOD PRESSURE: 62 MMHG | RESPIRATION RATE: 18 BRPM | SYSTOLIC BLOOD PRESSURE: 108 MMHG | HEART RATE: 128 BPM

## 2024-01-01 PROCEDURE — G0299 HHS/HOSPICE OF RN EA 15 MIN: HCPCS

## 2024-01-01 PROCEDURE — G0156 HHCP-SVS OF AIDE,EA 15 MIN: HCPCS

## 2024-01-01 RX ADMIN — HYOSCYAMINE SULFATE 0.12 MG: 0.12 LIQUID ORAL at 12:20

## 2024-01-01 ASSESSMENT — ENCOUNTER SYMPTOMS
BOWEL INCONTINENCE: 1
BOWEL INCONTINENCE: 1

## 2024-01-02 NOTE — HOSPICE
made initial visit to meet patient and caregiver. Patient slept throughout visit. Caregiver with talkative and forthright.  offered pastoral listening and conversation.

## 2024-01-02 NOTE — HOSPICE
No immediate spiritual care needs nor has the patient or family reached out for spiritual support.   Will contact the family by 1/2 to offer support and assess spiritual care needs.

## 2024-01-02 NOTE — HOSPICE
Visit made with hospice nurse practitioner Snow Coyle.  Patient lying in bed upon arrival for visit; patient's daughter Laura present.  Patient is resting but awakens to touch.  Patient is nonverbal throughout visit but does moan when touched or repositioned.  Patient's daughter medicated patient with Morphine prior to RN's visit; additional dose given by daughter during visit per NP instruction.  Respirations irregular on room air.  Patient's daughter reports she heard \"gurgling\" with breath sounds earlier today.  .  Patient is no longer eating, drinking or taking pills due to dysphagia.    Incontinence care completed and brief and chux changed.  New orders per Snow Coyle NP: Morphine 10 mg Q4H scheduled and Q1H PRN.  Haldol 1 mg Q4H scheduled.  To order liquid Levsin.  End of life signs/symptoms and comfort measures reviewed with patient's daughter.  Patient added to daily nurse visit list.  Medication ordered for next day delivery: Levsin liquid.  Reinforced hospice 24/7 for any concerns or change in patient's condition.

## 2024-01-03 NOTE — FACE TO FACE
[facility]   Good Help to Those in Need  FACE TO FACE ENCOUNTER  [phone]     Patient Name: Mayra Camargo  YOB: 1926     Date of Face to Face Visit: 1/2/2024     Location of Visit:  [] [facility]           [] [facility]         [] [facility]        [] [facility]           [] [facility] House ([facility])  [x] Home         [] Other:       Principle Hospice diagnosis: CVA  Related Hospice diagnosis: Atrial fibrillation, hypotension, dysphagia, aphasia, debility  Other Hospice diagnosis: Chronic systolic heart failure (LVEF in 2021 30-35%)     Benefit period number: 4  First day of this BP benefit period: 1/28/2024      HOSPICE SUMMARY       Mayra Camargo is a 89 y.o. year old  female who is being evaluated for hospice re-certification. Family reports that she was previously on hospice approximately 15 years ago but they do not recall why. She resides in private home with daughter Laura, and has private caregivers 3 days/week. Since hospice re-admission on 11/29/2023 with primary hospice diagnosis of CVA, she has demonstrated the following signs/symptoms: weakness/debility, multiple falls, anorexia, dysphagia, and signs of pain.      Routine home visit today in conjunction with visiting nurse. Daughter Laura also present and offered interval history. Pt has demonstrated significant physical decline over the last week. She is minimally responsive and is now nonverbal. She is no longer eating and is having increased difficulty tolerating sips of liquids and managing oral secretions. She is bed bound and dependent for all ADL care and bed mobility. Increased tone to all extremities, with facial grimacing and soft moaning with provision of any care. Incontinent of bowel and bladder, and daughter reports decreased urinary output.     Reviewed assessment findings consistent with end of life and advised that goals of care are now focused on comfort and safety. Reviewed symptom management medications with

## 2024-01-06 NOTE — HOSPICE
patients  toes were purple and patient had a purple bedsore Malinda Persaud RNCM notified and present

## 2024-01-07 ENCOUNTER — HOME CARE VISIT (OUTPATIENT)
Age: 89
End: 2024-01-07
Payer: MEDICARE

## 2024-01-14 VITALS
OXYGEN SATURATION: 67 % | RESPIRATION RATE: 18 BRPM | TEMPERATURE: 98.7 F | DIASTOLIC BLOOD PRESSURE: 50 MMHG | SYSTOLIC BLOOD PRESSURE: 90 MMHG | HEART RATE: 89 BPM

## (undated) DEVICE — BASIN ST MAJOR-NO CAUTERY: Brand: MEDLINE INDUSTRIES, INC.

## (undated) DEVICE — SUTURE VCRL SZ 0 L27IN ABSRB UD L36MM CP-1 1/2 CIR REV CUT J267H

## (undated) DEVICE — TOWEL SURG W17XL27IN STD BLU COT NONFENESTRATED PREWASHED

## (undated) DEVICE — BNDG ELAS HK LOOP 6X5YD NS -- MATRIX

## (undated) DEVICE — PENCIL SMK EVAC 10 FT BLADE ELECTRD ROCKER FOR TELSCP

## (undated) DEVICE — 6619 2 PTNT ISO SYS INCISE AREA&LT;(&GT;&&LT;)&GT;P: Brand: STERI-DRAPE™ IOBAN™ 2

## (undated) DEVICE — DRSG GZ OIL EMUL CURAD 3X3 --

## (undated) DEVICE — PAD,ABDOMINAL,5"X9",ST,LF,25/BX: Brand: MEDLINE INDUSTRIES, INC.

## (undated) DEVICE — SUTURE VCRL SZ 2-0 L36IN ABSRB UD L40MM CT 1/2 CIR J957H

## (undated) DEVICE — SOLUTION IRRIG 1000ML 0.9% SOD CHL USP POUR PLAS BTL

## (undated) DEVICE — GOWN,SIRUS,NONRNF,SETINSLV,2XL,18/CS: Brand: MEDLINE

## (undated) DEVICE — DRAPE,U/ SHT,SPLIT,PLAS,STERIL: Brand: MEDLINE

## (undated) DEVICE — PACK,BASIC,SIRUS,V: Brand: MEDLINE

## (undated) DEVICE — Device

## (undated) DEVICE — SPONGE GZ W4XL4IN COT 12 PLY TYP VII WVN C FLD DSGN

## (undated) DEVICE — GLOVE ORTHO 8   MSG9480

## (undated) DEVICE — DERMABOND SKIN ADH 0.7ML -- DERMABOND ADVANCED 12/BX

## (undated) DEVICE — KIT EVAC 0.13IN RECT TB DIA10FR 400CC PVC 3 SPR Y CONN DRN

## (undated) DEVICE — INTENDED FOR TISSUE SEPARATION, AND OTHER PROCEDURES THAT REQUIRE A SHARP SURGICAL BLADE TO PUNCTURE OR CUT.: Brand: BARD-PARKER ® CARBON RIB-BACK BLADES

## (undated) DEVICE — PADDING CST 4INX4YD --

## (undated) DEVICE — BNDG ELAS HK LOOP 4X5YD NS -- MATRIX

## (undated) DEVICE — REM POLYHESIVE ADULT PATIENT RETURN ELECTRODE: Brand: VALLEYLAB

## (undated) DEVICE — GLOVE SURG SZ 8 L12IN FNGR THK94MIL STD WHT LTX FREE